# Patient Record
Sex: MALE | Race: WHITE | NOT HISPANIC OR LATINO | Employment: OTHER | URBAN - METROPOLITAN AREA
[De-identification: names, ages, dates, MRNs, and addresses within clinical notes are randomized per-mention and may not be internally consistent; named-entity substitution may affect disease eponyms.]

---

## 2017-01-04 ENCOUNTER — LAB REQUISITION (OUTPATIENT)
Dept: LAB | Facility: HOSPITAL | Age: 82
End: 2017-01-04

## 2017-01-04 DIAGNOSIS — C43.72 MALIGNANT MELANOMA OF LEFT LOWER EXTREMITY INCLUDING HIP (HCC): ICD-10-CM

## 2017-01-04 PROCEDURE — 88305 TISSUE EXAM BY PATHOLOGIST: CPT | Performed by: SURGERY

## 2017-01-04 PROCEDURE — 88341 IMHCHEM/IMCYTCHM EA ADD ANTB: CPT | Performed by: SURGERY

## 2017-01-10 LAB
CYTO UR: NORMAL
LAB AP CASE REPORT: NORMAL
LAB AP CLINICAL INFORMATION: NORMAL
Lab: NORMAL
PATH REPORT.FINAL DX SPEC: NORMAL
PATH REPORT.GROSS SPEC: NORMAL

## 2017-01-31 ENCOUNTER — HOSPITAL ENCOUNTER (OUTPATIENT)
Dept: GENERAL RADIOLOGY | Facility: HOSPITAL | Age: 82
Discharge: HOME | End: 2017-01-31
Admitting: ORTHOPAEDIC SURGERY

## 2017-01-31 ENCOUNTER — APPOINTMENT (OUTPATIENT)
Dept: PREADMISSION TESTING | Facility: HOSPITAL | Age: 82
End: 2017-01-31

## 2017-01-31 VITALS — WEIGHT: 152.56 LBS | BODY MASS INDEX: 24.52 KG/M2 | HEIGHT: 66 IN

## 2017-01-31 DIAGNOSIS — Z01.89 LABORATORY TEST: Primary | ICD-10-CM

## 2017-01-31 LAB
ABO GROUP BLD: NORMAL
ALBUMIN SERPL-MCNC: 4.1 G/DL (ref 3.2–4.8)
ALBUMIN/GLOB SERPL: 1.8 G/DL (ref 1.5–2.5)
ALP SERPL-CCNC: 80 U/L (ref 25–100)
ALT SERPL W P-5'-P-CCNC: 16 U/L (ref 7–40)
ANION GAP SERPL CALCULATED.3IONS-SCNC: 9 MMOL/L (ref 3–11)
AST SERPL-CCNC: 23 U/L (ref 0–33)
BILIRUB SERPL-MCNC: 0.6 MG/DL (ref 0.3–1.2)
BUN BLD-MCNC: 19 MG/DL (ref 9–23)
BUN/CREAT SERPL: 19 (ref 7–25)
CALCIUM SPEC-SCNC: 9.6 MG/DL (ref 8.7–10.4)
CHLORIDE SERPL-SCNC: 100 MMOL/L (ref 99–109)
CO2 SERPL-SCNC: 24 MMOL/L (ref 20–31)
CREAT BLD-MCNC: 1 MG/DL (ref 0.6–1.3)
DEPRECATED RDW RBC AUTO: 43.9 FL (ref 37–54)
ERYTHROCYTE [DISTWIDTH] IN BLOOD BY AUTOMATED COUNT: 12.9 % (ref 11.3–14.5)
GFR SERPL CREATININE-BSD FRML MDRD: 71 ML/MIN/1.73
GLOBULIN UR ELPH-MCNC: 2.3 GM/DL
GLUCOSE BLD-MCNC: 91 MG/DL (ref 70–100)
HBA1C MFR BLD: 6 % (ref 4.8–5.6)
HCT VFR BLD AUTO: 39.5 % (ref 38.9–50.9)
HGB BLD-MCNC: 13.4 G/DL (ref 13.1–17.5)
MCH RBC QN AUTO: 31.5 PG (ref 27–31)
MCHC RBC AUTO-ENTMCNC: 33.9 G/DL (ref 32–36)
MCV RBC AUTO: 92.9 FL (ref 80–99)
PLATELET # BLD AUTO: 276 10*3/MM3 (ref 150–450)
PMV BLD AUTO: 9.6 FL (ref 6–12)
POTASSIUM BLD-SCNC: 4.6 MMOL/L (ref 3.5–5.5)
PROT SERPL-MCNC: 6.4 G/DL (ref 5.7–8.2)
RBC # BLD AUTO: 4.25 10*6/MM3 (ref 4.2–5.76)
RH BLD: POSITIVE
SODIUM BLD-SCNC: 133 MMOL/L (ref 132–146)
WBC NRBC COR # BLD: 8.91 10*3/MM3 (ref 3.5–10.8)

## 2017-01-31 PROCEDURE — 85027 COMPLETE CBC AUTOMATED: CPT | Performed by: ORTHOPAEDIC SURGERY

## 2017-01-31 PROCEDURE — 86901 BLOOD TYPING SEROLOGIC RH(D): CPT

## 2017-01-31 PROCEDURE — 93010 ELECTROCARDIOGRAM REPORT: CPT | Performed by: INTERNAL MEDICINE

## 2017-01-31 PROCEDURE — 36415 COLL VENOUS BLD VENIPUNCTURE: CPT

## 2017-01-31 PROCEDURE — 93005 ELECTROCARDIOGRAM TRACING: CPT

## 2017-01-31 PROCEDURE — 71020 HC CHEST PA AND LATERAL: CPT

## 2017-01-31 PROCEDURE — 83036 HEMOGLOBIN GLYCOSYLATED A1C: CPT | Performed by: ORTHOPAEDIC SURGERY

## 2017-01-31 PROCEDURE — 80053 COMPREHEN METABOLIC PANEL: CPT | Performed by: ORTHOPAEDIC SURGERY

## 2017-01-31 PROCEDURE — 86900 BLOOD TYPING SEROLOGIC ABO: CPT

## 2017-02-06 ENCOUNTER — HOSPITAL ENCOUNTER (INPATIENT)
Facility: HOSPITAL | Age: 82
LOS: 2 days | Discharge: HOME HEALTH | End: 2017-02-08
Attending: ORTHOPAEDIC SURGERY | Admitting: ORTHOPAEDIC SURGERY

## 2017-02-06 ENCOUNTER — APPOINTMENT (OUTPATIENT)
Dept: GENERAL RADIOLOGY | Facility: HOSPITAL | Age: 82
End: 2017-02-06

## 2017-02-06 ENCOUNTER — ANESTHESIA EVENT (OUTPATIENT)
Dept: PERIOP | Facility: HOSPITAL | Age: 82
End: 2017-02-06

## 2017-02-06 ENCOUNTER — ANESTHESIA (OUTPATIENT)
Dept: PERIOP | Facility: HOSPITAL | Age: 82
End: 2017-02-06

## 2017-02-06 DIAGNOSIS — Z74.09 IMPAIRED MOBILITY AND ADLS: ICD-10-CM

## 2017-02-06 DIAGNOSIS — Z74.09 IMPAIRED FUNCTIONAL MOBILITY, BALANCE, GAIT, AND ENDURANCE: Primary | ICD-10-CM

## 2017-02-06 DIAGNOSIS — Z78.9 IMPAIRED MOBILITY AND ADLS: ICD-10-CM

## 2017-02-06 DIAGNOSIS — Z00.00 HEALTHCARE MAINTENANCE: ICD-10-CM

## 2017-02-06 PROBLEM — M81.0 OSTEOPOROSIS: Status: ACTIVE | Noted: 2017-02-06

## 2017-02-06 PROBLEM — Z96.652 S/P LEFT UNICOMPARTMENTAL KNEE REPLACEMENT: Status: ACTIVE | Noted: 2017-02-06

## 2017-02-06 PROBLEM — M17.12 ARTHRITIS OF KNEE, LEFT: Status: ACTIVE | Noted: 2017-02-06

## 2017-02-06 PROBLEM — I48.91 A-FIB (HCC): Status: ACTIVE | Noted: 2017-02-06

## 2017-02-06 PROBLEM — I10 HTN (HYPERTENSION): Status: ACTIVE | Noted: 2017-02-06

## 2017-02-06 LAB
ABO GROUP BLD: NORMAL
BLD GP AB SCN SERPL QL: NEGATIVE
POTASSIUM BLDA-SCNC: 4.02 MMOL/L (ref 3.5–5.3)
RH BLD: POSITIVE

## 2017-02-06 PROCEDURE — 25010000002 PROPOFOL 10 MG/ML EMULSION: Performed by: NURSE ANESTHETIST, CERTIFIED REGISTERED

## 2017-02-06 PROCEDURE — 0SRD0J9 REPLACEMENT OF LEFT KNEE JOINT WITH SYNTHETIC SUBSTITUTE, CEMENTED, OPEN APPROACH: ICD-10-PCS | Performed by: ORTHOPAEDIC SURGERY

## 2017-02-06 PROCEDURE — 86901 BLOOD TYPING SEROLOGIC RH(D): CPT

## 2017-02-06 PROCEDURE — 84132 ASSAY OF SERUM POTASSIUM: CPT | Performed by: ORTHOPAEDIC SURGERY

## 2017-02-06 PROCEDURE — C1776 JOINT DEVICE (IMPLANTABLE): HCPCS | Performed by: ORTHOPAEDIC SURGERY

## 2017-02-06 PROCEDURE — 25010000002 ROPIVACAINE PER 1 MG: Performed by: NURSE ANESTHETIST, CERTIFIED REGISTERED

## 2017-02-06 PROCEDURE — C1713 ANCHOR/SCREW BN/BN,TIS/BN: HCPCS | Performed by: ORTHOPAEDIC SURGERY

## 2017-02-06 PROCEDURE — 25010000003 CEFAZOLIN IN DEXTROSE 2-4 GM/100ML-% SOLUTION: Performed by: ORTHOPAEDIC SURGERY

## 2017-02-06 PROCEDURE — 86900 BLOOD TYPING SEROLOGIC ABO: CPT

## 2017-02-06 PROCEDURE — 94799 UNLISTED PULMONARY SVC/PX: CPT

## 2017-02-06 PROCEDURE — 86850 RBC ANTIBODY SCREEN: CPT

## 2017-02-06 PROCEDURE — 73560 X-RAY EXAM OF KNEE 1 OR 2: CPT

## 2017-02-06 DEVICE — PRT KN UNIPOL DEPUY 9527034: Type: IMPLANTABLE DEVICE | Site: KNEE | Status: FUNCTIONAL

## 2017-02-06 DEVICE — SIGMA HIGH PERFORMANCE PARTIAL KNEE TIBIAL INSERT UNICONDYLAR ALL POLYETHYLENE SIZE 3 8MM LM/RL XLK
Type: IMPLANTABLE DEVICE | Site: KNEE | Status: FUNCTIONAL
Brand: SIGMA

## 2017-02-06 DEVICE — SIGMA HIGH PERFORMANCE PARTIAL KNEE FEMORAL UNICONDYLAR CEMENTED SIZE 4 LM/RL
Type: IMPLANTABLE DEVICE | Site: KNEE | Status: FUNCTIONAL
Brand: SIGMA

## 2017-02-06 DEVICE — SMARTSET HIGH PERFORMANCE MV MEDIUM VISCOSITY BONE CEMENT 40G
Type: IMPLANTABLE DEVICE | Site: KNEE | Status: FUNCTIONAL
Brand: SMARTSET

## 2017-02-06 RX ORDER — PREGABALIN 75 MG/1
75 CAPSULE ORAL ONCE
Status: COMPLETED | OUTPATIENT
Start: 2017-02-06 | End: 2017-02-06

## 2017-02-06 RX ORDER — HYDRALAZINE HYDROCHLORIDE 20 MG/ML
10 INJECTION INTRAMUSCULAR; INTRAVENOUS EVERY 6 HOURS PRN
Status: DISCONTINUED | OUTPATIENT
Start: 2017-02-06 | End: 2017-02-08 | Stop reason: HOSPADM

## 2017-02-06 RX ORDER — SODIUM CHLORIDE, SODIUM LACTATE, POTASSIUM CHLORIDE, CALCIUM CHLORIDE 600; 310; 30; 20 MG/100ML; MG/100ML; MG/100ML; MG/100ML
9 INJECTION, SOLUTION INTRAVENOUS CONTINUOUS
Status: DISCONTINUED | OUTPATIENT
Start: 2017-02-06 | End: 2017-02-08 | Stop reason: HOSPADM

## 2017-02-06 RX ORDER — ACETAMINOPHEN 160 MG/5ML
650 SOLUTION ORAL EVERY 4 HOURS PRN
Status: DISCONTINUED | OUTPATIENT
Start: 2017-02-06 | End: 2017-02-08 | Stop reason: HOSPADM

## 2017-02-06 RX ORDER — PROMETHAZINE HYDROCHLORIDE 25 MG/ML
12.5 INJECTION, SOLUTION INTRAMUSCULAR; INTRAVENOUS EVERY 6 HOURS PRN
Status: DISCONTINUED | OUTPATIENT
Start: 2017-02-06 | End: 2017-02-08 | Stop reason: HOSPADM

## 2017-02-06 RX ORDER — CEFAZOLIN SODIUM 2 G/100ML
2 INJECTION, SOLUTION INTRAVENOUS EVERY 8 HOURS
Status: COMPLETED | OUTPATIENT
Start: 2017-02-07 | End: 2017-02-07

## 2017-02-06 RX ORDER — ACETAMINOPHEN 325 MG/1
650 TABLET ORAL EVERY 4 HOURS PRN
Status: DISCONTINUED | OUTPATIENT
Start: 2017-02-06 | End: 2017-02-08 | Stop reason: HOSPADM

## 2017-02-06 RX ORDER — ONDANSETRON 2 MG/ML
4 INJECTION INTRAMUSCULAR; INTRAVENOUS EVERY 6 HOURS PRN
Status: DISCONTINUED | OUTPATIENT
Start: 2017-02-06 | End: 2017-02-08 | Stop reason: HOSPADM

## 2017-02-06 RX ORDER — CEFAZOLIN SODIUM 2 G/100ML
2 INJECTION, SOLUTION INTRAVENOUS ONCE
Status: COMPLETED | OUTPATIENT
Start: 2017-02-07 | End: 2017-02-06

## 2017-02-06 RX ORDER — FAMOTIDINE 10 MG/ML
20 INJECTION, SOLUTION INTRAVENOUS ONCE
Status: DISCONTINUED | OUTPATIENT
Start: 2017-02-06 | End: 2017-02-06

## 2017-02-06 RX ORDER — ONDANSETRON 2 MG/ML
4 INJECTION INTRAMUSCULAR; INTRAVENOUS ONCE AS NEEDED
Status: DISCONTINUED | OUTPATIENT
Start: 2017-02-06 | End: 2017-02-06 | Stop reason: HOSPADM

## 2017-02-06 RX ORDER — SENNA AND DOCUSATE SODIUM 50; 8.6 MG/1; MG/1
2 TABLET, FILM COATED ORAL 2 TIMES DAILY
Status: DISCONTINUED | OUTPATIENT
Start: 2017-02-06 | End: 2017-02-08 | Stop reason: HOSPADM

## 2017-02-06 RX ORDER — HYDROCODONE BITARTRATE AND ACETAMINOPHEN 5; 325 MG/1; MG/1
1 TABLET ORAL EVERY 4 HOURS PRN
Status: DISCONTINUED | OUTPATIENT
Start: 2017-02-06 | End: 2017-02-08 | Stop reason: HOSPADM

## 2017-02-06 RX ORDER — SODIUM CHLORIDE 9 MG/ML
150 INJECTION, SOLUTION INTRAVENOUS CONTINUOUS
Status: DISCONTINUED | OUTPATIENT
Start: 2017-02-06 | End: 2017-02-08 | Stop reason: HOSPADM

## 2017-02-06 RX ORDER — FAMOTIDINE 20 MG/1
20 TABLET, FILM COATED ORAL ONCE
Status: COMPLETED | OUTPATIENT
Start: 2017-02-06 | End: 2017-02-06

## 2017-02-06 RX ORDER — SODIUM CHLORIDE 0.9 % (FLUSH) 0.9 %
1-10 SYRINGE (ML) INJECTION AS NEEDED
Status: DISCONTINUED | OUTPATIENT
Start: 2017-02-06 | End: 2017-02-08 | Stop reason: HOSPADM

## 2017-02-06 RX ORDER — SODIUM CHLORIDE 0.9 % (FLUSH) 0.9 %
1-10 SYRINGE (ML) INJECTION AS NEEDED
Status: DISCONTINUED | OUTPATIENT
Start: 2017-02-06 | End: 2017-02-06 | Stop reason: HOSPADM

## 2017-02-06 RX ORDER — AMLODIPINE BESYLATE 5 MG/1
5 TABLET ORAL NIGHTLY
Status: DISCONTINUED | OUTPATIENT
Start: 2017-02-06 | End: 2017-02-08 | Stop reason: HOSPADM

## 2017-02-06 RX ORDER — LIDOCAINE HYDROCHLORIDE 10 MG/ML
1 INJECTION, SOLUTION EPIDURAL; INFILTRATION; INTRACAUDAL; PERINEURAL ONCE
Status: COMPLETED | OUTPATIENT
Start: 2017-02-06 | End: 2017-02-06

## 2017-02-06 RX ORDER — PROPOFOL 10 MG/ML
VIAL (ML) INTRAVENOUS CONTINUOUS PRN
Status: DISCONTINUED | OUTPATIENT
Start: 2017-02-06 | End: 2017-02-06 | Stop reason: SURG

## 2017-02-06 RX ORDER — ROPIVACAINE HYDROCHLORIDE 2 MG/ML
12 INJECTION, SOLUTION EPIDURAL; INFILTRATION CONTINUOUS
Status: DISCONTINUED | OUTPATIENT
Start: 2017-02-06 | End: 2017-02-08

## 2017-02-06 RX ORDER — ASPIRIN 325 MG
325 TABLET, DELAYED RELEASE (ENTERIC COATED) ORAL DAILY
Status: DISCONTINUED | OUTPATIENT
Start: 2017-02-07 | End: 2017-02-08 | Stop reason: HOSPADM

## 2017-02-06 RX ORDER — BUPIVACAINE HYDROCHLORIDE 5 MG/ML
INJECTION, SOLUTION EPIDURAL; INTRACAUDAL AS NEEDED
Status: DISCONTINUED | OUTPATIENT
Start: 2017-02-06 | End: 2017-02-06 | Stop reason: SURG

## 2017-02-06 RX ORDER — TRAMADOL HYDROCHLORIDE 50 MG/1
50 TABLET ORAL EVERY 6 HOURS PRN
Status: DISCONTINUED | OUTPATIENT
Start: 2017-02-06 | End: 2017-02-08 | Stop reason: HOSPADM

## 2017-02-06 RX ORDER — FENTANYL CITRATE 50 UG/ML
50 INJECTION, SOLUTION INTRAMUSCULAR; INTRAVENOUS
Status: DISCONTINUED | OUTPATIENT
Start: 2017-02-06 | End: 2017-02-06 | Stop reason: HOSPADM

## 2017-02-06 RX ORDER — BISACODYL 10 MG
10 SUPPOSITORY, RECTAL RECTAL DAILY PRN
Status: DISCONTINUED | OUTPATIENT
Start: 2017-02-06 | End: 2017-02-08 | Stop reason: HOSPADM

## 2017-02-06 RX ORDER — ACETAMINOPHEN 650 MG
TABLET, EXTENDED RELEASE ORAL AS NEEDED
Status: DISCONTINUED | OUTPATIENT
Start: 2017-02-06 | End: 2017-02-06 | Stop reason: HOSPADM

## 2017-02-06 RX ORDER — CELECOXIB 200 MG/1
200 CAPSULE ORAL ONCE
Status: COMPLETED | OUTPATIENT
Start: 2017-02-07 | End: 2017-02-06

## 2017-02-06 RX ORDER — MAGNESIUM HYDROXIDE 1200 MG/15ML
LIQUID ORAL AS NEEDED
Status: DISCONTINUED | OUTPATIENT
Start: 2017-02-06 | End: 2017-02-06 | Stop reason: HOSPADM

## 2017-02-06 RX ORDER — ACETAMINOPHEN 325 MG/1
650 TABLET ORAL ONCE
Status: COMPLETED | OUTPATIENT
Start: 2017-02-07 | End: 2017-02-06

## 2017-02-06 RX ORDER — DOCUSATE SODIUM 100 MG/1
100 CAPSULE, LIQUID FILLED ORAL 2 TIMES DAILY PRN
Status: DISCONTINUED | OUTPATIENT
Start: 2017-02-06 | End: 2017-02-08 | Stop reason: HOSPADM

## 2017-02-06 RX ORDER — BENAZEPRIL HYDROCHLORIDE 5 MG/1
20 TABLET, FILM COATED ORAL
Status: DISCONTINUED | OUTPATIENT
Start: 2017-02-06 | End: 2017-02-08 | Stop reason: HOSPADM

## 2017-02-06 RX ADMIN — ROPIVACAINE HYDROCHLORIDE 12 ML/HR: 2 INJECTION, SOLUTION EPIDURAL; INFILTRATION at 18:15

## 2017-02-06 RX ADMIN — TRAMADOL HYDROCHLORIDE 50 MG: 50 TABLET, COATED ORAL at 23:26

## 2017-02-06 RX ADMIN — SODIUM CHLORIDE 150 ML/HR: 9 INJECTION, SOLUTION INTRAVENOUS at 20:08

## 2017-02-06 RX ADMIN — BUPIVACAINE HYDROCHLORIDE 10 MG: 5 INJECTION, SOLUTION EPIDURAL; INTRACAUDAL; PERINEURAL at 16:22

## 2017-02-06 RX ADMIN — PROPOFOL 44 MCG/KG/MIN: 10 INJECTION, EMULSION INTRAVENOUS at 16:20

## 2017-02-06 RX ADMIN — CEFAZOLIN SODIUM 2 G: 2 INJECTION, SOLUTION INTRAVENOUS at 23:23

## 2017-02-06 RX ADMIN — CELECOXIB 200 MG: 200 CAPSULE ORAL at 14:45

## 2017-02-06 RX ADMIN — TRANEXAMIC ACID 700 MG: 100 INJECTION, SOLUTION INTRAVENOUS at 17:25

## 2017-02-06 RX ADMIN — BUPIVACAINE HYDROCHLORIDE 50 MG: 5 INJECTION, SOLUTION EPIDURAL; INTRACAUDAL; PERINEURAL at 17:50

## 2017-02-06 RX ADMIN — TRANEXAMIC ACID 700 MG: 100 INJECTION, SOLUTION INTRAVENOUS at 16:30

## 2017-02-06 RX ADMIN — FAMOTIDINE 20 MG: 20 TABLET, FILM COATED ORAL at 14:24

## 2017-02-06 RX ADMIN — MUPIROCIN: 20 OINTMENT TOPICAL at 14:04

## 2017-02-06 RX ADMIN — AMLODIPINE BESYLATE 5 MG: 5 TABLET ORAL at 20:09

## 2017-02-06 RX ADMIN — CEFAZOLIN SODIUM 2 G: 2 INJECTION, SOLUTION INTRAVENOUS at 16:18

## 2017-02-06 RX ADMIN — SODIUM CHLORIDE, POTASSIUM CHLORIDE, SODIUM LACTATE AND CALCIUM CHLORIDE: 600; 310; 30; 20 INJECTION, SOLUTION INTRAVENOUS at 17:05

## 2017-02-06 RX ADMIN — ACETAMINOPHEN 650 MG: 325 TABLET, FILM COATED ORAL at 14:04

## 2017-02-06 RX ADMIN — SODIUM CHLORIDE, POTASSIUM CHLORIDE, SODIUM LACTATE AND CALCIUM CHLORIDE 9 ML/HR: 600; 310; 30; 20 INJECTION, SOLUTION INTRAVENOUS at 14:24

## 2017-02-06 RX ADMIN — LIDOCAINE HYDROCHLORIDE 1 ML: 10 INJECTION, SOLUTION EPIDURAL; INFILTRATION; INTRACAUDAL; PERINEURAL at 14:24

## 2017-02-06 RX ADMIN — PREGABALIN 75 MG: 75 CAPSULE ORAL at 14:03

## 2017-02-06 RX ADMIN — HYDROCODONE BITARTRATE AND ACETAMINOPHEN 1 TABLET: 5; 325 TABLET ORAL at 21:59

## 2017-02-06 RX ADMIN — SODIUM CHLORIDE, POTASSIUM CHLORIDE, SODIUM LACTATE AND CALCIUM CHLORIDE: 600; 310; 30; 20 INJECTION, SOLUTION INTRAVENOUS at 16:15

## 2017-02-06 NOTE — INTERVAL H&P NOTE
Pre-Op H&P (See Recent Office Note Attached)    Review of Systems:  General ROS:  no fever, chills, rashes, No change since last office visit  Cardiovascular ROS: no chest pain or dyspnea on exertion.  + cardiac clearance  Respiratory ROS: no cough, shortness of breath, or wheezing    Immunization History:   Influenza:  Yes 2016  Pneumococcal:  no  Tetanus:  no    Vital Signs:  T97.7 HR 73 O2 95% /75    Physical Exam:    CV:  S1S2 regular rate and rhythm, no murmur               Resp:  Clear to auscultation; respirations regular, even and unlabored    Results Review:    I reviewed the patient's new clinical results.  EKG SR with PAC's in the 60's    Ashanti Jackson, PITO  2/6/2017 2:15 PM

## 2017-02-06 NOTE — ANESTHESIA PREPROCEDURE EVALUATION
Anesthesia Evaluation     Patient summary reviewed and Nursing notes reviewed    Airway   Mallampati: I  TM distance: >3 FB  Neck ROM: full  no difficulty expected  Dental      Pulmonary - negative pulmonary ROS   Cardiovascular   (+) hypertension, dysrhythmias,     ECG reviewed    Neuro/Psych- negative ROS  GI/Hepatic/Renal/Endo - negative ROS     Musculoskeletal     Abdominal    Substance History - negative use     OB/GYN negative ob/gyn ROS         Other   (+) arthritis                          Anesthesia Plan    ASA 2     spinal   (ACB)  intravenous induction   Anesthetic plan and risks discussed with patient.    Plan discussed with CRNA.

## 2017-02-06 NOTE — BRIEF OP NOTE
KNEE ARTHROPLASTY UNICOMPARTMENTAL  Procedure Note    Sandip Feliz  2/6/2017    Pre-op Diagnosis:   Left knee osteoarthritis    Post-op Diagnosis:     Left knee osteoarthritis    Procedure/CPT® Codes:  47274    Procedure(s):  UNICOMPARTMENTAL ARTHROPLASTY LEFT knee    Surgeon(s):  Richard Bennett MD    Anesthesia: General    Staff:   Circulator: Todd Leo RN; Sherri L Haase, RN  Scrub Person: Martinez Valdes; Xu Todd  Vendor Representative: Esteban Turcios  Assistant: Samantha Arriaga PA-C    Estimated Blood Loss: * No values recorded between 2/6/2017 12:00 AM and 2/6/2017  5:38 PM *    Specimens:                * No specimens in log *      Drains:           Findings: isolated medial compartment osteoarthritis    Complications: none      Richard Bennett MD     Date: 2/6/2017  Time: 5:38 PM

## 2017-02-06 NOTE — ANESTHESIA PROCEDURE NOTES
Spinal Block    Patient location during procedure: OR  Start Time: 2/6/2017 4:18 PM  Stop Time: 2/6/2017 4:22 PM  Indication:at surgeon's request  Performed By  CRNA: LIBORIO THAPA  Preanesthetic Checklist  Completed: patient identified, site marked, surgical consent, pre-op evaluation, timeout performed, IV checked, risks and benefits discussed and monitors and equipment checked  Spinal Block Prep:  Patient Position:sitting  Sterile Tech:cap, gloves, sterile barriers and mask  Prep:Chloraprep  Patient Monitoring:blood pressure monitoring, continuous pulse oximetry and EKG  Spinal Block Procedure  Approach:midline  Guidance:landmark technique and palpation technique  Location:L4-L5  Needle Type:Maira (Introducer:   Yes /No)  Needle Gauge:25 G  Placement of Spinal needle event:cerebrospinal fluid  Fluid Appearance:clear  Post Assessment  Patient Tolerance:patient tolerated the procedure well with no apparent complications  Complications no  Additional Notes  Procedure:  Pt assisted to sitting position, with legs in position of comfort over side of bed.  Pt. instructed in optimal spine presentation, the spine was prepped/ Draped and the skin at insertion site was anesthetized with 1% Lidocaine 2 ml.  The spinal needle was then advanced until CSF flow was obtained and LA was injected:      Marcaine 0.5% PSF injected 10 Mg.

## 2017-02-06 NOTE — ANESTHESIA POSTPROCEDURE EVALUATION
Patient: Sandip Feliz    Procedure Summary     Date Anesthesia Start Anesthesia Stop Room / Location    02/06/17 1070 [id number] [facility] / [facility]       Procedure Diagnosis Surgeon Provider    UNICOMPARTMENTAL ARTHROPLASTY LEFT knee (Left Knee) No diagnosis on file. MD Huang Acosta MD          Anesthesia Type: spinal  Last vitals  /64 (02/06/17 1746)    Temp 98.2 °F (36.8 °C) (02/06/17 1746)    Pulse 66 (02/06/17 1746)   Resp 16 (02/06/17 1746)    SpO2 98 % (02/06/17 1746)      Post Anesthesia Care and Evaluation    Patient location during evaluation: PACU  Patient participation: complete - patient participated  Level of consciousness: awake and alert  Pain score: 0  Pain management: adequate  Airway patency: patent  Anesthetic complications: No anesthetic complications  PONV Status: none  Cardiovascular status: hemodynamically stable and acceptable  Respiratory status: nonlabored ventilation, acceptable and nasal cannula  Hydration status: acceptable

## 2017-02-06 NOTE — ANESTHESIA PROCEDURE NOTES
Peripheral Block    Patient location during procedure: post-op  Start time: 2/6/2017 5:48 PM  Stop time: 2/6/2017 5:55 PM  Reason for block: at surgeon's request and post-op pain management  Performed by  Anesthesiologist: STEFAN BARRAGAN  Preanesthetic Checklist  Completed: patient identified, site marked, surgical consent, pre-op evaluation, timeout performed, IV checked, risks and benefits discussed and monitors and equipment checked  Peripheral Block Prep:  Sterile barriers:cap, gloves, mask and sterile barriers  Prep: ChloraPrep  Patient monitoring: blood pressure monitoring, continuous pulse oximetry and EKG  Peripheral Procedure  Guidance:ultrasound guided  Images:still images obtained  Laterality:leftBlock Type:adductor canal block  Injection Technique:catheterNeedle Type:Tuohy  Needle Gauge:18 G  Catheter Size:20 G (20g)  Medications  Local Injected:bupivacaine 0.25% without epinephrine Local Amount Injected:20 (ml)mL  Post Assessment  Patient Tolerance:comfortable throughout block  Complications:no  Additional Notes  Procedure:           CATHETER at skin: 6                                 Analgesia was achieved with 1% Lidocaine 2 ml for infiltration of skin       The pt was placed in the Supine position.  The Insertion site was  prepped and Draped in sterile fashion.  A BBraun 4 inch 18g echogenic needle was then  inserted approximately midline, mid-thigh and advanced In-plane with Ultrasound guidance.  Normal Saline PSF was utilized for hydrodissection of tissue.  The Vastus medialis and Sartorius muscle where visualized and the needle tip was placed in the adductor canal,  lateral to the femoral artery.  LA injection spread was visualized, injection was incremental 1-5ml, injection pressure was normal or little, no intraneural injection, no vascular injection.  LA dose was injected thru the needle(see dose above).  A BBraun 20g wire stylet catheter was placed via the needle with ultrasound  visualization and confirmation with NS fluid bolus. The labeled Catheter was then secured to skin at insertion site with skin afix and steristrips to curled catheter and CHG transparent dressing.  Thank you.

## 2017-02-06 NOTE — H&P
Patient Name: Sandip Feliz  MRN: [id number]  : 1931  DOS: 2017    Attending: Satinder Avalos MD    Primary Care Provider: Kelly Robison MD      Chief complaint:  Left knee pain    Subjective   Patient is a 85 y.o. male presented for scheduled surgery with Dr. Bennett. He plans left knee surgery. His knee has been painful for over a year. He has tried injections with minimal pain relief.     When seen in pre-op he is doing well. He denies pain, nausea, shortness of breath or chest pain. No hx of DVT or PE.    He does have a history of afib and is followed biannually by Dr. Hutchins.    Seen in his room after surgery. Tolerated procedure well, no c/o pain , fever, chills, sob, nausea. His LEs are still with effects of spinal anesthesia. wy      Allergies:  No Known Allergies    Meds:  Prescriptions Prior to Admission   Medication Sig Dispense Refill Last Dose   • amLODIPine-benazepril (LOTREL 5-20) 5-20 MG per capsule Take 1 capsule by mouth Daily.   2017 at 0800   • triamterene-hydrochlorothiazide (MAXZIDE-25) 37.5-25 MG per tablet Take 1 tablet by mouth Daily.   2017 at 0800   • denosumab (PROLIA) 60 MG/ML solution syringe Inject 60 mg under the skin every 6 (six) months.   More than a month   • XARELTO 20 MG tablet Take 0.5 tablets by mouth daily with dinner. 30 tablet 3 2017       History:   Past Medical History   Diagnosis Date   • A-fib, Paroxysmal    • Arthritis    • Hearing impairment    • Hypertension    • Melanoma    • Wears dentures    • Wears glasses    • Wears hearing aid      Past Surgical History   Procedure Laterality Date   • Stomach surgery     • Appendectomy     • Cataract extraction     • Skin cancer excision       MELANOMA     History reviewed. No pertinent family history.  Social History   Substance Use Topics   • Smoking status: Former Smoker     Types: Pipe     Quit date:    • Smokeless tobacco: Never Used   • Alcohol use 1.2 - 2.4 oz/week     1 - 2 Glasses  of wine, 1 - 2 Cans of beer per week      Comment: NIGHTLY   , lives with wife.\    Review of Systems  Pertinent items are noted in HPI, all other systems reviewed and negative    Vital Signs  Visit Vitals   • /69 (Patient Position: Lying)   • Pulse (!) 48   • Temp 98 °F (36.7 °C) (Temporal Artery )   • Resp 16   • SpO2 98%       Physical Exam:    General Appearance:    Alert, cooperative, in no acute distress. [place]   Head:    Normocephalic, without obvious abnormality, atraumatic   Eyes:            Lids and lashes normal, conjunctivae and sclerae normal, no   icterus, no pallor, corneas clear    Ears:    Ears appear intact with no abnormalities noted   Throat:   No oral lesions, no thrush, oral mucosa moist   Neck:   No adenopathy, supple, trachea midline, no thyromegaly    Lungs:     Clear to auscultation,respirations regular, even and                   unlabored    Heart:    Regular rhythm and normal rate, normal S1 and S2   Abdomen:     Normal bowel sounds, no masses, no organomegaly, soft        non-tender, non-distended, no guarding, no rebound                 tenderness   Genitalia:    Deferred   Extremities:   Moves all extremities well, no edema, no cyanosis, no              Redness. Left knee with CDI ACE. Left leg with ACB cath.   Pulses:   Pulses palpable and equal bilaterally   Skin:   No bleeding, bruising or rash   Neurologic:   Cranial nerves 2 - 12 grossly intact, sensation intact      I reviewed the patient's new clinical results.         Results from last 7 days  Lab Units 01/31/17  1111   WBC 10*3/mm3 8.91   HEMOGLOBIN g/dL 13.4   HEMATOCRIT % 39.5   PLATELETS 10*3/mm3 276       Results from last 7 days  Lab Units 01/31/17  1111   SODIUM mmol/L 133   POTASSIUM mmol/L 4.6   CHLORIDE mmol/L 100   TOTAL CO2 mmol/L 24.0   BUN mg/dL 19   CREATININE mg/dL 1.00   CALCIUM mg/dL 9.6   BILIRUBIN mg/dL 0.6   ALK PHOS U/L 80   ALT (SGPT) U/L 16   AST (SGOT) U/L 23   GLUCOSE mg/dL 91     Lab  Results   Component Value Date    HGBA1C 6.00 (H) 01/31/2017       Assessment and Plan:   Principal Problem:    S/P left unicompartmental knee replacement  Active Problems:    A-fib, paroxysmal.    HTN (hypertension)    Osteoporosis    Arthritis of knee, left    Hearing deficit    Hgb A1C elevation, prediabetes range.    Plan  1. PT/OT- early ambulation post op  2. Pain control-prns   3. IS-encourage  4. DVT proph- Mechs/ASA . Restart xarelto when ok with Dr. Bennett  5. Bowel regimen  6. Resume home medications as appropriate  7. Monitor post-op labs. Blood glucose.   8. DC planning for home      Satinder Avalos MD  02/06/17  7:21 PM

## 2017-02-07 PROBLEM — I48.91 A-FIB (HCC): Status: RESOLVED | Noted: 2017-02-06 | Resolved: 2017-02-07

## 2017-02-07 PROBLEM — D62 ACUTE BLOOD LOSS ANEMIA: Status: ACTIVE | Noted: 2017-02-07

## 2017-02-07 PROBLEM — I48.0 PAROXYSMAL ATRIAL FIBRILLATION (HCC): Status: ACTIVE | Noted: 2017-02-07

## 2017-02-07 LAB
ANION GAP SERPL CALCULATED.3IONS-SCNC: 6 MMOL/L (ref 3–11)
BASOPHILS # BLD AUTO: 0.04 10*3/MM3 (ref 0–0.2)
BASOPHILS NFR BLD AUTO: 0.4 % (ref 0–1)
BUN BLD-MCNC: 15 MG/DL (ref 9–23)
BUN/CREAT SERPL: 18.8 (ref 7–25)
CALCIUM SPEC-SCNC: 9 MG/DL (ref 8.7–10.4)
CHLORIDE SERPL-SCNC: 105 MMOL/L (ref 99–109)
CO2 SERPL-SCNC: 24 MMOL/L (ref 20–31)
CREAT BLD-MCNC: 0.8 MG/DL (ref 0.6–1.3)
DEPRECATED RDW RBC AUTO: 43.5 FL (ref 37–54)
EOSINOPHIL # BLD AUTO: 0.31 10*3/MM3 (ref 0.1–0.3)
EOSINOPHIL NFR BLD AUTO: 3.1 % (ref 0–3)
ERYTHROCYTE [DISTWIDTH] IN BLOOD BY AUTOMATED COUNT: 12.6 % (ref 11.3–14.5)
GFR SERPL CREATININE-BSD FRML MDRD: 92 ML/MIN/1.73
GLUCOSE BLD-MCNC: 130 MG/DL (ref 70–100)
GLUCOSE BLDC GLUCOMTR-MCNC: 137 MG/DL (ref 70–130)
GLUCOSE BLDC GLUCOMTR-MCNC: 156 MG/DL (ref 70–130)
HCT VFR BLD AUTO: 34.1 % (ref 38.9–50.9)
HGB BLD-MCNC: 11.6 G/DL (ref 13.1–17.5)
IMM GRANULOCYTES # BLD: 0.02 10*3/MM3 (ref 0–0.03)
IMM GRANULOCYTES NFR BLD: 0.2 % (ref 0–0.6)
LYMPHOCYTES # BLD AUTO: 0.94 10*3/MM3 (ref 0.6–4.8)
LYMPHOCYTES NFR BLD AUTO: 9.4 % (ref 24–44)
MCH RBC QN AUTO: 31.8 PG (ref 27–31)
MCHC RBC AUTO-ENTMCNC: 34 G/DL (ref 32–36)
MCV RBC AUTO: 93.4 FL (ref 80–99)
MONOCYTES # BLD AUTO: 1.15 10*3/MM3 (ref 0–1)
MONOCYTES NFR BLD AUTO: 11.5 % (ref 0–12)
NEUTROPHILS # BLD AUTO: 7.51 10*3/MM3 (ref 1.5–8.3)
NEUTROPHILS NFR BLD AUTO: 75.4 % (ref 41–71)
PLATELET # BLD AUTO: 244 10*3/MM3 (ref 150–450)
PMV BLD AUTO: 9.7 FL (ref 6–12)
POTASSIUM BLD-SCNC: 4 MMOL/L (ref 3.5–5.5)
RBC # BLD AUTO: 3.65 10*6/MM3 (ref 4.2–5.76)
SODIUM BLD-SCNC: 135 MMOL/L (ref 132–146)
WBC NRBC COR # BLD: 9.97 10*3/MM3 (ref 3.5–10.8)

## 2017-02-07 PROCEDURE — 25010000003 CEFAZOLIN IN DEXTROSE 2-4 GM/100ML-% SOLUTION: Performed by: ORTHOPAEDIC SURGERY

## 2017-02-07 PROCEDURE — 85025 COMPLETE CBC W/AUTO DIFF WBC: CPT | Performed by: ORTHOPAEDIC SURGERY

## 2017-02-07 PROCEDURE — 97162 PT EVAL MOD COMPLEX 30 MIN: CPT

## 2017-02-07 PROCEDURE — 94799 UNLISTED PULMONARY SVC/PX: CPT

## 2017-02-07 PROCEDURE — 82962 GLUCOSE BLOOD TEST: CPT

## 2017-02-07 PROCEDURE — 97530 THERAPEUTIC ACTIVITIES: CPT

## 2017-02-07 PROCEDURE — 97166 OT EVAL MOD COMPLEX 45 MIN: CPT

## 2017-02-07 PROCEDURE — 97110 THERAPEUTIC EXERCISES: CPT

## 2017-02-07 PROCEDURE — 25010000002 ONDANSETRON PER 1 MG: Performed by: NURSE PRACTITIONER

## 2017-02-07 PROCEDURE — 63710000001 INSULIN LISPRO (HUMAN) PER 5 UNITS: Performed by: INTERNAL MEDICINE

## 2017-02-07 PROCEDURE — 25010000002 HYDROMORPHONE PER 4 MG: Performed by: ORTHOPAEDIC SURGERY

## 2017-02-07 PROCEDURE — 80048 BASIC METABOLIC PNL TOTAL CA: CPT | Performed by: NURSE PRACTITIONER

## 2017-02-07 PROCEDURE — 25010000002 ROPIVACAINE PER 1 MG: Performed by: NURSE ANESTHETIST, CERTIFIED REGISTERED

## 2017-02-07 PROCEDURE — 97116 GAIT TRAINING THERAPY: CPT

## 2017-02-07 RX ORDER — NICOTINE POLACRILEX 4 MG
15 LOZENGE BUCCAL
Status: DISCONTINUED | OUTPATIENT
Start: 2017-02-07 | End: 2017-02-08 | Stop reason: HOSPADM

## 2017-02-07 RX ORDER — DEXTROSE MONOHYDRATE 25 G/50ML
25 INJECTION, SOLUTION INTRAVENOUS
Status: DISCONTINUED | OUTPATIENT
Start: 2017-02-07 | End: 2017-02-08 | Stop reason: HOSPADM

## 2017-02-07 RX ADMIN — INSULIN LISPRO 2 UNITS: 100 INJECTION, SOLUTION INTRAVENOUS; SUBCUTANEOUS at 21:14

## 2017-02-07 RX ADMIN — ONDANSETRON 4 MG: 2 INJECTION INTRAMUSCULAR; INTRAVENOUS at 20:05

## 2017-02-07 RX ADMIN — HYDROMORPHONE HYDROCHLORIDE 1 MG: 1 INJECTION, SOLUTION INTRAMUSCULAR; INTRAVENOUS; SUBCUTANEOUS at 00:54

## 2017-02-07 RX ADMIN — SODIUM CHLORIDE 150 ML/HR: 9 INJECTION, SOLUTION INTRAVENOUS at 02:01

## 2017-02-07 RX ADMIN — BENAZEPRIL HYDROCHLORIDE 20 MG: 5 TABLET, COATED ORAL at 09:16

## 2017-02-07 RX ADMIN — SODIUM CHLORIDE 150 ML/HR: 9 INJECTION, SOLUTION INTRAVENOUS at 09:17

## 2017-02-07 RX ADMIN — HYDROCODONE BITARTRATE AND ACETAMINOPHEN 1 TABLET: 5; 325 TABLET ORAL at 20:13

## 2017-02-07 RX ADMIN — AMLODIPINE BESYLATE 5 MG: 5 TABLET ORAL at 20:13

## 2017-02-07 RX ADMIN — TRAMADOL HYDROCHLORIDE 50 MG: 50 TABLET, COATED ORAL at 09:17

## 2017-02-07 RX ADMIN — ROPIVACAINE HYDROCHLORIDE 12 ML/HR: 2 INJECTION, SOLUTION EPIDURAL; INFILTRATION at 20:05

## 2017-02-07 RX ADMIN — CEFAZOLIN SODIUM 2 G: 2 INJECTION, SOLUTION INTRAVENOUS at 09:15

## 2017-02-07 RX ADMIN — ASPIRIN 325 MG: 325 TABLET, DELAYED RELEASE ORAL at 09:16

## 2017-02-07 RX ADMIN — ROPIVACAINE HYDROCHLORIDE 12 ML/HR: 2 INJECTION, SOLUTION EPIDURAL; INFILTRATION at 02:00

## 2017-02-07 RX ADMIN — HYDROCODONE BITARTRATE AND ACETAMINOPHEN 1 TABLET: 5; 325 TABLET ORAL at 12:51

## 2017-02-07 RX ADMIN — DOCUSATE SODIUM AND SENNOSIDES 2 TABLET: 8.6; 5 TABLET, FILM COATED ORAL at 09:17

## 2017-02-07 RX ADMIN — HYDROCODONE BITARTRATE AND ACETAMINOPHEN 1 TABLET: 5; 325 TABLET ORAL at 06:06

## 2017-02-07 RX ADMIN — ONDANSETRON 4 MG: 2 INJECTION INTRAMUSCULAR; INTRAVENOUS at 10:59

## 2017-02-07 NOTE — PROGRESS NOTES
"Snadip Feliz  [id number]  8/24/1931    Visit Vitals   • /72   • Pulse 69   • Temp 98.9 °F (37.2 °C) (Tympanic)   • Resp 18   • Ht 66\" (167.6 cm)   • Wt 152 lb (68.9 kg)   • SpO2 95%   • BMI 24.53 kg/m2       Lab Results (last 24 hours)     Procedure Component Value Units Date/Time    OR Potassium [[id number]]  (Normal) Collected:  02/06/17 1357    Specimen:  Blood Updated:  02/06/17 1417     Potassium, OR 4.02 mmol/L     Basic Metabolic Panel [[id number]]  (Abnormal) Collected:  02/07/17 0538    Specimen:  Blood Updated:  02/07/17 0630     Glucose 130 (H) mg/dL      BUN 15 mg/dL      Creatinine 0.80 mg/dL      Sodium 135 mmol/L      Potassium 4.0 mmol/L      Chloride 105 mmol/L      CO2 24.0 mmol/L      Calcium 9.0 mg/dL      eGFR Non African Amer 92 mL/min/1.73      BUN/Creatinine Ratio 18.8      Anion Gap 6.0 mmol/L     Narrative:       National [employer] Guidelines    Stage                           Description                             GFR                      1                               Normal or High                          90+  2                               Mild decrease                            60-89  3                               Moderate decrease                   30-59  4                               Severe decrease                       15-29  5                               Kidney failure                             <15    CBC & Differential [[id number]] Collected:  02/07/17 0538    Specimen:  Blood Updated:  02/07/17 0646    Narrative:       The following orders were created for panel order CBC & Differential.  Procedure                               Abnormality         Status                     ---------                               -----------         ------                     CBC Auto Differential[[id number]]         Abnormal            Final result                 Please view results for these tests on the individual orders.    CBC Auto Differential [[id number]]  " (Abnormal) Collected:  02/07/17 0538    Specimen:  Blood Updated:  02/07/17 0646     WBC 9.97 10*3/mm3      RBC 3.65 (L) 10*6/mm3      Hemoglobin 11.6 (L) g/dL      Hematocrit 34.1 (L) %      MCV 93.4 fL      MCH 31.8 (H) pg      MCHC 34.0 g/dL      RDW 12.6 %      RDW-SD 43.5 fl      MPV 9.7 fL      Platelets 244 10*3/mm3      Neutrophil % 75.4 (H) %      Lymphocyte % 9.4 (L) %      Monocyte % 11.5 %      Eosinophil % 3.1 (H) %      Basophil % 0.4 %      Immature Grans % 0.2 %      Neutrophils, Absolute 7.51 10*3/mm3      Lymphocytes, Absolute 0.94 10*3/mm3      Monocytes, Absolute 1.15 (H) 10*3/mm3      Eosinophils, Absolute 0.31 (H) 10*3/mm3      Basophils, Absolute 0.04 10*3/mm3      Immature Grans, Absolute 0.02 10*3/mm3           Patient Care Team:  Kelly Robison MD as PCP - General (Family Medicine)    SUBJECTIVE  Pain well controlled.  Good start in physical therapy.    PHYSICAL EXAM  Incision clean and dry  Minimal thigh and leg swelling  Neurovascularly intact to knee and toes    Principal Problem:    S/P left unicompartmental knee replacement  Active Problems:    A-fib    HTN (hypertension)    Osteoporosis    Arthritis of knee, left      PLAN / DISPOSITION:  Hemoglobin stable no transfusion anticipated  Continue regional block  Discharge home today/tomorrow depending on physical therapy and pain control    Richard Bennett MD  02/07/17  8:17 AM

## 2017-02-07 NOTE — PLAN OF CARE
Problem: Patient Care Overview (Adult)  Goal: Plan of Care Review  Outcome: Ongoing (interventions implemented as appropriate)    02/07/17 1135   Coping/Psychosocial Response Interventions   Plan Of Care Reviewed With patient   Patient Care Overview   Progress progress toward functional goals as expected   Outcome Evaluation   Outcome Summary/Follow up Plan SWETHA anthony completed this date. Pt demo bed mobility, transfers, and mobility to bathroom with CGA. Issued AE for LB ADL and initiated education on use. Recommend home with assist and home health therapy upon D/C.          Problem: Knee Replacement, Total (Adult)  Goal: Signs and Symptoms of Listed Potential Problems Will be Absent or Manageable (Knee Replacement, Total)  Outcome: Ongoing (interventions implemented as appropriate)    02/07/17 1135   Knee Replacement, Total   Problems Assessed (Total Knee Replacement) functional decline/self care deficit   Problems Present (Total Knee Replacement) functional decline/self care deficit         Problem: Inpatient Occupational Therapy  Goal: Bed Mobility Goal LTG- OT  Outcome: Ongoing (interventions implemented as appropriate)    02/07/17 1135   Bed Mobility OT LTG   Bed Mobility OT LTG, Date Established 02/07/17   Bed Mobility OT LTG, Time to Achieve by discharge   Bed Mobility OT LTG, Activity Type supine to sit/sit to supine   Bed Mobility OT LTG, [place] Level supervision required;verbal cues required   Bed Mobility OT LTG, Assist Device leg        Goal: Transfer Training Goal 1 LTG- OT  Outcome: Ongoing (interventions implemented as appropriate)    02/07/17 1135   Transfer Training OT LTG   Transfer Training OT LTG, Date Established 02/07/17   Transfer Training OT LTG, Time to Achieve by discharge   Transfer Training OT LTG, Activity Type sit to stand/stand to sit;tub   Transfer Training OT LTG, [place] Level contact guard assist;verbal cues required   Transfer Training OT LTG, Assist Device walker,  rolling;shower chair   Transfer Training OT LTG, Additional Goal completed sit to stand with CGA this date   Transfer Training OT LTG, Outcome goal partially met       Goal: Patient Education Goal LTG- OT  Outcome: Ongoing (interventions implemented as appropriate)    02/07/17 1135   Patient Education OT LTG   Patient Education OT LTG, Date Established 02/07/17   Patient Education OT LTG, Time to Achieve by discharge   Patient Education OT LTG, Education Type precautions per surgeon;positioning;posture/body mechanics;1 hand/staci technique;home safety;adaptive equipment mgmt;energy conservation;work simplification   Patient Education OT LTG, Education Understanding demonstrates adequately;verbalizes understanding       Goal: LB Dressing Goal LTG- OT  Outcome: Ongoing (interventions implemented as appropriate)    02/07/17 1135   LB Dressing OT LTG   LB Dressing Goal OT LTG, Date Established 02/07/17   LB Dressing Goal OT LTG, Time to Achieve by discharge   LB Dressing Goal OT LTG, [place] Level supervision required;verbal cues required   LB Dressing Goal OT LTG, Adaptive Equipment reacher;shoe horn, long handled;sock-aid

## 2017-02-07 NOTE — PLAN OF CARE
Problem: Patient Care Overview (Adult)  Goal: Plan of Care Review  Outcome: Ongoing (interventions implemented as appropriate)    02/07/17 0924   Coping/Psychosocial Response Interventions   Plan Of Care Reviewed With patient   Outcome Evaluation   Outcome Summary/Follow up Plan PT eval complete; pt walks in [place] x65 ft and t/f with CGA, VC for sequencing of all mobility. Pt is [place] but participates in ther ex well. Pt to measure ROM in P.M (appears to be 90 degrees flexion in sitting).          Problem: Inpatient Physical Therapy  Goal: Bed Mobility Goal LTG- PT  Outcome: Ongoing (interventions implemented as appropriate)    02/07/17 0924   Bed Mobility PT LTG   Bed Mobility PT LTG, Date Established 02/07/17   Bed Mobility PT LTG, Time to Achieve 2 wks   Bed Mobility PT LTG, Activity Type supine to sit/sit to supine   Bed Mobility PT LTG, [place] Level independent       Goal: Transfer Training Goal 1 LTG- PT  Outcome: Ongoing (interventions implemented as appropriate)    02/07/17 0924   Transfer Training PT LTG   Transfer Training PT LTG, Date Established 02/07/17   Transfer Training PT LTG, Time to Achieve 2 wks   Transfer Training PT LTG, Activity Type sit to stand/stand to sit   Transfer Training PT LTG, [place] Level conditional independence   Transfer Training PT LTG, Assist Device walker, rolling       Goal: Gait Training Goal LTG- PT  Outcome: Ongoing (interventions implemented as appropriate)    02/07/17 0924   Gait Training PT LTG   Gait Training Goal PT LTG, Date Established 02/07/17   Gait Training Goal PT LTG, Time to Achieve 2 wks   Gait Training Goal PT LTG, [place] Level conditional independence   Gait Training Goal PT LTG, Assist Device walker, rolling   Gait Training Goal PT LTG, Distance to Achieve 350       Goal: Stair Training Goal LTG- PT  Outcome: Ongoing (interventions implemented as appropriate)    02/07/17 0924   Stair Training PT LTG   Stair Training Goal PT LTG, Date  Established 02/07/17   Stair Training Goal PT LTG, Time to Achieve 2 wks   Stair Training Goal PT LTG, Number of Steps 2   Stair Training Goal PT LTG, [place] Level contact guard assist   Stair Training Goal PT LTG, Assist Device walker, rolling       Goal: Range of Motion Goal LTG- PT  Outcome: Ongoing (interventions implemented as appropriate)    02/07/17 0924   Range of Motion PT LTG   Range of Motion Goal PT LTG, Date Established 02/07/17   Range of Motion Goal PT LTG, Time to Achieve 2 wks   Range fo Motion Goal PT LTG, Joint L knee   Range of Motion Goal PT LTG, AROM Measure 0-90

## 2017-02-07 NOTE — PROGRESS NOTES
"IM progress note      Sandip Feliz  [id number]  1931     LOS: 1 day     Attending: Satinder Avalos MD    Primary Care Provider: Kelly Robison MD      Chief Complaint/Reason for visit:  Left knee pain    Subjective   Doing well. Pain control is good. Has ambulated in halls with PT. Denies f/c/n/v/sob/cp.  Good progress. wy    Objective     Vital Signs  Blood pressure 143/80, pulse 92, temperature 96.2 °F (35.7 °C), temperature source Oral, resp. rate 18, height 66\" (167.6 cm), weight 152 lb (68.9 kg), SpO2 96 %.  Temp (24hrs), Av °F (36.7 °C), Min:96.2 °F (35.7 °C), Max:98.9 °F (37.2 °C)      Intake/Output:    Intake/Output Summary (Last 24 hours) at 17 1455  Last data filed at 17 1300   Gross per 24 hour   Intake   2340 ml   Output   1215 ml   Net   1125 ml       Nutrition: PO    Respiratory: RA    Physical Therapy: pt walks in [place]rell x65 ft and t/f with CGA, VC for sequencing of all mobility. Pt is [place] but participates in ther ex well. Pt to measure ROM in P.M (appears to be 90 degrees flexion in sitting).     Physical Exam:     General Appearance:    Alert, cooperative, in no acute distress. [place]   Head:    Normocephalic, without obvious abnormality, atraumatic    Lungs:     Normal effort, symmetric chest rise, no crepitus, clear to      auscultation bilaterally              Heart:    Regular rhythm and normal rate, normal S1 and S2   Abdomen:     Normal bowel sounds, no masses, no organomegaly, soft        non-tender, non-distended, no guarding, no rebound                tenderness   Extremities:   No clubbing, cyanosis or edema.  No deformities. Left knee ACE wrap CDI. Nerve block present   Pulses:   Pulses palpable and equal bilaterally   Skin:   No bleeding, bruising or rash   Neurologic:   Moves all extremities with no obvious focal motor deficit.  Cranial nerves 2 - 12 grossly intact. Flexion and dorsiflexion intact bilateral feet.       Results Review:     I reviewed the " patient's new clinical results.     Results from last 7 days  Lab Units 02/07/17  0538   WBC 10*3/mm3 9.97   HEMOGLOBIN g/dL 11.6*   HEMATOCRIT % 34.1*   PLATELETS 10*3/mm3 244       Results from last 7 days  Lab Units 02/07/17  0538   SODIUM mmol/L 135   POTASSIUM mmol/L 4.0   CHLORIDE mmol/L 105   TOTAL CO2 mmol/L 24.0   BUN mg/dL 15   CREATININE mg/dL 0.80   CALCIUM mg/dL 9.0   GLUCOSE mg/dL 130*     I reviewed the patient's new imaging including images and reports.    All medications reviewed.     amLODIPine 5 mg Oral Nightly   aspirin 325 mg Oral Daily   benazepril 20 mg Oral Q24H   insulin lispro 2-7 Units Subcutaneous 4x Daily AC & at Bedtime   sennosides-docusate sodium 2 tablet Oral BID       acetaminophen 650 mg Q4H PRN   acetaminophen 650 mg Q4H PRN   bisacodyl 10 mg Daily PRN   bisacodyl 10 mg Daily PRN   dextrose 25 g Q15 Min PRN   dextrose 15 g Q15 Min PRN   docusate sodium 100 mg BID PRN   glucagon (human recombinant) 1 mg Q15 Min PRN   hydrALAZINE 10 mg Q6H PRN   HYDROcodone-acetaminophen 1 tablet Q4H PRN   HYDROmorphone 1 mg Q2H PRN   magnesium hydroxide 10 mL Daily PRN   ondansetron 4 mg Q6H PRN   promethazine 12.5 mg Q6H PRN   Or     promethazine 12.5 mg Q6H PRN   sodium chloride 500 mL TID PRN   sodium chloride 1-10 mL PRN   traMADol 50 mg Q6H PRN       Assessment/Plan   Principal Problem:    S/P left unicompartmental knee replacement  Active Problems:    HTN (hypertension)    Osteoporosis    Arthritis of knee, left    Acute blood loss anemia, mild, asymptomatic    Paroxysmal atrial fibrillation      Plan  1. PT/OT- WBAT LLE  2. Pain control-prns   3. IS-encouraged  4. DVT proph- mechs/ASA   Restart xarelto when ok with Dr. Bennett  5. Bowel regimen  6. Monitor post-op labs  7. DC planning for home with HHPT      Satinder Avalos MD  02/07/17  2:55 PM

## 2017-02-07 NOTE — PROGRESS NOTES
Acute Care - Physical Therapy Initial Evaluation  [facility]     Patient Name: Sandip Feliz  : 1931  MRN: [id number]  Today's Date: 2017   Onset of Illness/Injury or Date of Surgery Date: 17  Date of Referral to PT: 17  Referring Physician: MD Donald      Admit Date: 2017     Visit Dx:    ICD-10-CM ICD-9-CM   1. Impaired functional mobility, balance, gait, and endurance Z74.09 V49.89     Patient Active Problem List   Diagnosis   • A-fib   • HTN (hypertension)   • Osteoporosis   • Arthritis of knee, left   • S/P left unicompartmental knee replacement     Past Medical History   Diagnosis Date   • A-fib    • Arthritis    • Hearing impairment    • Hypertension    • Melanoma    • Wears dentures    • Wears glasses    • Wears hearing aid      Past Surgical History   Procedure Laterality Date   • Stomach surgery     • Appendectomy     • Cataract extraction     • Skin cancer excision       MELANOMA          PT ASSESSMENT (last 72 hours)      PT Evaluation       17 2019    Rehab Evaluation    Document Type therapy note (daily note)  -     Subjective Information no complaints;agree to therapy  -     Symptoms Noted During/After Treatment fatigue;other (see comments)  -     Symptoms Noted Comment PT reported same level of pain throughout therapy  -     General Information    Patient Profile Review yes  -EH     Onset of Illness/Injury or Date of Surgery Date 17  -     Referring Physician MD Donald  -     General Observations Pt in fowlers, Adductor canal Nerve catheter intact, IV intact, bed alarm on  -     Pertinent History Of Current Problem PT with worsening L knee OA with pain isolated over pedial joint line. Pain did not respond to conservative treatment and was interrupting pt function and sleep. Pt s/p Unilateral knee replacement  -     Precautions/Limitations fall precautions  -     Prior Level of Function min assist:;all household mobility    unable to go out into community easily; difficulty LBD  -     Equipment Currently Used at Home walker, rolling;cane, straight;commode;shower chair  - walker, rolling  -    Plans/Goals Discussed With patient;spouse/S.O.;agreed upon  -     Risks Reviewed patient:;spouse/S.O.:;LOB;increased discomfort  -     Benefits Reviewed patient:;spouse/S.O.:;improve function;increase independence  -     Barriers to Rehab none identified  -     Living Environment    Lives With spouse  -EH spouse  -KB    Living Arrangements house  - house  -KB    Home Accessibility bed and bath on same level;tub/shower is not walk in;stairs to enter home  - bed and bath on same level  -    Number of Stairs to Enter Home 2   or 1 dependent upon entrance  -     Stair Railings at Home none  - none  -    Type of Financial/Environmental Concern  none  -    Transportation Available  car;family or friend will provide  -KB    Clinical Impression    Date of Referral to PT 02/06/17  -     PT Diagnosis Decreased functional mobility, strength, ROM after sx  -     Criteria for Skilled Therapeutic Interventions Met yes;treatment indicated  -     Rehab Potential good, to achieve stated therapy goals  -     Pain Assessment    Pain Assessment 0-10  -EH     Pain Score 4  -EH     Post Pain Score 4  -EH     Pain Type Acute pain;Surgical pain  -EH     Pain Location Knee  -     Pain Orientation Left  -EH     Pain Intervention(s) Ambulation/increased activity;Repositioned  -     Multiple Pain Sites Yes  -     Cognitive Assessment/Intervention    Current Cognitive/Communication Assessment functional  -     Orientation Status oriented x 4  -EH     Follows Commands/Answers Questions 100% of the time;able to follow single-step instructions;needs cueing   Hard of hearing  -     Personal Safety decreased awareness, need for assist;decreased awareness, need for safety;other (see comments)   [place]; attempts to anticipate cues  -      Personal Safety Interventions gait belt;fall prevention program maintained;supervised activity  -     ROM (Range of Motion)    General ROM Detail L Knee appears to reach 90 this date; ankles hips WFL esperanza.  -     MMT (Manual Muscle Testing)    General MMT Assessment Detail RLE 4/5 knee extension; LLE not formally tested due to pain; BLE ankle DF 5/5  -     Bed Mobility, Assessment/Treatment    Bed Mobility, Assistive Device bed rails;head of bed elevated  -     Bed Mob, Supine to Sit, [place] supervision required  -     Bed Mob, Sit to Supine, [place] not tested  -     Transfer Assessment/Treatment    Transfers, Sit-Stand [place] contact guard assist;verbal cues required  -     Transfers, Stand-Sit [place] contact guard assist;verbal cues required  -     Transfers, Sit-Stand-Sit, Assist Device rolling walker;elevated surface  -     Toilet Transfer, [place] contact guard assist;verbal cues required  -     Toilet Transfer, Assistive Device rolling walker;bedside commode without drop arms  -     Transfer, Comment cues for full turn to chair/BSC, backing up to chair, pulling walker close, changing hand placement to BSC, advancing LLE and lowering to BSC/chair.  -     Gait Assessment/Treatment    Gait, [place] Level contact guard assist;verbal cues required  -     Gait, Assistive Device rolling walker  -     Gait, Distance (Feet) 65  -     Gait, Gait Pattern Analysis swing-to gait  -     Gait, Gait Deviations left:;weight-shifting ability decreased;toe-to-floor clearance decreased;right:;step length decreased;bilateral:;decreased heel strike;forward flexed posture;double stance time increased  -     Gait, Safety Issues weight-shifting ability decreased;sequencing ability decreased;step length decreased;balance decreased during turns  -     Gait, Impairments pain;strength decreased;ROM decreased  -     Gait, Comment Pt demos step to gait with forward  flexed posture, downward gaze, decreased step length esperanza. Pt holds walker too far out. VC for holding walker close and posture with minimal improvement.  -     Therapy Exercises    Exercise Protocols total knee   unicompartmental  -     Total Knee Exercises left:;10 reps;ankle pumps/circles;quad set;glut set;SLR;hip abduction/adduction;LAQ;SAQ   sitting heel slides  -     Sensory Assessment/Intervention    Sensory Impairment --   light touch intact at abkles/feet esperanza  -     Positioning and Restraints    Pre-Treatment Position in bed  -     Post Treatment Position chair  -     In Chair notified nsg;reclined;call light within reach;encouraged to call for assist;with family/caregiver  -       User Key  (r) = Recorded By, (t) = Taken By, (c) = Cosigned By    Initials Name Provider Type     Samantha Bello, PT Physical Therapist    BEATRIZ Montero, RN Registered Nurse          Physical Therapy Education     Title: PT OT SLP Therapies (Active)     Topic: Physical Therapy (Active)     Point: Mobility training (Active)    Learning Progress Summary    Learner Readiness Method Response Comment Documented by Status   Patient Acceptance E [facility] 02/07/17 0923 Active   Significant Other Acceptance E [facility] 02/07/17 0923 Active               Point: Home exercise program (Active)    Learning Progress Summary    Learner Readiness Method Response Comment Documented by Status   Patient Acceptance E [facility] 02/07/17 0923 Active   Significant Other Acceptance E [facility] 02/07/17 0923 Active               Point: Body mechanics (Active)    Learning Progress Summary    Learner Readiness Method Response Comment Documented by Status   Patient Acceptance E [facility] 02/07/17 0923 Active   Significant Other Acceptance E [facility] 02/07/17 0923 Active               Point: Precautions (Active)    Learning Progress Summary    Learner Readiness Method Response Comment Documented by Status   Patient Acceptance E [facility] 02/07/17 0923  Active   Significant Other Acceptance E NR   02/07/17 0923 Active                      User Key     Initials Effective Dates Name Provider Type Discipline     06/19/15 -  Samantha Bello, PT Physical Therapist PT                PT Recommendation and Plan  Anticipated Equipment Needs At Discharge:  (TBA)  Anticipated Discharge Disposition: home with home health ([facility])  Planned Therapy Interventions: balance training, bed mobility training, gait training, home exercise program, stretching, strengthening, stair training, transfer training  PT Frequency: 2 times/day  Plan of Care Review  Plan Of Care Reviewed With: patient  Outcome Summary/Follow up Plan: PT eval complete; pt walks in [place]rell x65 ft and t/f with CGA, VC for sequencing of all mobility. Pt is [place] but participates in ther ex well. Pt to measure ROM in P.M (appears to be 90 degrees flexion in sitting).           IP PT Goals       02/07/17 0924          Bed Mobility PT LTG    Bed Mobility PT LTG, Date Established 02/07/17  -      Bed Mobility PT LTG, Time to Achieve 2 wks  -EH      Bed Mobility PT LTG, Activity Type supine to sit/sit to supine  -      Bed Mobility PT LTG, [place] Level independent  -EH      Transfer Training PT LTG    Transfer Training PT LTG, Date Established 02/07/17  -      Transfer Training PT LTG, Time to Achieve 2 wks  -EH      Transfer Training PT LTG, Activity Type sit to stand/stand to sit  -EH      Transfer Training PT LTG, [place] Level conditional independence  -EH      Transfer Training PT LTG, Assist Device walker, rolling  -EH      Gait Training PT LTG    Gait Training Goal PT LTG, Date Established 02/07/17  -      Gait Training Goal PT LTG, Time to Achieve 2 wks  -EH      Gait Training Goal PT LTG, [place] Level conditional independence  -EH      Gait Training Goal PT LTG, Assist Device walker, rolling  -EH      Gait Training Goal PT LTG, Distance to Achieve 350  -EH      Stair Training PT LTG     Stair Training Goal PT LTG, Date Established 02/07/17  -      Stair Training Goal PT LTG, Time to Achieve 2 wks  -      Stair Training Goal PT LTG, Number of Steps 2  -      Stair Training Goal PT LTG, [place] Level contact guard assist  -      Stair Training Goal PT LTG, Assist Device walker, rolling  -      Range of Motion PT LTG    Range of Motion Goal PT LTG, Date Established 02/07/17  -      Range of Motion Goal PT LTG, Time to Achieve 2 wks  -      Range fo Motion Goal PT LTG, Joint L knee  -      Range of Motion Goal PT LTG, AROM Measure 0-90   -        User Key  (r) = Recorded By, (t) = Taken By, (c) = Cosigned By    Initials Name Provider Type    ROYA Bello, PT Physical Therapist                Outcome Measures       02/07/17 0810          How much help from another person do you currently need...    Turning from your back to your side while in flat bed without using bedrails? 4  -EH      Moving from lying on back to sitting on the side of a flat bed without bedrails? 3  -EH      Moving to and from a bed to a chair (including a wheelchair)? 3  -EH      Standing up from a chair using your arms (e.g., wheelchair, bedside chair)? 3  -EH      Climbing 3-5 steps with a railing? 2  -EH      To walk in hospital room? 3  -EH      AM-PAC 6 Clicks Score 18  -      Functional Assessment    Outcome Measure Options AM-PAC 6 Clicks Basic Mobility (PT)  -        User Key  (r) = Recorded By, (t) = Taken By, (c) = Cosigned By    Initials Name Provider Type    ROYA Bello, FABIANA Physical Therapist           Time Calculation:         PT Charges       02/07/17 0929          Time Calculation    Start Time 0810  -      PT Received On 02/07/17  -      PT Goal Re-Cert Due Date 02/17/17  -      Time Calculation- PT    Total Timed Code Minutes- PT 25 minute(s)  -        User Key  (r) = Recorded By, (t) = Taken By, (c) = Cosigned By    Initials Name Provider Type    ROYA Singh  KENNEDY Bello, PT Physical Therapist          Therapy Charges for Today     Code Description Service Date Service Provider Modifiers Qty    12419374076 HC GAIT TRAINING EA 15 MIN 2/7/2017 Samantha Bello, PT GP 1    63021836644 HC PT THER PROC EA 15 MIN 2/7/2017 Samantha Bello, PT GP 1    95259098660 HC PT EVAL MOD COMPLEXITY 4 2/7/2017 Samantha Bello, PT GP 1          PT G-Codes  Outcome Measure Options: AM-PAC 6 Clicks Basic Mobility (PT)      Samantha Bello, PT  2/7/2017

## 2017-02-07 NOTE — PROGRESS NOTES
Acute Care - Occupational Therapy Initial Evaluation  [facility]     Patient Name: Sandip Feliz  : 1931  MRN: [id number]  Today's Date: 2017  Onset of Illness/Injury or Date of Surgery Date: 17  Date of Referral to OT: 17  Referring Physician: MD Donald    Admit Date: 2017       ICD-10-CM ICD-9-CM   1. Impaired functional mobility, balance, gait, and endurance Z74.09 V49.89   2. Impaired mobility and ADLs Z74.09 799.89     Patient Active Problem List   Diagnosis   • A-fib   • HTN (hypertension)   • Osteoporosis   • Arthritis of knee, left   • S/P left unicompartmental knee replacement   • Acute blood loss anemia, mild, asymptomatic     Past Medical History   Diagnosis Date   • A-fib    • Arthritis    • Hearing impairment    • Hypertension    • Melanoma    • Wears dentures    • Wears glasses    • Wears hearing aid      Past Surgical History   Procedure Laterality Date   • Stomach surgery     • Appendectomy     • Cataract extraction     • Skin cancer excision       MELANOMA   • Knee arthroplasty unicompartmental Left 2017     Procedure: UNICOMPARTMENTAL ARTHROPLASTY LEFT knee;  Surgeon: Richard Bennett MD;  Location: [facility];  Service:           OT ASSESSMENT FLOWSHEET (last 72 hours)      OT Evaluation       17 1026 17 0810 17 2019 17 1405       Rehab Evaluation    Document Type evaluation  -EL therapy note (daily note)  -EH       Subjective Information agree to therapy;complains of;pain;nausea/vomiting  -EL no complaints;agree to therapy  -EH       Patient Effort, Rehab Treatment good  -EL        Symptoms Noted During/After Treatment other (see comments)   nausea   -EL fatigue;other (see comments)  -EH       Symptoms Noted Comment RN present and aware; gave meds   -EL PT reported same level of pain throughout therapy  -EH       General Information    Patient Profile Review yes  -EL yes  -EH       Onset of Illness/Injury or Date of Surgery Date 17   -EL 02/06/17  -       Referring Physician MD Donald  - MD Donald  -       General Observations Pt sitting up in chair on arrival, R adductor nerve catheter, IV heplocked, RN present, preparing to use bathroom  -EL Pt in fowlers, Adductor canal Nerve catheter intact, IV intact, bed alarm on  -       Pertinent History Of Current Problem Pt admitted for surgical management of L knee pain and dysfunction that failed to improve with conservative measures. Pt is POD #1 L knee medial unicompartmental arthroplasty. Pt reports PTA he was unable to walk without use of RW fro past month due to severe knee pain and impaired standing balance.   -EL PT with worsening L knee OA with pain isolated over pedial joint line. Pain did not respond to conservative treatment and was interrupting pt function and sleep. Pt s/p Unilateral knee replacement  -       Precautions/Limitations fall precautions   L adductor nerve catheter   -EL fall precautions  -       Prior Level of Function min assist:;all household mobility;community mobility;dressing;bathing;max assist:;home management;cleaning;cooking;driving;shopping  -EL min assist:;all household mobility   unable to go out into community easily; difficulty LBD  -EH       Equipment Currently Used at Home cane, straight;walker, rolling;commode;shower chair   pt reports use of RW at home x1 month PTA   -EL walker, rolling;cane, straight;commode;shower chair  -EH walker, rolling  -      Plans/Goals Discussed With patient and family;agreed upon  -EL patient;spouse/S.O.;agreed upon  -       Risks Reviewed patient and family:;increased discomfort  -EL patient:;spouse/S.O.:;LOB;increased discomfort  -EH       Benefits Reviewed patient and family:;improve function;increase independence  -EL patient:;spouse/S.O.:;improve function;increase independence  -EH       Barriers to Rehab none identified  -EL none identified  -EH       Living Environment    Lives With spouse  -EL spouse  -EH  spouse  -KB      Living Arrangements house  -EL house  -EH house  -KB      Home Accessibility stairs to enter home;tub/shower is not walk in;bed and bath on same level  -EL bed and bath on same level;tub/shower is not walk in;stairs to enter home  - bed and bath on same level  -KB      Number of Stairs to Enter Home 2  -EL 2   or 1 dependent upon entrance  -       Stair Railings at Home  none  -EH none  -KB      Type of Financial/Environmental Concern   none  -KB      Transportation Available   car;family or friend will provide  -KB      Clinical Impression    Date of Referral to OT 02/06/17  -EL        OT Diagnosis decreased independence with ADLs  -EL        Impairments Found (describe specific impairments) gait, locomotion, and balance  -EL        Patient/Family Goals Statement walk without pain   -EL        Criteria for Skilled Therapeutic Interventions Met yes;treatment indicated  -EL        Rehab Potential good, to achieve stated therapy goals  -EL        Therapy Frequency daily   per priority policy   -EL        Anticipated Equipment Needs At Discharge bathing equipment;dressing equipment   issued  ADL kit  -EL        Anticipated Discharge Disposition home with assist;home with home health  -EL        Functional Level Prior    Ambulation   1-->assistive equipment  -KB 1-->assistive equipment  -MY     Transferring   0-->independent  -KB 0-->independent  -MY     Toileting   0-->independent  -KB 0-->independent  -MY     Bathing   0-->independent  -KB 0-->independent  -MY     Dressing   0-->independent  -KB 0-->independent  -MY     Eating   0-->independent  -KB 0-->independent  -MY     Communication   0-->understands/communicates without difficulty  -KB 0-->understands/communicates without difficulty  -MY     Swallowing   0-->swallows foods/liquids without difficulty  -KB 0-->swallows foods/liquids without difficulty  -MY     Pain Assessment    Pain Assessment 0-10  -EL 0-10  -       Pain Score 4  -EL 4   -       Post Pain Score 4  -EL 4  -EH       Pain Type Acute pain  -EL Acute pain;Surgical pain  -EH       Pain Location Knee  -EL Knee  -EH       Pain Orientation Left;Anterior  -EL Left  -EH       Pain Intervention(s) Ambulation/increased activity;Repositioned  -EL Ambulation/increased activity;Repositioned  -EH       Response to Interventions tolerated   -        Multiple Pain Sites  Yes  -       Vision Assessment/Intervention    Visual Impairment WFL with corrective lenses  -        Cognitive Assessment/Intervention    Current Cognitive/Communication Assessment functional  -EL functional  -       Orientation Status oriented x 4  -EL oriented x 4  -EH       Follows Commands/Answers Questions able to follow single-step instructions;100% of the time;needs cueing  - 100% of the time;able to follow single-step instructions;needs cueing   Hard of hearing  -       Personal Safety good awareness, safety precautions  - decreased awareness, need for assist;decreased awareness, need for safety;other (see comments)   [place]; attempts to anticipate cues  -       Personal Safety Interventions elopement precautions initiated;fall prevention program maintained;gait belt;nonskid shoes/slippers when out of bed  - gait belt;fall prevention program maintained;supervised activity  -       ROM (Range of Motion)    General ROM no range of motion deficits identified  -        General ROM Detail  L Knee appears to reach 90 this date; ankles hips WFL esperanza.  -       MMT (Manual Muscle Testing)    General MMT Assessment upper extremity strength deficits identified  -        General MMT Assessment Detail not formally tested; B UE appear grossly 4/5   - RLE 4/5 knee extension; LLE not formally tested due to pain; BLE ankle DF 5/5  -       Bed Mobility, Assessment/Treatment    Bed Mobility, Assistive Device bed rails;head of bed elevated;leg   - bed rails;head of bed elevated  -       Bed Mob, Supine to  Sit, Silva  supervision required  -       Bed Mob, Sit to Supine, Silva contact guard assist;verbal cues required  -EL not tested  -EH       Bed Mobility, Safety Issues decreased use of legs for bridging/pushing  -EL        Bed Mobility, Impairments ROM decreased;strength decreased;pain  -EL        Bed Mobility, Comment pt sitting up in chair on arrival, returned to bed at conclusion on treatment; pt used leg  for bed mobility with VC   -EL        Transfer Assessment/Treatment    Transfers, Sit-Stand Silva contact guard assist;verbal cues required  -EL contact guard assist;verbal cues required  -       Transfers, Stand-Sit Silva contact guard assist;verbal cues required  -EL contact guard assist;verbal cues required  -       Transfers, Sit-Stand-Sit, Assist Device rolling walker  -EL rolling walker;elevated surface  -       Toilet Transfer, Silva contact guard assist;verbal cues required  -EL contact guard assist;verbal cues required  -       Toilet Transfer, Assistive Device rolling walker   raised toilet seat   -EL rolling walker;bedside commode without drop arms  -       Transfer, Safety Issues step length decreased;weight-shifting ability decreased  -EL        Transfer, Impairments ROM decreased;strength decreased  -EL        Transfer, Comment VC for hand placement, advance L LE forward prior to transfers   -EL cues for full turn to chair/BSC, backing up to chair, pulling walker close, changing hand placement to BSC, advancing LLE and lowering to BSC/chair.  -       Functional Mobility    Functional Mobility- Ind. Level contact guard assist;verbal cues required  -        Functional Mobility- Device rolling walker  -EL        Functional Mobility-Distance (Feet) 20  -EL        Functional Mobility- Safety Issues step length decreased;weight-shifting ability decreased  -EL        Functional Mobility- Comment VC for sequencing/ body mechanics with RW   -EL         Lower Body Bathing Assessment/Training    LB Bathing Assess/Train, Comment issued AE for LB bathing and initiated education on use   -EL        Lower Body Dressing Assessment/Training    LB Dressing Assess/Train, Clothing Type doffing:;donning:;slipper socks  -EL        LB Dressing Assess/Train, Assist Device reacher;sock-aid  -EL        LB Dressing Assess/Train, Position sitting;edge of bed  -EL        LB Dressing Assess/Train, [place] minimum assist (75% patient effort);verbal cues required  -EL        LB Dressing Assess/Train, Impairments ROM decreased;strength decreased  -EL        LB Dressing Assess/Train, Comment issued AE for LB dressing and initiated education on use; pt required min assist for use of AE to doff/don socks. Pt's wife reports she is available to assist with ADL prn upon D/C home   -EL        Toileting Assessment/Training    Toileting Assess/Train, Assistive Device raised toilet seat  -EL        Toileting Assess/Train, Position sitting  -EL        Toileting Assess/Train, Indepen Level supervision required  -EL        Motor Skills/Interventions    Additional Documentation Balance Skills Training (Group)  -EL        Balance Skills Training    Sitting-Level of Assistance Close supervision  -EL        Sitting-Balance Support Feet supported  -        Sitting-Balance Activities Trunk control activities;Reaching for objects;Forward lean  -EL        Standing-Level of Assistance Contact guard  -EL        Static Standing Balance Support assistive device  -EL        Standing-Balance Activities Weight Shift A-P;Weight Shift R-L  -EL        Gait Balance-Level of Assistance Contact guard  -EL        Gait Balance Support assistive device  -EL        Gait Balance Activities scanning environment R/L;side-stepping  -EL        Therapy Exercises    Exercise Protocols  total knee   unicompartmental  -       Total Knee Exercises  left:;10 reps;ankle pumps/circles;quad set;glut set;SLR;hip  abduction/adduction;LAQ;SAQ   sitting heel slides  -EH       Sensory Assessment/Intervention    Sensory Impairment  --   light touch intact at abkles/feet esperanza  -EH       General Therapy Interventions    Planned Therapy Interventions adaptive equipment training;activity intolerance;ADL retraining;balance training;bed mobility training;transfer training  -EL        Adaptive Equipment Training issued LB ADL kit; initiated education   -EL        ADL Retraining educated on adaptive techniques for LB ADL  -EL        Bed Mobility Training issued leg  and educated on use   -EL        Transfer Training educated on proper body mechanics for transfers   -EL        Positioning and Restraints    Pre-Treatment Position sitting in chair/recliner  -EL in bed  -EH       Post Treatment Position bed  -EL chair  -EH       In Bed notified nsg;supine;call light within reach;encouraged to call for assist;exit alarm on;with family/caregiver  -EL        In Chair --  -EL notified nsg;reclined;call light within reach;encouraged to call for assist;with family/caregiver  -EH         User Key  (r) = Recorded By, (t) = Taken By, (c) = Cosigned By    Initials Name Effective Dates     Samantha Bello, PT 06/19/15 -     MY Tracy Crowley, RN 06/16/16 -     KB Daniella Montero, KATIE 06/16/16 -     EL Iliana Parr OT 06/08/16 -            Occupational Therapy Education     Title: PT OT SLP Therapies (Active)     Topic: Occupational Therapy (Done)     Point: ADL training (Done)    Description: Instruct learner(s) on proper safety adaptation and remediation techniques during self care or transfers.   Instruct in proper use of assistive devices.    Learning Progress Summary    Learner Readiness Method Response Comment Documented by Status   Patient Acceptance E VU Educated on role of OT, sx precautions, safety for transfers, home safety upon D/C, and AE for LB ADLs. EL 02/07/17 1134 Done   Family Acceptance E VU Educated on role of  OT, sx precautions, safety for transfers, home safety upon D/C, and AE for LB ADLs.  02/07/17 1134 Done               Point: Precautions (Done)    Description: Instruct learner(s) on prescribed precautions during self-care and functional transfers.    Learning Progress Summary    Learner Readiness Method Response Comment Documented by Status   Patient Acceptance E VU Educated on role of OT, sx precautions, safety for transfers, home safety upon D/C, and AE for LB ADLs. EL 02/07/17 1134 Done   Family Acceptance E VU Educated on role of OT, sx precautions, safety for transfers, home safety upon D/C, and AE for LB ADLs. EL 02/07/17 1134 Done               Point: Body mechanics (Done)    Description: Instruct learner(s) on proper positioning and spine alignment during self-care, functional mobility activities and/or exercises.    Learning Progress Summary    Learner Readiness Method Response Comment Documented by Status   Patient Acceptance E VU Educated on role of OT, sx precautions, safety for transfers, home safety upon D/C, and AE for LB ADLs. EL 02/07/17 1134 Done   Family Acceptance E VU Educated on role of OT, sx precautions, safety for transfers, home safety upon D/C, and AE for LB ADLs.  02/07/17 1134 Done                      User Key     Initials Effective Dates Name Provider Type Discipline    EL 06/08/16 -  Iliana Parr OT Occupational Therapist OT                  OT Recommendation and Plan  Anticipated Equipment Needs At Discharge: bathing equipment, dressing equipment (issued LB ADL kit)  Anticipated Discharge Disposition: home with assist, home with home health  Planned Therapy Interventions: adaptive equipment training, activity intolerance, ADL retraining, balance training, bed mobility training, transfer training  Therapy Frequency: daily (per priority policy )  Plan of Care Review  Plan Of Care Reviewed With: patient  Progress: progress toward functional goals as expected  Outcome  Summary/Follow up Plan: OT anthony completed this date. Pt demo bed mobility, transfers, and mobility to bathroom with CGA. Issued AE for LB ADL and initiated education on use. Recommend home with assist and home health therapy upon D/C.           OT Goals       02/07/17 1135          Bed Mobility OT LTG    Bed Mobility OT LTG, Date Established 02/07/17  -EL      Bed Mobility OT LTG, Time to Achieve by discharge  -EL      Bed Mobility OT LTG, Activity Type supine to sit/sit to supine  -EL      Bed Mobility OT LTG, [place] Level supervision required;verbal cues required  -EL      Bed Mobility OT LTG, Assist Device leg   -EL      Transfer Training OT LTG    Transfer Training OT LTG, Date Established 02/07/17  -EL      Transfer Training OT LTG, Time to Achieve by discharge  -EL      Transfer Training OT LTG, Activity Type sit to stand/stand to sit;tub  -EL      Transfer Training OT LTG, [place] Level contact guard assist;verbal cues required  -EL      Transfer Training OT LTG, Assist Device walker, rolling;shower chair  -EL      Transfer Training OT LTG, Additional Goal completed sit to stand with CGA this date  -EL      Transfer Training OT LTG, Outcome goal partially met  -EL      Patient Education OT LTG    Patient Education OT LTG, Date Established 02/07/17  -EL      Patient Education OT LTG, Time to Achieve by discharge  -EL      Patient Education OT LTG, Education Type precautions per surgeon;positioning;posture/body mechanics;1 hand/staci technique;home safety;adaptive equipment mgmt;energy conservation;work simplification  -EL      Patient Education OT LTG, Education Understanding demonstrates adequately;verbalizes understanding  -EL      LB Dressing OT LTG    LB Dressing Goal OT LTG, Date Established 02/07/17  -EL      LB Dressing Goal OT LTG, Time to Achieve by discharge  -EL      LB Dressing Goal OT LTG, [place] Level supervision required;verbal cues required  -EL      LB Dressing Goal OT  LTG, Adaptive Equipment reacher;shoe horn, long handled;sock-aid  -EL        User Key  (r) = Recorded By, (t) = Taken By, (c) = Cosigned By    Initials Name Provider Type    EL Iliana Parr OT Occupational Therapist                Outcome Measures       02/07/17 1026 02/07/17 0810       How much help from another person do you currently need...    Turning from your back to your side while in flat bed without using bedrails?  4  -EH     Moving from lying on back to sitting on the side of a flat bed without bedrails?  3  -EH     Moving to and from a bed to a chair (including a wheelchair)?  3  -EH     Standing up from a chair using your arms (e.g., wheelchair, bedside chair)?  3  -EH     Climbing 3-5 steps with a railing?  2  -EH     To walk in hospital room?  3  -EH     AM-PAC 6 Clicks Score  18  -EH     How much help from another is currently needed...    Putting on and taking off regular lower body clothing? 3  -EL      Bathing (including washing, rinsing, and drying) 3  -EL      Toileting (which includes using toilet bed pan or urinal) 3  -EL      Putting on and taking off regular upper body clothing 3  -EL      Taking care of personal grooming (such as brushing teeth) 4  -EL      Eating meals 4  -EL      Score 20  -EL      Functional Assessment    Outcome Measure Options AM-PAC 6 Clicks Daily Activity (OT)  -EL AM-PAC 6 Clicks Basic Mobility (PT)  -EH       User Key  (r) = Recorded By, (t) = Taken By, (c) = Cosigned By    Initials Name Provider Type     Samantha Bello, PT Physical Therapist    REGLA Parr OT Occupational Therapist          Time Calculation:   OT Start Time: 1026    Therapy Charges for Today     Code Description Service Date Service Provider Modifiers Qty    43153970176  OT THERAPEUTIC ACT EA 15 MIN 2/7/2017 Iliana Parr OT GO 2    [id number]  OT EVAL MOD COMPLEXITY 3 2/7/2017 Iliana Parr OT GO 1               Iliana Parr OT  2/7/2017

## 2017-02-07 NOTE — PLAN OF CARE
Problem: Patient Care Overview (Adult)  Goal: Plan of Care Review  Outcome: Ongoing (interventions implemented as appropriate)    02/07/17 0425   Coping/Psychosocial Response Interventions   Plan Of Care Reviewed With patient   Patient Care Overview   Progress no change   Outcome Evaluation   Outcome Summary/Follow up Plan Patient complaining of moderate anterior knee pain. Ropivacaine increased last PM to 16ml/hr for 2 hours. Still complaining of anterior knee pain. Suplementing with PRN PO and IV meds. Voiding well. VSS.          Problem: Knee Replacement, Total (Adult)  Goal: Signs and Symptoms of Listed Potential Problems Will be Absent or Manageable (Knee Replacement, Total)  Outcome: Ongoing (interventions implemented as appropriate)    Problem: Fall Risk (Adult)  Goal: Identify Related Risk Factors and Signs and Symptoms  Outcome: Ongoing (interventions implemented as appropriate)  Goal: Absence of Falls  Outcome: Ongoing (interventions implemented as appropriate)    Problem: Perioperative Period (Adult)  Goal: Signs and Symptoms of Listed Potential Problems Will be Absent or Manageable (Perioperative Period)  Outcome: Ongoing (interventions implemented as appropriate)

## 2017-02-07 NOTE — PROGRESS NOTES
Discharge Planning Assessment  [facility]     Patient Name: Sandip Feliz  MRN: [id number]  Today's Date: 2/7/2017    Admit Date: 2/6/2017          Discharge Needs Assessment       02/07/17 1821    Living Environment    Lives With spouse    Living Arrangements house    Provides Primary Care For no one    Primary Care Provided By spouse/significant other    Quality Of Family Relationships unable to assess    Able to Return to Prior Living Arrangements yes    Living Arrangement Comments one story home with 2 steps to enter and none on inside, per pt.    Wife is available to assist.        Discharge Needs Assessment    Concerns To Be Addressed denies needs/concerns at this time    Equipment Currently Used at Home cane, straight;walker, rolling    Transportation Available car;family or friend will provide    Discharge Disposition still a patient    Discharge Planning Comments Pt states, home with his wife with [facility].     Wife available to transport.              Discharge Plan       02/07/17 1824    Case Management/Social Work Plan    Plan Home with [facility] for P.T.    Patient/Family In Agreement With Plan yes    Additional Comments Met with pt. at bedside.   Pt states, resides in [place] with his wife in a one story home.   2 steps to enter and none on inside.   Home Health:  never used.   DME:  has a cane and rolling walker.    Pt states insurance is current and he uses [facility] for pharmacy and [facility] for long term medications.       Transportation:   pt states he can drive but hasn't for a month or so, his wife drives mostly.    d/c plan:   Home with his wife and [facility] for P.T.,  referral given to rep. Trivedi.      Wife available to transport at discharge.     case mgt following.        Discharge Placement     No information found                Demographic Summary       02/07/17 1577    Referral Information    Referral Source admission list;physician     Contact Information    Permission Granted to Share Information With             Functional Status       02/07/17 1810    Functional Status Prior    Ambulation 1-->assistive equipment    Transferring 0-->independent    Toileting 0-->independent    Bathing 0-->independent    Dressing 0-->independent    Eating 0-->independent    Swallowing 0-->swallows foods/liquids without difficulty    IADL    Medications independent    Meal Preparation independent    Housekeeping independent    Laundry independent    Shopping independent    Oral Care independent    Employment/Financial    Employment/Finance Comments current with his listed insurances.    Pharm:  Uses [facility] on [address] and [facility] for long term scripts               Psychosocial     None            Abuse/Neglect     None            Legal     None            Substance Abuse     None            Patient Forms     None          Deborah Harkins RN

## 2017-02-07 NOTE — PROGRESS NOTES
Acute Care - Physical Therapy Treatment Note  [facility]     Patient Name: Sandip Feliz  : 1931  MRN: [id number]  Today's Date: 2017  Onset of Illness/Injury or Date of Surgery Date: 17  Date of Referral to PT: 17  Referring Physician: MD Donald    Admit Date: 2017    Visit Dx:    ICD-10-CM ICD-9-CM   1. Impaired functional mobility, balance, gait, and endurance Z74.09 V49.89   2. Impaired mobility and ADLs Z74.09 799.89     Patient Active Problem List   Diagnosis   • HTN (hypertension)   • Osteoporosis   • Arthritis of knee, left   • S/P left unicompartmental knee replacement   • Acute blood loss anemia, mild, asymptomatic   • Paroxysmal atrial fibrillation               Adult Rehabilitation Note       17 1530          Rehab Assessment/Intervention    Discipline physical therapist  -      Document Type therapy note (daily note)  -      Subjective Information agree to therapy;no complaints  -      Patient Effort, Rehab Treatment excellent  -      Symptoms Noted During/After Treatment none  -      Precautions/Limitations fall precautions   adductor nerve catheter  -      Recorded by [] Samantha Bello, PT      Pain Assessment    Pain Assessment 0-10  -      Pain Score 4  -      Post Pain Score 4  -      Pain Type Acute pain  -      Pain Location Knee  -      Pain Orientation Right;Left  -      Pain Intervention(s) Ambulation/increased activity;Medication (See MAR);Repositioned  -      Response to Interventions tolerated  -EH      Recorded by [EH] Samantha Bello, PT      Cognitive Assessment/Intervention    Current Cognitive/Communication Assessment functional  -      Orientation Status oriented x 4  -      Follows Commands/Answers Questions able to follow multi-step instructions  -      Personal Safety good awareness, safety precautions  -      Personal Safety Interventions fall prevention program maintained;gait belt;muscle  strengthening facilitated  -      Recorded by [] Samantha Bello, PT      ROM (Range of Motion)    General ROM Detail lacking 5 degrees from 0 to 90 degrees of flexion on L  -      Recorded by [] Samantha Bello PT      Bed Mobility, Assessment/Treatment    Bed Mobility, Assistive Device bed rails;head of bed elevated  -      Bed Mob, Sit to Supine, [place] supervision required  -      Bed Mobility, Safety Issues decreased use of legs for bridging/pushing  -      Bed Mobility, Impairments ROM decreased;strength decreased;pain  -      Recorded by [] Samantha Bello PT      Transfer Assessment/Treatment    Transfers, Sit-Stand [place] contact guard assist;verbal cues required  -      Transfers, Stand-Sit [place] verbal cues required;contact guard assist  -      Transfers, Sit-Stand-Sit, Assist Device rolling walker  -      Transfer, Comment VC for hand placement, advancement of surgical LE.   -      Recorded by [] Samantha Bello, PT      Gait Assessment/Treatment    Gait, [place] Level contact guard assist  -      Gait, Assistive Device rolling walker  -      Gait, Distance (Feet) 300  -      Gait, Gait Pattern Analysis swing-through gait  -      Gait, Gait Deviations left:;antalgic;weight-shifting ability decreased;right:;toe-to-floor clearance decreased;decreased heel strike;step length decreased;bilateral:;forward flexed posture  -      Gait, Safety Issues weight-shifting ability decreased  -      Gait, Impairments pain;ROM decreased;strength decreased  -      Gait, Comment PT progresses to step through gait pattern. VC for improvement of posture and forward gaze.  -      Recorded by [] Samantha Bello, PT      Therapy Exercises    Exercise Protocols total knee  -      Total Knee Exercises left:;10 reps;completed protocol;ankle pumps/circles;quad set;glut set;SLR;SAQ;LAQ;heel slide stretch   heel slide stretch in sitting  -       Recorded by [] Samantha Bello, PT      Positioning and Restraints    Pre-Treatment Position in bed  -EH      Post Treatment Position chair  -EH      In Chair notified nsg;reclined;call light within reach;encouraged to call for assist;with family/caregiver  -      Recorded by [] Samantha Bello, PT        User Key  (r) = Recorded By, (t) = Taken By, (c) = Cosigned By    Initials Name Effective Dates     Samantha Bello, PT 06/19/15 -                 IP PT Goals       02/07/17 1637 02/07/17 0924       Bed Mobility PT LTG    Bed Mobility PT LTG, Date Established  02/07/17  -     Bed Mobility PT LTG, Time to Achieve  2 wks  -EH     Bed Mobility PT LTG, Activity Type  supine to sit/sit to supine  -     Bed Mobility PT LTG, [place] Level  independent  -EH     Bed Mobility PT LTG, Outcome goal ongoing  -      Transfer Training PT LTG    Transfer Training PT LTG, Date Established  02/07/17  -     Transfer Training PT LTG, Time to Achieve  2 wks  -EH     Transfer Training PT LTG, Activity Type  sit to stand/stand to sit  -     Transfer Training PT LTG, [place] Level  conditional independence  -EH     Transfer Training PT LTG, Assist Device  walker, rolling  -     Transfer Training PT LTG, Outcome goal ongoing  -      Gait Training PT LTG    Gait Training Goal PT LTG, Date Established  02/07/17  -     Gait Training Goal PT LTG, Time to Achieve  2 wks  -EH     Gait Training Goal PT LTG, [place] Level  conditional independence  -EH     Gait Training Goal PT LTG, Assist Device  walker, rolling  -     Gait Training Goal PT LTG, Distance to Achieve  350  -EH     Gait Training Goal PT LTG, Outcome goal ongoing  -      Stair Training PT LTG    Stair Training Goal PT LTG, Date Established  02/07/17  -     Stair Training Goal PT LTG, Time to Achieve  2 wks  -EH     Stair Training Goal PT LTG, Number of Steps  2  -     Stair Training Goal PT LTG, [place] Level  contact guard  assist  -EH     Stair Training Goal PT LTG, Assist Device  walker, rolling  -EH     Stair Training Goal PT LTG, Outcome goal ongoing  -EH      Range of Motion PT LTG    Range of Motion Goal PT LTG, Date Established  02/07/17  -EH     Range of Motion Goal PT LTG, Time to Achieve  2 wks  -EH     Range fo Motion Goal PT LTG, Joint  L knee  -EH     Range of Motion Goal PT LTG, AROM Measure  0-90   -EH     Range of Motion Goal PT LTG, Outcome goal partially met  -EH        User Key  (r) = Recorded By, (t) = Taken By, (c) = Cosigned By    Initials Name Provider Type     Samantha Bello, PT Physical Therapist          Physical Therapy Education     Title: PT OT SLP Therapies (Done)     Topic: Physical Therapy (Done)     Point: Mobility training (Done)    Learning Progress Summary    Learner Readiness Method Response Comment Documented by Status   Patient Acceptance E VU,NR   02/07/17 1637 Done    Acceptance E [facility] 02/07/17 0923 Active   Significant Other Acceptance E VU,[facility] 02/07/17 1637 Done    Acceptance E [facility] 02/07/17 0923 Active               Point: Home exercise program (Done)    Learning Progress Summary    Learner Readiness Method Response Comment Documented by Status   Patient Acceptance E VU,NR   02/07/17 1637 Done    Acceptance E [facility] 02/07/17 0923 Active   Significant Other Acceptance E VU,[facility] 02/07/17 1637 Done    Acceptance E [facility] 02/07/17 0923 Active               Point: Body mechanics (Done)    Learning Progress Summary    Learner Readiness Method Response Comment Documented by Status   Patient Acceptance E VU,NR   02/07/17 1637 Done    Acceptance E [facility] 02/07/17 0923 Active   Significant Other Acceptance E VU,[facility] 02/07/17 1637 Done    Acceptance E [facility] 02/07/17 0923 Active               Point: Precautions (Done)    Learning Progress Summary    Learner Readiness Method Response Comment Documented by Status   Patient Acceptance E VU,NR   02/07/17 1637 Done    Acceptance E NR    02/07/17 0923 Active   Significant Other Acceptance E VU,NR   02/07/17 1637 Done    Acceptance E NR   02/07/17 0923 Active                      User Key     Initials Effective Dates Name Provider Type Discipline     06/19/15 -  Samantha Bello, PT Physical Therapist PT                    PT Recommendation and Plan  Anticipated Equipment Needs At Discharge:  (TBA)  Anticipated Discharge Disposition: home with home health ([facility])  Planned Therapy Interventions: balance training, bed mobility training, gait training, home exercise program, stretching, strengthening, stair training, transfer training  PT Frequency: 2 times/day  Plan of Care Review  Plan Of Care Reviewed With: patient  Progress: improving  Outcome Summary/Follow up Plan: Pt ROM  in L knee lacking 5 degrees from zero with overpressure to 90 degrees of flexion. PT walked in cantrell 300ft with CGA. To attempt stairs tomorrow          Outcome Measures       02/07/17 1530 02/07/17 1026 02/07/17 0810    How much help from another person do you currently need...    Turning from your back to your side while in flat bed without using bedrails? 4  -EH  4  -EH    Moving from lying on back to sitting on the side of a flat bed without bedrails? 3  -EH  3  -EH    Moving to and from a bed to a chair (including a wheelchair)? 3  -EH  3  -EH    Standing up from a chair using your arms (e.g., wheelchair, bedside chair)? 3  -EH  3  -EH    Climbing 3-5 steps with a railing? 3  -EH  2  -EH    To walk in hospital room? 3  -EH  3  -EH    AM-PAC 6 Clicks Score 19  -EH  18  -EH    How much help from another is currently needed...    Putting on and taking off regular lower body clothing?  3  -EL     Bathing (including washing, rinsing, and drying)  3  -EL     Toileting (which includes using toilet bed pan or urinal)  3  -EL     Putting on and taking off regular upper body clothing  3  -EL     Taking care of personal grooming (such as brushing teeth)  4  -EL      Eating meals  4  -EL     Score  20  -EL     Functional Assessment    Outcome Measure Options AM-PAC 6 Clicks Basic Mobility (PT)  -EH AM-PAC 6 Clicks Daily Activity (OT)  -EL AM-PAC 6 Clicks Basic Mobility (PT)  -      User Key  (r) = Recorded By, (t) = Taken By, (c) = Cosigned By    Initials Name Provider Type     Samantha Bello, PT Physical Therapist    EL Iliana Parr, OT Occupational Therapist           Time Calculation:         PT Charges       02/07/17 1640 02/07/17 0929       Time Calculation    Start Time 1530  - 0810  -     PT Received On 02/07/17  -EH 02/07/17  -     PT Goal Re-Cert Due Date 02/17/17  - 02/17/17  -     Time Calculation- PT    Total Timed Code Minutes- PT 37 minute(s)  -EH 25 minute(s)  -       User Key  (r) = Recorded By, (t) = Taken By, (c) = Cosigned By    Initials Name Provider Type     Samantha Bello, PT Physical Therapist          Therapy Charges for Today     Code Description Service Date Service Provider Modifiers Qty    26026359963 HC GAIT TRAINING EA 15 MIN 2/7/2017 Samantha Bello, PT GP 1    47235444149 HC PT THER PROC EA 15 MIN 2/7/2017 Samantha Bello, PT GP 1    19040160080 HC PT EVAL MOD COMPLEXITY 4 2/7/2017 Samantha Bello, PT GP 1    86726435612 HC PT EVAL MOD COMPLEXITY 4 2/7/2017 Samantha Bello, PT GP 1    86833979736 HC GAIT TRAINING EA 15 MIN 2/7/2017 Samantha Bello, PT GP 1          PT G-Codes  Outcome Measure Options: AM-PAC 6 Clicks Basic Mobility (PT)    Samantha Bello, PT  2/7/2017

## 2017-02-07 NOTE — PROGRESS NOTES
[facility]    Acute pain service Inpatient Progress Note    Patient Name: Sandip Feliz  :  1931  MRN:  [id number]        Acute Pain  Service Inpatient Progress Note:    Analgesia:Good  Pain Score:4/10  LOC: alert and awake  Resp Status: room air  Cardiac: VS stable  Side Effects:None  Catheter Site:clean, dry and dressing intact  Cath type: peripheral nerve cath(MOOG pump)  Infusion rate: 12ml/hr  Catheter Plan:Catheter to remain Insitu and Continue catheter infusion rate unchanged

## 2017-02-07 NOTE — PLAN OF CARE
Problem: Patient Care Overview (Adult)  Goal: Plan of Care Review  Outcome: Ongoing (interventions implemented as appropriate)    02/07/17 1637   Coping/Psychosocial Response Interventions   Plan Of Care Reviewed With patient   Patient Care Overview   Progress improving   Outcome Evaluation   Outcome Summary/Follow up Plan Pt ROM in L knee lacking 5 degrees from zero with overpressure to 90 degrees of flexion. PT walked in cantrell 300ft with CGA. To attempt stairs tomorrow         Problem: Inpatient Physical Therapy  Goal: Bed Mobility Goal LTG- PT  Outcome: Ongoing (interventions implemented as appropriate)    02/07/17 0924 02/07/17 1637   Bed Mobility PT LTG   Bed Mobility PT LTG, Date Established 02/07/17 --    Bed Mobility PT LTG, Time to Achieve 2 wks --    Bed Mobility PT LTG, Activity Type supine to sit/sit to supine --    Bed Mobility PT LTG, [place] Level independent --    Bed Mobility PT LTG, Outcome --  goal ongoing       Goal: Transfer Training Goal 1 LTG- PT  Outcome: Ongoing (interventions implemented as appropriate)    02/07/17 0924 02/07/17 1637   Transfer Training PT LTG   Transfer Training PT LTG, Date Established 02/07/17 --    Transfer Training PT LTG, Time to Achieve 2 wks --    Transfer Training PT LTG, Activity Type sit to stand/stand to sit --    Transfer Training PT LTG, [place] Level conditional independence --    Transfer Training PT LTG, Assist Device walker, rolling --    Transfer Training PT LTG, Outcome --  goal ongoing       Goal: Gait Training Goal LTG- PT  Outcome: Ongoing (interventions implemented as appropriate)    02/07/17 0924 02/07/17 1637   Gait Training PT LTG   Gait Training Goal PT LTG, Date Established 02/07/17 --    Gait Training Goal PT LTG, Time to Achieve 2 wks --    Gait Training Goal PT LTG, [place] Level conditional independence --    Gait Training Goal PT LTG, Assist Device walker, rolling --    Gait Training Goal PT LTG, Distance to Achieve 350 --     Gait Training Goal PT LTG, Outcome --  goal ongoing       Goal: Stair Training Goal LTG- PT  Outcome: Ongoing (interventions implemented as appropriate)    02/07/17 0924 02/07/17 1637   Stair Training PT LTG   Stair Training Goal PT LTG, Date Established 02/07/17 --    Stair Training Goal PT LTG, Time to Achieve 2 wks --    Stair Training Goal PT LTG, Number of Steps 2 --    Stair Training Goal PT LTG, [place] Level contact guard assist --    Stair Training Goal PT LTG, Assist Device walker, rolling --    Stair Training Goal PT LTG, Outcome --  goal ongoing       Goal: Range of Motion Goal LTG- PT  Outcome: Ongoing (interventions implemented as appropriate)    02/07/17 0924 02/07/17 1637   Range of Motion PT LTG   Range of Motion Goal PT LTG, Date Established 02/07/17 --    Range of Motion Goal PT LTG, Time to Achieve 2 wks --    Range fo Motion Goal PT LTG, Joint L knee --    Range of Motion Goal PT LTG, AROM Measure 0-90  --    Range of Motion Goal PT LTG, Outcome --  goal partially met

## 2017-02-07 NOTE — OP NOTE
DATE OF PROCEDURE:  02/06/2017    PREOPERATIVE DIAGNOSIS: Left knee osteoarthritis.     POSTOPERATIVE DIAGNOSIS: Left knee osteoarthritis.     PROCEDURE PERFORMED: Left knee medial unicompartmental arthroplasty using DePuy Sigma HP cemented femur size #4; cemented tibia all polyethylene size #3, 8 mm thickness.     SURGEON: Richard Bennett MD    ASSISTANT: Samantha Arriaga PA-C     ANESTHESIA: Spinal.    INDICATIONS: An 85-year-old with endstage left knee pain refractory to aggressive nonoperative management. X-rays show complete loss of the medial joint space compared to the right knee which shows age-appropriate mild medial compartment narrowing. He has pain isolated over the medial joint line. Risks, benefits, indications, and rationale for left knee arthroplasty, partial versus total discussed at length with the patient. He signs his own consent after all questions were answered. He acknowledges the final decision for surgery will be made intraoperatively.     DESCRIPTION OF PROCEDURE: While in the OR, anesthesia was started with a satisfactory level. He was turned to the supine position, prepped and draped in a standard fashion from midthigh to the ankle using sliding leg interiano. The tourniquet was inflated to 300 mmHg. Total tourniquet time was 44 minutes. A standard anterior incision was made with medial parapatellar arthrotomy. A limited medial release was performed for exposure. Anterior bony resection was required for instrumentation. A tibia cut was made perpendicular to the mechanical axis or if in slight varus and also parallel to the medial wall of the femoral notch. The tibia was removed and sized between a size 3 and 4, and the size 3 had much better fit in the tibia with easy range of motion and no liftoff. Femur prepared in a standard fashion with distal femoral cut and then sized to a #4 before drill holes. Posterior cut and chamfers were made. Trials at this point showed easy full range of motion  with minimal anterior push in deep flexion. A posterior slope was thought to match the native tibia. The tibia was prepared in a standard fashion. Trials were removed and bone surfaces thoroughly irrigated. Standard cement technique was used for tibia and femur with excess cement removed during the curing process. Final components were assembled and reduced, and showed easy range of motion with minimal liftoff in deep flexion and corresponding compression in deep extension. Cement mantle had been included on the lateral wall of the prosthesis or medial wall of the eminence. Final components were accepted as satisfactory given the overall improvement in status. The wound was then thoroughly irrigated and closed in layers without a drain. A standard compressive dressing was applied after routine [place] dressing at the skin. The patient was stable to recovery having tolerated the procedure well throughout with all counts correct.      MD KIRSTIN Manuel/william  DD: 02/06/2017 17:55:02  DT: 02/06/2017 18:58:05  Voice Rec. ID #[id number]  Voice Original ID #[id number]  Doc ID #[id number]  Rev. #0  cc:

## 2017-02-08 VITALS
RESPIRATION RATE: 16 BRPM | HEART RATE: 81 BPM | TEMPERATURE: 98.1 F | WEIGHT: 152 LBS | DIASTOLIC BLOOD PRESSURE: 62 MMHG | BODY MASS INDEX: 24.43 KG/M2 | HEIGHT: 66 IN | OXYGEN SATURATION: 96 % | SYSTOLIC BLOOD PRESSURE: 136 MMHG

## 2017-02-08 LAB
GLUCOSE BLDC GLUCOMTR-MCNC: 111 MG/DL (ref 70–130)
GLUCOSE BLDC GLUCOMTR-MCNC: 127 MG/DL (ref 70–130)

## 2017-02-08 PROCEDURE — 90732 PPSV23 VACC 2 YRS+ SUBQ/IM: CPT | Performed by: INTERNAL MEDICINE

## 2017-02-08 PROCEDURE — 97116 GAIT TRAINING THERAPY: CPT

## 2017-02-08 PROCEDURE — 82962 GLUCOSE BLOOD TEST: CPT

## 2017-02-08 PROCEDURE — 25010000002 ROPIVACAINE PER 1 MG: Performed by: NURSE ANESTHETIST, CERTIFIED REGISTERED

## 2017-02-08 PROCEDURE — G0009 ADMIN PNEUMOCOCCAL VACCINE: HCPCS | Performed by: INTERNAL MEDICINE

## 2017-02-08 PROCEDURE — 25010000004 PNEUMOCOCCAL VAC POLYVALENT PER 0.5 ML: Performed by: INTERNAL MEDICINE

## 2017-02-08 PROCEDURE — 97110 THERAPEUTIC EXERCISES: CPT

## 2017-02-08 PROCEDURE — 97530 THERAPEUTIC ACTIVITIES: CPT

## 2017-02-08 RX ORDER — PSEUDOEPHEDRINE HCL 30 MG
100 TABLET ORAL 2 TIMES DAILY PRN
Qty: 60 EACH | Refills: 0 | Status: SHIPPED | OUTPATIENT
Start: 2017-02-08 | End: 2017-03-10

## 2017-02-08 RX ORDER — HYDROCODONE BITARTRATE AND ACETAMINOPHEN 5; 325 MG/1; MG/1
1 TABLET ORAL EVERY 4 HOURS PRN
Qty: 40 TABLET | Refills: 0 | Status: SHIPPED | OUTPATIENT
Start: 2017-02-08 | End: 2017-02-16

## 2017-02-08 RX ORDER — RIVAROXABAN 20 MG/1
10 TABLET, FILM COATED ORAL
Qty: 30 TABLET | Refills: 3
Start: 2017-02-08 | End: 2017-03-10

## 2017-02-08 RX ORDER — ROPIVACAINE HYDROCHLORIDE 2 MG/ML
12 INJECTION, SOLUTION EPIDURAL; INFILTRATION CONTINUOUS
Status: DISCONTINUED | OUTPATIENT
Start: 2017-02-08 | End: 2017-02-08 | Stop reason: HOSPADM

## 2017-02-08 RX ORDER — ROPIVACAINE HYDROCHLORIDE 2 MG/ML
12 INJECTION, SOLUTION EPIDURAL; INFILTRATION CONTINUOUS
Start: 2017-02-08 | End: 2017-03-10

## 2017-02-08 RX ADMIN — PNEUMOCOCCAL VACCINE POLYVALENT 0.5 ML
25; 25; 25; 25; 25; 25; 25; 25; 25; 25; 25; 25; 25; 25; 25; 25; 25; 25; 25; 25; 25; 25; 25 INJECTION, SOLUTION INTRAMUSCULAR; SUBCUTANEOUS at 14:55

## 2017-02-08 RX ADMIN — ASPIRIN 325 MG: 325 TABLET, DELAYED RELEASE ORAL at 09:17

## 2017-02-08 RX ADMIN — Medication 12 ML/HR: at 14:11

## 2017-02-08 RX ADMIN — HYDROCODONE BITARTRATE AND ACETAMINOPHEN 1 TABLET: 5; 325 TABLET ORAL at 02:46

## 2017-02-08 RX ADMIN — DOCUSATE SODIUM AND SENNOSIDES 2 TABLET: 8.6; 5 TABLET, FILM COATED ORAL at 09:17

## 2017-02-08 RX ADMIN — ROPIVACAINE HYDROCHLORIDE 12 ML/HR: 2 INJECTION, SOLUTION EPIDURAL; INFILTRATION at 11:41

## 2017-02-08 RX ADMIN — HYDROCODONE BITARTRATE AND ACETAMINOPHEN 1 TABLET: 5; 325 TABLET ORAL at 09:17

## 2017-02-08 RX ADMIN — BENAZEPRIL HYDROCHLORIDE 20 MG: 5 TABLET, COATED ORAL at 09:17

## 2017-02-08 RX ADMIN — HYDROCODONE BITARTRATE AND ACETAMINOPHEN 1 TABLET: 5; 325 TABLET ORAL at 14:56

## 2017-02-08 NOTE — PROGRESS NOTES
"Sandip Feliz  [id number]  8/24/1931    Visit Vitals   • /74 (BP Location: Right arm, Patient Position: Lying)   • Pulse 79   • Temp 98.3 °F (36.8 °C) (Tympanic)   • Resp 18   • Ht 66\" (167.6 cm)   • Wt 152 lb (68.9 kg)   • SpO2 95%   • BMI 24.53 kg/m2       Lab Results (last 24 hours)     Procedure Component Value Units Date/Time    POC Glucose Fingerstick [[id number]]  (Abnormal) Collected:  02/07/17 1707    Specimen:  Blood Updated:  02/07/17 1709     Glucose 137 (H) mg/dL     Narrative:       Verify with Lab Meter: [id number] : [id number] Arthur Otero    POC Glucose Fingerstick [[id number]]  (Abnormal) Collected:  02/07/17 2029    Specimen:  Blood Updated:  02/07/17 2031     Glucose 156 (H) mg/dL     Narrative:       Meter: [id number] : [id number] Viraj Clark    POC Glucose Fingerstick [[id number]]  (Normal) Collected:  02/08/17 0744    Specimen:  Blood Updated:  02/08/17 0753     Glucose 127 mg/dL     Narrative:       Meter: [id number] : [id number] Roel Germain          Patient Care Team:  Kelly Robison MD as PCP - General (Family Medicine)    SUBJECTIVE  Pain well controlled.  Good progress in physical therapy.    PHYSICAL EXAM  Incision benign  Minimal thigh swelling  Neurovascularly intact to knees and toes     Principal Problem:    S/P left unicompartmental knee replacement  Active Problems:    HTN (hypertension)    Osteoporosis    Arthritis of knee, left    Acute blood loss anemia, mild, asymptomatic    Paroxysmal atrial fibrillation      PLAN / DISPOSITION:  Discharge home today.  Discussed benefits of continuing AC/FNBC at home    Richard Bennett MD  02/08/17  8:22 AM  "

## 2017-02-08 NOTE — PLAN OF CARE
Problem: Patient Care Overview (Adult)  Goal: Plan of Care Review  Outcome: Ongoing (interventions implemented as appropriate)    02/08/17 1150   Coping/Psychosocial Response Interventions   Plan Of Care Reviewed With patient   Patient Care Overview   Progress progress toward functional goals as expected   Outcome Evaluation   Outcome Summary/Follow up Plan Going home with home healty. Has all equiptment needed         Problem: Inpatient Physical Therapy  Goal: Bed Mobility Goal LTG- PT  Outcome: Outcome(s) achieved Date Met:  02/08/17 02/07/17 0924 02/08/17 1150   Bed Mobility PT LTG   Bed Mobility PT LTG, Date Established 02/07/17 --    Bed Mobility PT LTG, Time to Achieve 2 wks --    Bed Mobility PT LTG, Activity Type supine to sit/sit to supine --    Bed Mobility PT LTG, [place] Level independent --    Bed Mobility PT LTG, Outcome --  goal met       Goal: Transfer Training Goal 1 LTG- PT  Outcome: Outcome(s) achieved Date Met:  02/08/17 02/07/17 0924 02/08/17 1150   Transfer Training PT LTG   Transfer Training PT LTG, Date Established 02/07/17 --    Transfer Training PT LTG, Time to Achieve 2 wks --    Transfer Training PT LTG, Activity Type sit to stand/stand to sit --    Transfer Training PT LTG, [place] Level conditional independence --    Transfer Training PT LTG, Assist Device walker, rolling --    Transfer Training PT LTG, Outcome --  goal met         02/07/17 0924 02/08/17 1150   Transfer Training PT LTG   Transfer Training PT LTG, Date Established 02/07/17 --    Transfer Training PT LTG, Time to Achieve 2 wks --    Transfer Training PT LTG, Activity Type sit to stand/stand to sit --    Transfer Training PT LTG, [place] Level conditional independence --    Transfer Training PT LTG, Assist Device walker, rolling --    Transfer Training PT LTG, Outcome --  goal partially met       Goal: Gait Training Goal LTG- PT  Outcome: Outcome(s) achieved Date Met:  02/08/17 02/07/17 0924  02/08/17 1150   Gait Training PT LTG   Gait Training Goal PT LTG, Date Established 02/07/17 --    Gait Training Goal PT LTG, Time to Achieve 2 wks --    Gait Training Goal PT LTG, [place] Level conditional independence --    Gait Training Goal PT LTG, Assist Device walker, rolling --    Gait Training Goal PT LTG, Distance to Achieve 350 --    Gait Training Goal PT LTG, Outcome --  goal met       Goal: Stair Training Goal LTG- PT  Outcome: Outcome(s) achieved Date Met:  02/08/17 02/07/17 0924 02/08/17 1150   Stair Training PT LTG   Stair Training Goal PT LTG, Date Established 02/07/17 --    Stair Training Goal PT LTG, Time to Achieve 2 wks --    Stair Training Goal PT LTG, Number of Steps 2 --    Stair Training Goal PT LTG, [place] Level contact guard assist --    Stair Training Goal PT LTG, Assist Device walker, rolling --    Stair Training Goal PT LTG, Outcome --  goal met       Goal: Range of Motion Goal LTG- PT  Outcome: Outcome(s) achieved Date Met:  02/08/17 02/07/17 0924 02/08/17 1150   Range of Motion PT LTG   Range of Motion Goal PT LTG, Date Established 02/07/17 --    Range of Motion Goal PT LTG, Time to Achieve 2 wks --    Range fo Motion Goal PT LTG, Joint L knee --    Range of Motion Goal PT LTG, AROM Measure 0-90  --    Range of Motion Goal PT LTG, Outcome --  goal met

## 2017-02-08 NOTE — NURSING NOTE
Acute Pain Service:  On-Q teaching completed with patient and spouse Angélica.  Video demonstration, handout and bracelet provided with [facility] on call central phone number.  Instructed to call with any questions or concerns.  Patient verbalized understanding.  Service will continue to follow until catheter DC'd.  Please contact patient at [phone] if needed.

## 2017-02-08 NOTE — THERAPY DISCHARGE NOTE
Acute Care - Physical Therapy Treatment Note/Discharge  [facility]     Patient Name: Sandip Feliz  : 1931  MRN: [id number]  Today's Date: 2017  Onset of Illness/Injury or Date of Surgery Date: 17  Date of Referral to PT: 17  Referring Physician: MD Donald    Admit Date: 2017    Visit Dx:    ICD-10-CM ICD-9-CM   1. Impaired functional mobility, balance, gait, and endurance Z74.09 V49.89   2. Impaired mobility and ADLs Z74.09 799.89   3. Healthcare maintenance Z00.00 V70.0     Patient Active Problem List   Diagnosis   • HTN (hypertension)   • Osteoporosis   • Arthritis of knee, left   • S/P left unicompartmental knee replacement   • Acute blood loss anemia, mild, asymptomatic   • Paroxysmal atrial fibrillation       Physical Therapy Education     Title: PT OT SLP Therapies (Done)     Topic: Physical Therapy (Done)     Point: Mobility training (Done)    Learning Progress Summary    Learner Readiness Method Response Comment Documented by Status   Patient Eager ANISHA FELIX,CHASTITY RICK educated in going up steps, hep, safety with mobility SC 17 1150 Done    Acceptance E VU,NR   17 1637 Done    Acceptance E NR   17 0923 Active   Family Eager ANISHA FELIX,CHASTITY RICK educated in going up steps, hep, safety with mobility SC 17 1150 Done   Significant Other Acceptance E VU,NR   17 1637 Done    Acceptance E NR   17 0923 Active               Point: Home exercise program (Done)    Learning Progress Summary    Learner Readiness Method Response Comment Documented by Status   Patient Eager ANISHA FELIX,CHASTITY RICK educated in going up steps, hep, safety with mobility SC 17 1150 Done    Acceptance E VU,NR   17 1637 Done    Acceptance E NR   17 0923 Active   Family Eager ANISHA FELIX,CHASTITY RICK educated in going up steps, hep, safety with mobility SC 17 1150 Done   Significant Other Acceptance E VU,NR   17 1637 Done    Acceptance E NR   17 0923 Active                Point: Body mechanics (Done)    Learning Progress Summary    Learner Readiness Method Response Comment Documented by Status   Patient Eager E,D,H VU,DU educated in going up steps, hep, safety with mobility SC 02/08/17 1150 Done    Acceptance E VU,NR   02/07/17 1637 Done    Acceptance E NR   02/07/17 0923 Active   Family Eager E,D,H VU,DU educated in going up steps, hep, safety with mobility SC 02/08/17 1150 Done   Significant Other Acceptance E VU,NR   02/07/17 1637 Done    Acceptance E NR   02/07/17 0923 Active               Point: Precautions (Done)    Learning Progress Summary    Learner Readiness Method Response Comment Documented by Status   Patient Eager E,D,H VU,DU educated in going up steps, hep, safety with mobility SC 02/08/17 1150 Done    Acceptance E VU,NR   02/07/17 1637 Done    Acceptance E NR   02/07/17 0923 Active   Family Eager E,D,H VU,DU educated in going up steps, hep, safety with mobility SC 02/08/17 1150 Done   Significant Other Acceptance E VU,NR   02/07/17 1637 Done    Acceptance E NR   02/07/17 0923 Active                      User Key     Initials Effective Dates Name Provider Type Discipline    SC 06/19/15 -  Danny Clark, PT Physical Therapist PT     06/19/15 -  Samantha Bello, PT Physical Therapist PT                    IP PT Goals       02/08/17 1150 02/07/17 1637 02/07/17 0924    Bed Mobility PT LTG    Bed Mobility PT LTG, Date Established   02/07/17  -    Bed Mobility PT LTG, Time to Achieve   2 wks  -    Bed Mobility PT LTG, Activity Type   supine to sit/sit to supine  -    Bed Mobility PT LTG, [place] Level   independent  -    Bed Mobility PT LTG, Outcome goal met  -SC goal ongoing  -     Transfer Training PT LTG    Transfer Training PT LTG, Date Established   02/07/17  -    Transfer Training PT LTG, Time to Achieve   2 wks  -EH    Transfer Training PT LTG, Activity Type   sit to stand/stand to sit  -    Transfer Training PT LTG,  [place] Level   conditional independence  -EH    Transfer Training PT LTG, Assist Device   walker, rolling  -EH    Transfer Training PT LTG, Outcome goal partially met  -SC goal ongoing  -     Gait Training PT LTG    Gait Training Goal PT LTG, Date Established   02/07/17  -    Gait Training Goal PT LTG, Time to Achieve   2 wks  -EH    Gait Training Goal PT LTG, [place] Level   conditional independence  -EH    Gait Training Goal PT LTG, Assist Device   walker, rolling  -EH    Gait Training Goal PT LTG, Distance to Achieve   350  -EH    Gait Training Goal PT LTG, Outcome goal met  -SC goal ongoing  -     Stair Training PT LTG    Stair Training Goal PT LTG, Date Established   02/07/17  -    Stair Training Goal PT LTG, Time to Achieve   2 wks  -EH    Stair Training Goal PT LTG, Number of Steps   2  -EH    Stair Training Goal PT LTG, [place] Level   contact guard assist  -EH    Stair Training Goal PT LTG, Assist Device   walker, rolling  -EH    Stair Training Goal PT LTG, Outcome goal met  -SC goal ongoing  -     Range of Motion PT LTG    Range of Motion Goal PT LTG, Date Established   02/07/17  -    Range of Motion Goal PT LTG, Time to Achieve   2 wks  -EH    Range fo Motion Goal PT LTG, Joint   L knee  -EH    Range of Motion Goal PT LTG, AROM Measure   0-90   -EH    Range of Motion Goal PT LTG, Outcome goal met  -SC goal partially met  -       User Key  (r) = Recorded By, (t) = Taken By, (c) = Cosigned By    Initials Name Provider Type    SC Danny Clark, PT Physical Therapist     Samantha Bello, PT Physical Therapist              Adult Rehabilitation Note       02/08/17 0945 02/07/17 1530       Rehab Assessment/Intervention    Discipline physical therapist  -SC physical therapist  -     Document Type therapy note (daily note)  -SC therapy note (daily note)  -     Subjective Information agree to therapy  -SC agree to therapy;no complaints  -     Patient Effort, Rehab Treatment  excellent  -SC excellent  -     Symptoms Noted During/After Treatment none  -SC none  -     Precautions/Limitations  fall precautions   adductor nerve catheter  -     Recorded by [SC] Danny Clark, PT [] Samantha Bello, PT     Pain Assessment    Pain Assessment Velazquez-Bae FACES  -SC 0-10  -EH     Velazquez-Bae FACES Pain Rating 2   2  -SC      Pain Score  4  -     Post Pain Score  4  -     Pain Type Acute pain  -SC Acute pain  -     Pain Location Knee  -SC Knee  -EH     Pain Orientation Right  -SC Right;Left  -     Pain Intervention(s) Repositioned  -SC Ambulation/increased activity;Medication (See MAR);Repositioned  -     Response to Interventions  tolerated  -EH     Recorded by [SC] Danny Clark, PT [] Samantha Bello, PT     Cognitive Assessment/Intervention    Current Cognitive/Communication Assessment functional  -SC functional  -     Orientation Status oriented x 4  -SC oriented x 4  -     Follows Commands/Answers Questions 100% of the time  -SC able to follow multi-step instructions  -     Personal Safety WNL/WFL  -SC good awareness, safety precautions  -     Personal Safety Interventions gait belt  -SC fall prevention program maintained;gait belt;muscle strengthening facilitated  -     Recorded by [SC] Danny Clark, PT [EH] Samantha Bello, PT     ROM (Range of Motion)    General ROM Detail l knee-  -SC lacking 5 degrees from 0 to 90 degrees of flexion on L  -EH     Recorded by [SC] Danny Clark, PT [] Samantha Bello, PT     Bed Mobility, Assessment/Treatment    Bed Mobility, Assistive Device  bed rails;head of bed elevated  -     Bed Mob, Sit to Supine, [place]  supervision required  -     Bed Mobility, Safety Issues  decreased use of legs for bridging/pushing  -     Bed Mobility, Impairments  ROM decreased;strength decreased;pain  -     Bed Mobility, Comment uic  -SC      Recorded by [SC] Danny Clark, PT [] Samantha Bello, PT      Transfer Assessment/Treatment    Transfers, Sit-Stand [place] supervision required;verbal cues required  -SC contact guard assist;verbal cues required  -     Transfers, Stand-Sit [place] supervision required;verbal cues required  -SC verbal cues required;contact guard assist  -     Transfers, Sit-Stand-Sit, Assist Device rolling walker  -SC rolling walker  -     Transfer, Comment cues for safety given--patient hard of hearing  -SC VC for hand placement, advancement of surgical LE.   -EH     Recorded by [SC] Danny Clark, PT [] Samantha Bello, PT     Gait Assessment/Treatment    Gait, [place] Level supervision required  -SC contact guard assist  -     Gait, Assistive Device rolling walker  -SC rolling walker  -     Gait, Distance (Feet) 400  -  -     Gait, Gait Pattern Analysis swing-through gait  -SC swing-through gait  -     Gait, Gait Deviations left:;antalgic  -SC left:;antalgic;weight-shifting ability decreased;right:;toe-to-floor clearance decreased;decreased heel strike;step length decreased;bilateral:;forward flexed posture  -     Gait, Safety Issues  weight-shifting ability decreased  -     Gait, Impairments impaired balance;strength decreased  -SC pain;ROM decreased;strength decreased  -     Gait, Comment cues for increasing wt shifting  -SC PT progresses to step through gait pattern. VC for improvement of posture and forward gaze.  -     Recorded by [SC] Danny Clark, PT [] Samantha Bello, PT     Stairs Assessment/Treatment    Number of Stairs 1  -SC      Stairs, Handrail Location none  -SC      Stairs, [place] Level verbal cues required;contact guard assist  -SC      Stairs, Assistive Device walker  -SC      Stairs, Comment cues for up one step backwards, wife present and assisted  -SC      Recorded by [SC] Danny Clark, PT      Balance Skills Training    Gait Balance-Level of Assistance Close supervision  -SC      Gait Balance Support  assistive device  -SC      Recorded by [SC] Danny Clark PT      Therapy Exercises    Exercise Protocols total knee  -SC total knee  -     Total Knee Exercises left:;10 reps;completed protocol  -SC left:;10 reps;completed protocol;ankle pumps/circles;quad set;glut set;SLR;SAQ;LAQ;heel slide stretch   heel slide stretch in sitting  -     Recorded by [SC] Danny Clark, PT [EH] Samantha Bello PT     Positioning and Restraints    Pre-Treatment Position sitting in chair/recliner  -SC in bed  -     Post Treatment Position chair  -SC chair  -     In Bed sitting;notified nsg;call light within reach;with family/caregiver  -SC      In Chair  notified nsg;reclined;call light within reach;encouraged to call for assist;with family/caregiver  -     Recorded by [SC] Danny Clark, PT [EH] Samantha Bello PT       User Key  (r) = Recorded By, (t) = Taken By, (c) = Cosigned By    Initials Name Effective Dates    SC Danny Clark, PT 06/19/15 -      Samantha Bello, PT 06/19/15 -           PT Recommendation and Plan  Anticipated Equipment Needs At Discharge:  (TBA)  Anticipated Discharge Disposition: home with home health  Planned Therapy Interventions: balance training, bed mobility training, gait training, home exercise program, stretching, strengthening, stair training, transfer training  PT Frequency: 2 times/day  Plan of Care Review  Plan Of Care Reviewed With: patient  Progress: progress toward functional goals as expected  Outcome Summary/Follow up Plan: Going home with home healty. Has all equiptment needed          Outcome Measures       02/08/17 0945 02/07/17 1530 02/07/17 1026    How much help from another person do you currently need...    Turning from your back to your side while in flat bed without using bedrails? 4  -SC 4  -EH     Moving from lying on back to sitting on the side of a flat bed without bedrails? 3  -SC 3  -EH     Moving to and from a bed to a chair (including a wheelchair)?  3  -SC 3  -EH     Standing up from a chair using your arms (e.g., wheelchair, bedside chair)? 3  -SC 3  -EH     Climbing 3-5 steps with a railing? 3  -SC 3  -EH     To walk in hospital room? 3  -SC 3  -EH     AM-PAC 6 Clicks Score 19  -SC 19  -EH     How much help from another is currently needed...    Putting on and taking off regular lower body clothing?   3  -EL    Bathing (including washing, rinsing, and drying)   3  -EL    Toileting (which includes using toilet bed pan or urinal)   3  -EL    Putting on and taking off regular upper body clothing   3  -EL    Taking care of personal grooming (such as brushing teeth)   4  -EL    Eating meals   4  -EL    Score   20  -EL    Functional Assessment    Outcome Measure Options AM-PAC 6 Clicks Basic Mobility (PT)  -SC AM-PAC 6 Clicks Basic Mobility (PT)  - AM-[facility] 6 Clicks Daily Activity (OT)  -EL      02/07/17 0810          How much help from another person do you currently need...    Turning from your back to your side while in flat bed without using bedrails? 4  -EH      Moving from lying on back to sitting on the side of a flat bed without bedrails? 3  -EH      Moving to and from a bed to a chair (including a wheelchair)? 3  -EH      Standing up from a chair using your arms (e.g., wheelchair, bedside chair)? 3  -EH      Climbing 3-5 steps with a railing? 2  -EH      To walk in hospital room? 3  -EH      AM-PAC 6 Clicks Score 18  -EH      Functional Assessment    Outcome Measure Options AM-[facility] 6 Clicks Basic Mobility (PT)  -        User Key  (r) = Recorded By, (t) = Taken By, (c) = Cosigned By    Initials Name Provider Type    ANAI Clark, PT Physical Therapist    EH Samantha Bello, PT Physical Therapist    EL Iliana Parr, OT Occupational Therapist           Time Calculation:         PT Charges       02/08/17 1152          Time Calculation    Start Time 0945  -SC      PT Received On 02/08/17  -SC      Time Calculation- PT    Total Timed Code Minutes- PT  39 minute(s)  -SC        User Key  (r) = Recorded By, (t) = Taken By, (c) = Cosigned By    Initials Name Provider Type    SC Danny Clark, PT Physical Therapist          Therapy Charges for Today     Code Description Service Date Service Provider Modifiers Qty    20907168979 HC GAIT TRAINING EA 15 MIN 2/8/2017 Danny Clark, PT GP 1    25704284006 HC PT THER PROC EA 15 MIN 2/8/2017 Danny Clark, PT GP 2          PT G-Codes  Outcome Measure Options: AM-PAC 6 Clicks Basic Mobility (PT)    PT Discharge Summary  Anticipated Discharge Disposition: home with home health  Reason for Discharge: Discharge from facility  Outcomes Achieved: Patient able to partially acheive established goals  Discharge Destination: Home with home health    Danny Clark, PT  2/8/2017

## 2017-02-08 NOTE — PROGRESS NOTES
Acute Care - Occupational Therapy Treatment Note  [facility]     Patient Name: Sandip Feliz  : 1931  MRN: [id number]  Today's Date: 2017  Onset of Illness/Injury or Date of Surgery Date: 17  Date of Referral to OT: 17  Referring Physician: MD Donald      Admit Date: 2017    Visit Dx:     ICD-10-CM ICD-9-CM   1. Impaired functional mobility, balance, gait, and endurance Z74.09 V49.89   2. Impaired mobility and ADLs Z74.09 799.89   3. Healthcare maintenance Z00.00 V70.0     Patient Active Problem List   Diagnosis   • HTN (hypertension)   • Osteoporosis   • Arthritis of knee, left   • S/P left unicompartmental knee replacement   • Acute blood loss anemia, mild, asymptomatic   • Paroxysmal atrial fibrillation             Adult Rehabilitation Note       17 1422 17 0945 17 1530    Rehab Assessment/Intervention    Discipline occupational therapist  -EL physical therapist  -SC physical therapist  -    Document Type therapy note (daily note)  -EL therapy note (daily note)  -SC therapy note (daily note)  -    Subjective Information no complaints;agree to therapy  -EL agree to therapy  -SC agree to therapy;no complaints  -EH    Patient Effort, Rehab Treatment excellent  -EL excellent  -SC excellent  -    Symptoms Noted During/After Treatment none  -EL none  -SC none  -    Precautions/Limitations fall precautions   L adductor nerve catheter; On-Q ball   -EL  fall precautions   adductor nerve catheter  -EH    Recorded by [EL] Iliana Parr, OT [SC] Danny Clark, PT [] Samantha Bello, PT    Pain Assessment    Pain Assessment 0-10  -EL Velazquez-Bae FACES  -SC 0-10  -EH    Velazquez-Bae FACES Pain Rating 0  -EL 2   2  -SC     Pain Score 0  -EL  4  -EH    Post Pain Score 0  -EL  4  -EH    Pain Type  Acute pain  -SC Acute pain  -EH    Pain Location  Knee  -SC Knee  -EH    Pain Orientation  Right  -SC Right;Left  -EH    Pain Intervention(s)  Repositioned  -SC  Ambulation/increased activity;Medication (See MAR);Repositioned  -EH    Response to Interventions   tolerated  -EH    Recorded by [EL] Iliana Parr, OT [SC] Danny Clark, PT [] Samantha Bello, PT    Cognitive Assessment/Intervention    Current Cognitive/Communication Assessment functional  -EL functional  -SC functional  -EH    Orientation Status oriented x 4  -EL oriented x 4  -SC oriented x 4  -EH    Follows Commands/Answers Questions able to follow single-step instructions;100% of the time  -% of the time  -SC able to follow multi-step instructions  -    Personal Safety WNL/WFL  -EL WNL/WFL  -SC good awareness, safety precautions  -EH    Personal Safety Interventions fall prevention program maintained;gait belt;nonskid shoes/slippers when out of bed  -EL gait belt  -SC fall prevention program maintained;gait belt;muscle strengthening facilitated  -EH    Recorded by [EL] Iliana Parr, OT [SC] Danny Clark, PT [EH] Samantha Bello, PT    ROM (Range of Motion)    General ROM Detail  l knee-  -SC lacking 5 degrees from 0 to 90 degrees of flexion on L  -EH    Recorded by  [SC] Danny Clark, PT [EH] Samantha Bello, PT    Bed Mobility, Assessment/Treatment    Bed Mobility, Assistive Device bed rails  -EL  bed rails;head of bed elevated  -    Bed Mob, Supine to Sit, [place] conditional independence  -EL      Bed Mob, Sit to Supine, [place] conditional independence  -EL  supervision required  -    Bed Mobility, Safety Issues decreased use of legs for bridging/pushing  -EL  decreased use of legs for bridging/pushing  -    Bed Mobility, Impairments ROM decreased;strength decreased  -EL  ROM decreased;strength decreased;pain  -    Bed Mobility, Comment good safety awareness, no cues required for bed mobility   -EL uic  -SC     Recorded by [EL] Iliana Parr, OT [SC] Danny Clark, PT [] Samantha Bello, PT    Transfer Assessment/Treatment    Transfers, Sit-Stand  [place] supervision required;verbal cues required  -EL supervision required;verbal cues required  -SC contact guard assist;verbal cues required  -    Transfers, Stand-Sit [place] supervision required;verbal cues required  -EL supervision required;verbal cues required  -SC verbal cues required;contact guard assist  -EH    Transfers, Sit-Stand-Sit, Assist Device rolling walker  -EL rolling walker  -SC rolling walker  -EH    Transfer, Impairments strength decreased  -EL      Transfer, Comment VC for hand placement. Pt advanced L LE forward prior to transfer without cues. Educated on proper body mechanics, sequencing car transfer upon D/C; pt and wife verbalized understanding    - cues for safety given--patient hard of hearing  -SC VC for hand placement, advancement of surgical LE.   -EH    Recorded by [EL] Iliana Parr, OT [SC] Danny Clark, PT [] Samantha Bello, PT    Gait Assessment/Treatment    Gait, [place] Level  supervision required  -SC contact guard assist  -    Gait, Assistive Device  rolling walker  -SC rolling walker  -EH    Gait, Distance (Feet)  400  -  -    Gait, Gait Pattern Analysis  swing-through gait  -SC swing-through gait  -    Gait, Gait Deviations  left:;antalgic  -SC left:;antalgic;weight-shifting ability decreased;right:;toe-to-floor clearance decreased;decreased heel strike;step length decreased;bilateral:;forward flexed posture  -    Gait, Safety Issues   weight-shifting ability decreased  -    Gait, Impairments  impaired balance;strength decreased  -SC pain;ROM decreased;strength decreased  -    Gait, Comment  cues for increasing wt shifting  -SC PT progresses to step through gait pattern. VC for improvement of posture and forward gaze.  -EH    Recorded by  [SC] Danny Clark, PT [EH] Samantha Bello, PT    Stairs Assessment/Treatment    Number of Stairs  1  -SC     Stairs, Handrail Location  none  -SC     Stairs, [place] Level  verbal  cues required;contact guard assist  -SC     Stairs, Assistive Device  walker  -SC     Stairs, Comment  cues for up one step backwards, wife present and assisted  -SC     Recorded by  [SC] Danny Clark, PT     Lower Body Bathing Assessment/Training    LB Bathing Assess/Train, Comment pt and wife verbalized understanding of AE for LB bathing   -      Recorded by [EL] Iliana Parr, OT      Lower Body Dressing Assessment/Training    LB Dressing Assess/Train, Clothing Type doffing:;donning:;shoes  -EL      LB Dressing Assess/Train, Assist Device long-handled shoe horn  -EL      LB Dressing Assess/Train, Position sitting;edge of bed  -EL      LB Dressing Assess/Train, [place] conditional independence  -EL      LB Dressing Assess/Train, Impairments ROM decreased;strength decreased  -EL      LB Dressing Assess/Train, Comment pt donned/ doffed shoes with shoe horn, did not require cues   -EL      Recorded by [EL] Iliana Parr OT      Balance Skills Training    Sitting-Level of Assistance Independent  -      Sitting-Balance Support Feet supported  -      Sitting-Balance Activities Trunk control activities  -EL      Standing-Level of Assistance Close supervision  -      Static Standing Balance Support assistive device  -      Standing-Balance Activities Weight Shift A-P;Weight Shift R-L  -EL      Gait Balance-Level of Assistance  Close supervision  -SC     Gait Balance Support  assistive device  -SC     Recorded by [EL] Iliana Parr, OT [SC] Danny Clark, PT     Therapy Exercises    Exercise Protocols  total knee  -SC total knee  -    Total Knee Exercises  left:;10 reps;completed protocol  -SC left:;10 reps;completed protocol;ankle pumps/circles;quad set;glut set;SLR;SAQ;LAQ;heel slide stretch   heel slide stretch in sitting  -    Recorded by  [SC] Danny Clark, PT [EH] Samantha Bello, PT    Positioning and Restraints    Pre-Treatment Position in bed  -EL sitting in chair/recliner  -SC in  bed  -EH    Post Treatment Position bed  -EL chair  -SC chair  -EH    In Bed notified nsg;call light within reach;sitting EOB;encouraged to call for assist;with family/caregiver  -EL sitting;notified nsg;call light within reach;with family/caregiver  -SC     In Chair   notified nsg;reclined;call light within reach;encouraged to call for assist;with family/caregiver  -EH    Recorded by [EL] Iliana Parr, OT [SC] Danny Clark, PT [EH] Samantha Bello, PT      User Key  (r) = Recorded By, (t) = Taken By, (c) = Cosigned By    Initials Name Effective Dates    SC Danny Clark, PT 06/19/15 -     EH Samantha Bello, PT 06/19/15 -     EL Iliana Parr, OT 06/08/16 -                 OT Goals       02/08/17 1440 02/07/17 1135       Bed Mobility OT LTG    Bed Mobility OT LTG, Date Established  02/07/17  -EL     Bed Mobility OT LTG, Time to Achieve  by discharge  -EL     Bed Mobility OT LTG, Activity Type  supine to sit/sit to supine  -EL     Bed Mobility OT LTG, [place] Level  supervision required;verbal cues required  -EL     Bed Mobility OT LTG, Assist Device  leg   -EL     Bed Mobility OT LTG, Outcome goal met  -EL      Transfer Training OT LTG    Transfer Training OT LTG, Date Established  02/07/17  -EL     Transfer Training OT LTG, Time to Achieve  by discharge  -EL     Transfer Training OT LTG, Activity Type  sit to stand/stand to sit;tub  -EL     Transfer Training OT LTG, [place] Level  contact guard assist;verbal cues required  -EL     Transfer Training OT LTG, Assist Device  walker, rolling;shower chair  -EL     Transfer Training OT LTG, Additional Goal  completed sit to stand with CGA this date  -EL     Transfer Training OT LTG, Outcome goal partially met  -EL goal partially met  -EL     Patient Education OT LTG    Patient Education OT LTG, Date Established  02/07/17  -EL     Patient Education OT LTG, Time to Achieve  by discharge  -EL     Patient Education OT LTG, Education Type   precautions per surgeon;positioning;posture/body mechanics;1 hand/staci technique;home safety;adaptive equipment mgmt;energy conservation;work simplification  -     Patient Education OT LTG, Education Understanding  demonstrates adequately;verbalizes understanding  -     Patient Education OT LTG Outcome goal met  -EL      LB Dressing OT LTG    LB Dressing Goal OT LTG, Date Established  02/07/17  -EL     LB Dressing Goal OT LTG, Time to Achieve  by discharge  -EL     LB Dressing Goal OT LTG, [place] Level  supervision required;verbal cues required  -EL     LB Dressing Goal OT LTG, Adaptive Equipment  reacher;shoe horn, long handled;sock-aid  -EL     LB Dressing Goal OT LTG, Additional Goal demo use of shoe horn without cues 2/8/17  -EL      LB Dressing Goal OT LTG, Outcome goal partially met  -        User Key  (r) = Recorded By, (t) = Taken By, (c) = Cosigned By    Initials Name Provider Type    REGLA Parr, OT Occupational Therapist          Occupational Therapy Education     Title: PT OT SLP Therapies (Done)     Topic: Occupational Therapy (Done)     Point: ADL training (Done)    Description: Instruct learner(s) on proper safety adaptation and remediation techniques during self care or transfers.   Instruct in proper use of assistive devices.    Learning Progress Summary    Learner Readiness Method Response Comment Documented by Status   Patient Acceptance E,D,TB VU,DU Pt demo good understanding of safety precautions, safe body mechanics for transfers, and AE for LB ADL.  02/08/17 1439 Done    Acceptance E VU Educated on role of OT, sx precautions, safety for transfers, home safety upon D/C, and AE for LB ADLs.  02/07/17 1134 Done   Family Acceptance E VU Educated on role of OT, sx precautions, safety for transfers, home safety upon D/C, and AE for LB ADLs.  02/07/17 1134 Done   Significant Other Acceptance E,D,TB VU,DU Pt demo good understanding of safety precautions, safe body mechanics for  transfers, and AE for LB ADL.  02/08/17 1439 Done               Point: Precautions (Done)    Description: Instruct learner(s) on prescribed precautions during self-care and functional transfers.    Learning Progress Summary    Learner Readiness Method Response Comment Documented by Status   Patient Acceptance E,D,TB VU,DU Pt demo good understanding of safety precautions, safe body mechanics for transfers, and AE for LB ADL. EL 02/08/17 1439 Done    Acceptance E VU Educated on role of OT, sx precautions, safety for transfers, home safety upon D/C, and AE for LB ADLs. EL 02/07/17 1134 Done   Family Acceptance E VU Educated on role of OT, sx precautions, safety for transfers, home safety upon D/C, and AE for LB ADLs. EL 02/07/17 1134 Done   Significant Other Acceptance E,D,TB VU,DU Pt demo good understanding of safety precautions, safe body mechanics for transfers, and AE for LB ADL. EL 02/08/17 1439 Done               Point: Body mechanics (Done)    Description: Instruct learner(s) on proper positioning and spine alignment during self-care, functional mobility activities and/or exercises.    Learning Progress Summary    Learner Readiness Method Response Comment Documented by Status   Patient Acceptance E,D,TB VU,DU Pt demo good understanding of safety precautions, safe body mechanics for transfers, and AE for LB ADL.  02/08/17 1439 Done    Acceptance E VU Educated on role of OT, sx precautions, safety for transfers, home safety upon D/C, and AE for LB ADLs. EL 02/07/17 1134 Done   Family Acceptance E VU Educated on role of OT, sx precautions, safety for transfers, home safety upon D/C, and AE for LB ADLs. EL 02/07/17 1134 Done   Significant Other Acceptance E,D,TB VU,DU Pt demo good understanding of safety precautions, safe body mechanics for transfers, and AE for LB ADL. EL 02/08/17 1439 Done                      User Key     Initials Effective Dates Name Provider Type Discipline    EL 06/08/16 -  Iliana Parr,  OT Occupational Therapist OT                  OT Recommendation and Plan  Anticipated Equipment Needs At Discharge: bathing equipment, dressing equipment (issued LB ADL kit)  Anticipated Discharge Disposition: home with assist, home with home health  Planned Therapy Interventions: adaptive equipment training, activity intolerance, ADL retraining, balance training, bed mobility training, transfer training  Therapy Frequency: daily (per priority policy )  Plan of Care Review  Plan Of Care Reviewed With: patient  Progress: progress toward functional goals as expected  Outcome Summary/Follow up Plan: Pt performed bed mobility and transfer to standing with supervision this date. Good safety awareness, pt and wife verbalized understanding of sx precautions and home safety upon D/C.         Outcome Measures       02/08/17 1422 02/08/17 0945 02/07/17 1530    How much help from another person do you currently need...    Turning from your back to your side while in flat bed without using bedrails?  4  -SC 4  -EH    Moving from lying on back to sitting on the side of a flat bed without bedrails?  3  -SC 3  -EH    Moving to and from a bed to a chair (including a wheelchair)?  3  -SC 3  -EH    Standing up from a chair using your arms (e.g., wheelchair, bedside chair)?  3  -SC 3  -EH    Climbing 3-5 steps with a railing?  3  -SC 3  -EH    To walk in hospital room?  3  -SC 3  -EH    AM-PAC 6 Clicks Score  19  -SC 19  -EH    How much help from another is currently needed...    Putting on and taking off regular lower body clothing? 3  -EL      Bathing (including washing, rinsing, and drying) 3  -EL      Toileting (which includes using toilet bed pan or urinal) 3  -EL      Putting on and taking off regular upper body clothing 4  -EL      Taking care of personal grooming (such as brushing teeth) 4  -EL      Eating meals 4  -EL      Score 21  -EL      Functional Assessment    Outcome Measure Options AM-PAC 6 Clicks Daily Activity (OT)   -EL AM-[facility] 6 Clicks Basic Mobility (PT)  -SC AM-[facility] 6 Clicks Basic Mobility (PT)  -      02/07/17 1026 02/07/17 0810       How much help from another person do you currently need...    Turning from your back to your side while in flat bed without using bedrails?  4  -EH     Moving from lying on back to sitting on the side of a flat bed without bedrails?  3  -EH     Moving to and from a bed to a chair (including a wheelchair)?  3  -EH     Standing up from a chair using your arms (e.g., wheelchair, bedside chair)?  3  -EH     Climbing 3-5 steps with a railing?  2  -EH     To walk in hospital room?  3  -EH     AM-PAC 6 Clicks Score  18  -EH     How much help from another is currently needed...    Putting on and taking off regular lower body clothing? 3  -EL      Bathing (including washing, rinsing, and drying) 3  -EL      Toileting (which includes using toilet bed pan or urinal) 3  -EL      Putting on and taking off regular upper body clothing 3  -EL      Taking care of personal grooming (such as brushing teeth) 4  -EL      Eating meals 4  -EL      Score 20  -EL      Functional Assessment    Outcome Measure Options AM-PAC 6 Clicks Daily Activity (OT)  -EL AM-[facility] 6 Clicks Basic Mobility (PT)  -       User Key  (r) = Recorded By, (t) = Taken By, (c) = Cosigned By    Initials Name Provider Type    SC Danny Clark, PT Physical Therapist     Samantha Bello, PT Physical Therapist    EL Iliana Parr, OT Occupational Therapist           Time Calculation:         Time Calculation- OT       02/08/17 1444          Time Calculation- OT    OT Start Time 1422  -EL      Total Timed Code Minutes- OT 11 minute(s)  -EL      OT Received On 02/08/17  -EL      OT Goal Re-Cert Due Date 02/17/17  -EL        User Key  (r) = Recorded By, (t) = Taken By, (c) = Cosigned By    Initials Name Provider Type    REGLA Parr, OT Occupational Therapist           Therapy Charges for Today     Code Description Service Date Service  Provider Modifiers Qty    85677402783 HC OT THERAPEUTIC ACT EA 15 MIN 2/7/2017 Iliana Parr, OT GO 2    55560380570 HC OT EVAL MOD COMPLEXITY 3 2/7/2017 Iliana Parr OT GO 1    53089748424 HC OT THERAPEUTIC ACT EA 15 MIN 2/8/2017 Iliana Parr OT GO 1               Iliana Parr OT  2/8/2017

## 2017-02-08 NOTE — PROGRESS NOTES
[facility]    Acute pain service Inpatient Progress Note    Patient Name: Sandip Feliz  :  1931  MRN:  [id number]        Acute Pain  Service Inpatient Progress Note:    Analgesia:Excellent  Pain Score:1/10  LOC: alert and awake  Resp Status: room air  Cardiac: VS stable  Side Effects:None  Catheter Site:clean  Cath type: peripheral nerve cath(MOOG pump)  Infusion rate: 12ml/hr  Catheter Plan:Catheter to remain Insitu, Continue catheter infusion rate unchanged and Patient to be discharged home  Comments: Patient doing very well, plans to send home today with perpherial nerve catheter and on-q ball.

## 2017-02-08 NOTE — DISCHARGE INSTR - ACTIVITY
Continue activity as instructed by physical therapy.    Continue physical therapy with [facility].

## 2017-02-08 NOTE — PLAN OF CARE
Problem: Patient Care Overview (Adult)  Goal: Plan of Care Review  Outcome: Ongoing (interventions implemented as appropriate)    02/08/17 1440   Coping/Psychosocial Response Interventions   Plan Of Care Reviewed With patient   Patient Care Overview   Progress progress toward functional goals as expected   Outcome Evaluation   Outcome Summary/Follow up Plan Pt performed bed mobility and transfer to standing with supervision this date. Good safety awareness, pt and wife verbalized understanding of sx precautions and home safety upon D/C.          Problem: Knee Replacement, Total (Adult)  Goal: Signs and Symptoms of Listed Potential Problems Will be Absent or Manageable (Knee Replacement, Total)  Outcome: Ongoing (interventions implemented as appropriate)    02/08/17 1440   Knee Replacement, Total   Problems Assessed (Total Knee Replacement) functional decline/self care deficit   Problems Present (Total Knee Replacement) functional decline/self care deficit         Problem: Inpatient Occupational Therapy  Goal: Bed Mobility Goal LTG- OT  Outcome: Outcome(s) achieved Date Met:  02/08/17 02/07/17 1135 02/08/17 1440   Bed Mobility OT LTG   Bed Mobility OT LTG, Date Established 02/07/17 --    Bed Mobility OT LTG, Time to Achieve by discharge --    Bed Mobility OT LTG, Activity Type supine to sit/sit to supine --    Bed Mobility OT LTG, [place] Level supervision required;verbal cues required --    Bed Mobility OT LTG, Assist Device leg  --    Bed Mobility OT LTG, Outcome --  goal met       Goal: Transfer Training Goal 1 LTG- OT  Outcome: Ongoing (interventions implemented as appropriate)    02/07/17 1135 02/08/17 1440   Transfer Training OT LTG   Transfer Training OT LTG, Date Established 02/07/17 --    Transfer Training OT LTG, Time to Achieve by discharge --    Transfer Training OT LTG, Activity Type sit to stand/stand to sit;tub --    Transfer Training OT LTG, [place] Level contact guard assist;verbal  cues required --    Transfer Training OT LTG, Assist Device walker, rolling;shower chair --    Transfer Training OT LTG, Outcome --  goal partially met       Goal: Patient Education Goal LTG- OT  Outcome: Outcome(s) achieved Date Met:  02/08/17 02/07/17 1135 02/08/17 1440   Patient Education OT LTG   Patient Education OT LTG, Date Established 02/07/17 --    Patient Education OT LTG, Time to Achieve by discharge --    Patient Education OT LTG, Education Type precautions per surgeon;positioning;posture/body mechanics;1 hand/staci technique;home safety;adaptive equipment mgmt;energy conservation;work simplification --    Patient Education OT LTG, Education Understanding demonstrates adequately;verbalizes understanding --    Patient Education OT LTG Outcome --  goal met       Goal: LB Dressing Goal LTG- OT  Outcome: Ongoing (interventions implemented as appropriate)    02/07/17 1135 02/08/17 1440   LB Dressing OT LTG   LB Dressing Goal OT LTG, Date Established 02/07/17 --    LB Dressing Goal OT LTG, Time to Achieve by discharge --    LB Dressing Goal OT LTG, [place] Level supervision required;verbal cues required --    LB Dressing Goal OT LTG, Adaptive Equipment reacher;shoe horn, long handled;sock-aid --    LB Dressing Goal OT LTG, Additional Goal --  demo use of shoe horn without cues 2/8/17   LB Dressing Goal OT LTG, Outcome --  goal partially met

## 2017-02-08 NOTE — PLAN OF CARE
Problem: Patient Care Overview (Adult)  Goal: Plan of Care Review  Outcome: Ongoing (interventions implemented as appropriate)    02/08/17 0419   Coping/Psychosocial Response Interventions   Plan Of Care Reviewed With patient   Patient Care Overview   Progress no change   Outcome Evaluation   Outcome Summary/Follow up Plan Patient ambulating well with PT. VSS. Still complains of anterior knee pain, ropivacaine increased. Possibly DC home today.          Problem: Knee Replacement, Total (Adult)  Goal: Signs and Symptoms of Listed Potential Problems Will be Absent or Manageable (Knee Replacement, Total)  Outcome: Ongoing (interventions implemented as appropriate)    Problem: Fall Risk (Adult)  Goal: Identify Related Risk Factors and Signs and Symptoms  Outcome: Ongoing (interventions implemented as appropriate)  Goal: Absence of Falls  Outcome: Ongoing (interventions implemented as appropriate)    Problem: Perioperative Period (Adult)  Goal: Signs and Symptoms of Listed Potential Problems Will be Absent or Manageable (Perioperative Period)  Outcome: Ongoing (interventions implemented as appropriate)

## 2017-02-08 NOTE — DISCHARGE SUMMARY
Patient Name: Sandip Feliz  MRN: [id number]  : 1931  DOS: 2017    Attending: Satinder Avalos MD    Primary Care Provider: Kelly Robison MD    Date of Admission:.2017  1:24 PM    Date of Discharge:  2017    Discharge Diagnosis:  Principal Problem:    S/P left unicompartmental knee replacement  Active Problems:    Arthritis of knee, left    HTN (hypertension)    Osteoporosis    Acute blood loss anemia, mild, asymptomatic    Paroxysmal atrial fibrillation      Hospital Course    Patient is a 85 y.o. male presented for left unicompartmental knee replacement by Dr. Bennett. His knee has been painful for over a year. He has tried injections with minimal pain relief. He does have a history of afib and is followed biannually by Dr. Felipe.    Patient was provided pain medications as needed for pain control, along with adductor canal nerve block infusion of Ropivacaine.    He was seen by PT and OT and has progressed well over his stay.  He used an IS for atelectasis prophylaxis and aspirin along with mechanicals for DVT prophylaxis. He will discontinue ASA and resume Xarelto on Friday this week.  Home medications were resumed as appropriate, and labs were monitored and remained fairly stable.   With the progress he has made, he is ready for DC home today.    A prineo dressing is in place and is to remain for 2 weeks.  He will have an On Q pump ( instructed on it during this admit)  Discussed with patient regarding plan and he shows understanding and agreement.    Patient will have HHPT following discharge.      Procedures Performed  DATE OF PROCEDURE:  2017     PREOPERATIVE DIAGNOSIS: Left knee osteoarthritis.      POSTOPERATIVE DIAGNOSIS: Left knee osteoarthritis.      PROCEDURE PERFORMED: Left knee medial unicompartmental arthroplasty using DePuy Sigma HP cemented femur size #4; cemented tibia all polyethylene size #3, 8 mm thickness.      SURGEON: Richard Bennett MD       Pertinent Test  "Results:    I reviewed the patient's new clinical results.     Results from last 7 days  Lab Units 17  0538   WBC 10*3/mm3 9.97   HEMOGLOBIN g/dL 11.6*   HEMATOCRIT % 34.1*   PLATELETS 10*3/mm3 244       Results from last 7 days  Lab Units 17  0538   SODIUM mmol/L 135   POTASSIUM mmol/L 4.0   CHLORIDE mmol/L 105   TOTAL CO2 mmol/L 24.0   BUN mg/dL 15   CREATININE mg/dL 0.80   CALCIUM mg/dL 9.0   GLUCOSE mg/dL 130*     I reviewed the patient's new imaging including images and reports.      Physical therapy: Pt ROM in L knee lacking 5 degrees from zero with overpressure to 90 degrees of flexion. PT walked in cantrell 300ft with CGA. To attempt stairs tomorrow    Discharge Assessment:    Vital Signs  Visit Vitals   • /59   • Pulse 74   • Temp 98.5 °F (36.9 °C) (Oral)   • Resp 16   • Ht 66\" (167.6 cm)   • Wt 152 lb (68.9 kg)   • SpO2 94%   • BMI 24.53 kg/m2     Temp (24hrs), Av.1 °F (36.7 °C), Min:97.5 °F (36.4 °C), Max:98.5 °F (36.9 °C)      General Appearance:    Alert, cooperative, in no acute distress   Lungs:     Clear to auscultation,respirations regular, even and                   unlabored    Heart:    Regular rhythm and normal rate, normal S1 and S2, no            murmur, no gallop, no rub, no click   Abdomen:     Normal bowel sounds, no masses, no organomegaly, soft        non-tender, non-distended, no guarding, no rebound                 tenderness   Extremities:   Moves all extremities well, no edema, no cyanosis, no              Redness. Left knee Prineo CDI. Nerve block present.   Pulses:   Pulses palpable and equal bilaterally   Skin:   No bleeding, bruising or rash   Neurologic:   Cranial nerves 2 - 12 grossly intact, sensation intact. Flexion and dorsiflexion intact bilateral feet.       Discharge Disposition: Home with HHPT    Discharge Medications   Sandip Feliz   Home Medication Instructions SUSIE:[id number]    Printed on:17 1122   Medication Information                  "     amLODIPine-benazepril (LOTREL 5-20) 5-20 MG per capsule  Take 1 capsule by mouth Daily.             aspirin  MG EC tablet  Take 1 tablet by mouth Daily. Last dose Friday             denosumab (PROLIA) 60 MG/ML solution syringe  Inject 60 mg under the skin every 6 (six) months.             Docusate Sodium (DSS) 100 MG capsule  Take 100 mg by mouth 2 (Two) Times a Day As Needed (constipation).             HYDROcodone-acetaminophen (NORCO) 5-325 MG per tablet  Take 1 tablet by mouth Every 4 (Four) Hours As Needed for moderate pain (4-6) for up to 8 days.             triamterene-hydrochlorothiazide (MAXZIDE-25) 37.5-25 MG per tablet  Take 1 tablet by mouth Daily.             XARELTO 20 MG tablet  Take 0.5 tablets by mouth Daily With Dinner. Resume Friday 2/10/17                 Discharge Diet: Regular diet    [facility] Discharge[facility]    Follow-up Appointments  Dr. Bennett per his orders       PITO Pollard  02/08/17  11:22 AM  Seen and examined by me. Agree with above. Discussed with patient and wife, answered their  questions.   Discharge took over 30 min

## 2017-02-09 NOTE — PROGRESS NOTES
Be    Nerve Cath Post Op Call    Patient Name: Sandip Feliz  :  1931  MRN:  [id number]  Date of Discharge: 2017    Nerve Cath Post Op Call:    Objective wound description: leaking   Catheter Plan:Patient/Family member report nerve catheter previously discontinued, tip intact and Patient called/No answer/Message left to call [facility] pain service for any questions or complaints

## 2017-02-17 ENCOUNTER — DOCUMENTATION (OUTPATIENT)
Dept: ONCOLOGY | Facility: HOSPITAL | Age: 82
End: 2017-02-17

## 2017-02-17 NOTE — PROGRESS NOTES
Survivorship Care Plan    Spoke with pt's wife. Pt is very hard of hearing and is unable to talk on the phone. Plan discussed and all questions answered. SC

## 2017-03-10 ENCOUNTER — OFFICE VISIT (OUTPATIENT)
Dept: CARDIOLOGY | Facility: CLINIC | Age: 82
End: 2017-03-10

## 2017-03-10 VITALS
BODY MASS INDEX: 24.08 KG/M2 | HEIGHT: 66 IN | SYSTOLIC BLOOD PRESSURE: 106 MMHG | WEIGHT: 149.8 LBS | DIASTOLIC BLOOD PRESSURE: 62 MMHG | HEART RATE: 67 BPM

## 2017-03-10 DIAGNOSIS — I48.0 PAROXYSMAL ATRIAL FIBRILLATION (HCC): Primary | ICD-10-CM

## 2017-03-10 DIAGNOSIS — E78.2 MIXED HYPERLIPIDEMIA: ICD-10-CM

## 2017-03-10 DIAGNOSIS — I10 ESSENTIAL HYPERTENSION: ICD-10-CM

## 2017-03-10 PROCEDURE — 99213 OFFICE O/P EST LOW 20 MIN: CPT | Performed by: INTERNAL MEDICINE

## 2017-03-10 RX ORDER — RIVAROXABAN 10 MG/1
TABLET, FILM COATED ORAL DAILY
COMMUNITY
Start: 2017-03-02 | End: 2018-09-11 | Stop reason: HOSPADM

## 2017-03-10 RX ORDER — ACETAMINOPHEN AND CODEINE PHOSPHATE 300; 30 MG/1; MG/1
TABLET ORAL AS NEEDED
COMMUNITY
Start: 2016-12-08 | End: 2017-09-22

## 2017-03-10 RX ORDER — HYDROCODONE BITARTRATE AND ACETAMINOPHEN 5; 325 MG/1; MG/1
TABLET ORAL DAILY
COMMUNITY
Start: 2017-02-22 | End: 2017-09-22

## 2017-03-10 NOTE — PROGRESS NOTES
"Sandip Feliz  8/24/1931  [phone]          PCP: Kelly Robison MD  [address] [address]    IDENTIFICATION: A 85 y.o. male retired from [employer].         Chief Complaint   Patient presents with   • Follow-up            PROBLEM LIST:    1. Atrial fibrillation:    a) CHADS-VASc = 3.  b) Currently rate controlled.    2. Hypertension.    3. Hyponatremia:  a) Present on admission with no previous history.    4. GERD.    5. Osteoporosis.        Chief Complaint   Patient presents with   • Atrial Fibrillation   • Hypertension           Allergies  No Known Allergies    Current Medications    Current Outpatient Prescriptions:   •  acetaminophen-codeine (TYLENOL #3) 300-30 MG per tablet, As Needed., Disp: , Rfl:   •  amLODIPine-benazepril (LOTREL 5-20) 5-20 MG per capsule, Take 1 capsule by mouth Daily., Disp: , Rfl:   •  denosumab (PROLIA) 60 MG/ML solution syringe, Inject 60 mg under the skin every 6 (six) months., Disp: , Rfl:   •  HYDROcodone-acetaminophen (NORCO) 5-325 MG per tablet, Daily., Disp: , Rfl:   •  triamterene-hydrochlorothiazide (MAXZIDE-25) 37.5-25 MG per tablet, Take 1 tablet by mouth Daily., Disp: , Rfl:   •  XARELTO 10 MG tablet, Daily., Disp: , Rfl:     History of Present Illness     Pt denies any chest pain, dyspnea, dyspnea on exertion, orthopnea, PND, palpitations, lower extremity edema.  Some short term nose bleeds without injury.  No recent serologies.  Had knee surgery last month and is \"on the mend\".    ROS:  All systems have been reviewed and are negative with the exception of those mentioned in the HPI.    OBJECTIVE:  Vitals:    03/10/17 1032   BP: 106/62   BP Location: Left arm   Patient Position: Sitting   Pulse: 67   Weight: 149 lb 12.8 oz (67.9 kg)   Height: 66\" (167.6 cm)     Physical Exam   Constitutional: He appears well-developed and well-nourished.   Neck: Normal range of motion. Neck supple. No hepatojugular reflux and no JVD present. Carotid " bruit is not present. No tracheal deviation present. No thyromegaly present.   Cardiovascular: Normal rate, regular rhythm, S1 normal, S2 normal, intact distal pulses and normal pulses.  PMI is not displaced.  Exam reveals no gallop, no distant heart sounds, no friction rub, no midsystolic click and no opening snap.    No murmur heard.  Pulses:       Radial pulses are 2+ on the right side, and 2+ on the left side.        Dorsalis pedis pulses are 2+ on the right side, and 2+ on the left side.        Posterior tibial pulses are 2+ on the right side, and 2+ on the left side.   Pulmonary/Chest: Effort normal and breath sounds normal. He has no wheezes. He has no rales.   Abdominal: Soft. Bowel sounds are normal. He exhibits no mass. There is no tenderness. There is no guarding.       Diagnostic Data:  Procedures      ASSESSMENT:   Diagnosis Plan   1. Paroxysmal atrial fibrillation     2. Essential hypertension     3. Mixed hyperlipidemia         PLAN:  Paf on Xarelto will lower dose to 15.  Maintaining sr .    htn controlled.    Kelly Robison MD, thank you for referring Mr. Feliz for evaluation.  I have forwarded my electronically generated recommendations to you for review.  Please do not hesitate to call with any questions.      Nino Felipe MD, FACC

## 2017-09-22 ENCOUNTER — OFFICE VISIT (OUTPATIENT)
Dept: CARDIOLOGY | Facility: CLINIC | Age: 82
End: 2017-09-22

## 2017-09-22 VITALS
OXYGEN SATURATION: 97 % | SYSTOLIC BLOOD PRESSURE: 118 MMHG | BODY MASS INDEX: 26.43 KG/M2 | DIASTOLIC BLOOD PRESSURE: 64 MMHG | WEIGHT: 154.8 LBS | HEART RATE: 86 BPM | HEIGHT: 64 IN

## 2017-09-22 DIAGNOSIS — I48.0 PAROXYSMAL ATRIAL FIBRILLATION (HCC): Primary | ICD-10-CM

## 2017-09-22 DIAGNOSIS — I10 ESSENTIAL HYPERTENSION: ICD-10-CM

## 2017-09-22 PROCEDURE — 99213 OFFICE O/P EST LOW 20 MIN: CPT | Performed by: INTERNAL MEDICINE

## 2017-09-22 NOTE — PROGRESS NOTES
"Sandip Feliz  8/24/1931  [phone]          PCP: Kelly Robison MD  [address] [address]    IDENTIFICATION: A 86 y.o. male retired from [employer].         Chief Complaint   Patient presents with   • Follow-up            PROBLEM LIST:    1. Atrial fibrillation:    a) CHADS-VASc = 3.  b) Currently rate controlled.    2. Hypertension.    3. Hyponatremia:  a) Present on admission with no previous history.    4. GERD.    5. Osteoporosis.        Chief Complaint   Patient presents with   • Atrial Fibrillation           Allergies  No Known Allergies    Current Medications    Current Outpatient Prescriptions:   •  amLODIPine-benazepril (LOTREL 5-20) 5-20 MG per capsule, Take 1 capsule by mouth Daily., Disp: , Rfl:   •  denosumab (PROLIA) 60 MG/ML solution syringe, Inject 60 mg under the skin every 6 (six) months., Disp: , Rfl:   •  triamterene-hydrochlorothiazide (MAXZIDE-25) 37.5-25 MG per tablet, Take 1 tablet by mouth Daily., Disp: , Rfl:   •  XARELTO 10 MG tablet, Daily., Disp: , Rfl:     History of Present Illness     Pt denies any chest pain, dyspnea, dyspnea on exertion, orthopnea, PND, palpitations, lower extremity edema.  Patient's wife states that he's been feeling well as recommended from his knee surgery and had no further nosebleeds  ROS:  All systems have been reviewed and are negative with the exception of those mentioned in the HPI.    OBJECTIVE:  Vitals:    09/22/17 0907   BP: 118/64   BP Location: Right arm   Patient Position: Sitting   Pulse: 86   SpO2: 97%   Weight: 154 lb 12.8 oz (70.2 kg)   Height: 64\" (162.6 cm)     Physical Exam   Constitutional: He appears well-developed and well-nourished.   Neck: Normal range of motion. Neck supple. No hepatojugular reflux and no JVD present. Carotid bruit is not present. No tracheal deviation present. No thyromegaly present.   Cardiovascular: Normal rate, regular rhythm, S1 normal, S2 normal, intact distal pulses and normal " pulses.  PMI is not displaced.  Exam reveals no gallop, no distant heart sounds, no friction rub, no midsystolic click and no opening snap.    No murmur heard.  Pulses:       Radial pulses are 2+ on the right side, and 2+ on the left side.        Dorsalis pedis pulses are 2+ on the right side, and 2+ on the left side.        Posterior tibial pulses are 2+ on the right side, and 2+ on the left side.   Pulmonary/Chest: Effort normal and breath sounds normal. He has no wheezes. He has no rales.   Abdominal: Soft. Bowel sounds are normal. He exhibits no mass. There is no tenderness. There is no guarding.       Diagnostic Data:  Procedures      ASSESSMENT:   Diagnosis Plan   1. Paroxysmal atrial fibrillation     2. Essential hypertension         PLAN:  Paf on Xarelto will lower dose to 15.  Maintaining sr .    htn controlled.    Kelly Robison MD, thank you for referring Mr. Feliz for evaluation.  I have forwarded my electronically generated recommendations to you for review.  Please do not hesitate to call with any questions.      Nino Felipe MD, FACC

## 2018-03-27 ENCOUNTER — TRANSCRIBE ORDERS (OUTPATIENT)
Dept: ADMINISTRATIVE | Facility: HOSPITAL | Age: 83
End: 2018-03-27

## 2018-03-27 DIAGNOSIS — M81.8 OTHER OSTEOPOROSIS WITHOUT CURRENT PATHOLOGICAL FRACTURE: Primary | ICD-10-CM

## 2018-04-02 ENCOUNTER — INFUSION (OUTPATIENT)
Dept: ONCOLOGY | Facility: HOSPITAL | Age: 83
End: 2018-04-02

## 2018-04-02 VITALS
SYSTOLIC BLOOD PRESSURE: 125 MMHG | HEART RATE: 84 BPM | DIASTOLIC BLOOD PRESSURE: 74 MMHG | RESPIRATION RATE: 18 BRPM | TEMPERATURE: 97.4 F | HEIGHT: 64 IN | WEIGHT: 153 LBS | BODY MASS INDEX: 26.12 KG/M2

## 2018-04-02 DIAGNOSIS — M81.0 OSTEOPOROSIS, UNSPECIFIED OSTEOPOROSIS TYPE, UNSPECIFIED PATHOLOGICAL FRACTURE PRESENCE: Primary | ICD-10-CM

## 2018-04-02 PROCEDURE — 25010000002 DENOSUMAB 60 MG/ML SOLUTION: Performed by: FAMILY MEDICINE

## 2018-04-02 PROCEDURE — 96372 THER/PROPH/DIAG INJ SC/IM: CPT

## 2018-04-02 PROCEDURE — 96401 CHEMO ANTI-NEOPL SQ/IM: CPT

## 2018-04-02 RX ADMIN — DENOSUMAB 60 MG: 60 INJECTION SUBCUTANEOUS at 12:30

## 2018-05-03 ENCOUNTER — HOSPITAL ENCOUNTER (OUTPATIENT)
Dept: BONE DENSITY | Facility: HOSPITAL | Age: 83
Discharge: HOME | End: 2018-05-03
Attending: FAMILY MEDICINE | Admitting: FAMILY MEDICINE

## 2018-05-03 DIAGNOSIS — M81.8 OTHER OSTEOPOROSIS WITHOUT CURRENT PATHOLOGICAL FRACTURE: ICD-10-CM

## 2018-05-03 PROCEDURE — 77080 DXA BONE DENSITY AXIAL: CPT

## 2018-09-06 ENCOUNTER — HOSPITAL ENCOUNTER (INPATIENT)
Facility: HOSPITAL | Age: 83
LOS: 5 days | Discharge: HOME | End: 2018-09-11
Attending: EMERGENCY MEDICINE | Admitting: INTERNAL MEDICINE

## 2018-09-06 DIAGNOSIS — R10.2 SUPRAPUBIC ABDOMINAL PAIN: ICD-10-CM

## 2018-09-06 DIAGNOSIS — R33.8 ACUTE URINARY RETENTION: Primary | ICD-10-CM

## 2018-09-06 DIAGNOSIS — R31.0 GROSS HEMATURIA: ICD-10-CM

## 2018-09-06 PROBLEM — R31.9 HEMATURIA: Status: ACTIVE | Noted: 2018-09-06

## 2018-09-06 PROBLEM — I48.20 CHRONIC ATRIAL FIBRILLATION (HCC): Status: ACTIVE | Noted: 2018-09-06

## 2018-09-06 LAB
ALBUMIN SERPL-MCNC: 4 G/DL (ref 3.2–4.8)
ALBUMIN/GLOB SERPL: 1.6 G/DL (ref 1.5–2.5)
ALP SERPL-CCNC: 74 U/L (ref 25–100)
ALT SERPL W P-5'-P-CCNC: 14 U/L (ref 7–40)
ANION GAP SERPL CALCULATED.3IONS-SCNC: 8 MMOL/L (ref 3–11)
AST SERPL-CCNC: 24 U/L (ref 0–33)
BACTERIA UR QL AUTO: ABNORMAL /HPF
BASOPHILS # BLD AUTO: 0.04 10*3/MM3 (ref 0–0.2)
BASOPHILS NFR BLD AUTO: 0.6 % (ref 0–1)
BILIRUB SERPL-MCNC: 0.3 MG/DL (ref 0.3–1.2)
BILIRUB UR QL STRIP: NEGATIVE
BUN BLD-MCNC: 24 MG/DL (ref 9–23)
BUN/CREAT SERPL: 19.8 (ref 7–25)
CALCIUM SPEC-SCNC: 9.6 MG/DL (ref 8.7–10.4)
CHLORIDE SERPL-SCNC: 106 MMOL/L (ref 99–109)
CLARITY UR: CLEAR
CO2 SERPL-SCNC: 24 MMOL/L (ref 20–31)
COLOR UR: YELLOW
CREAT BLD-MCNC: 1.21 MG/DL (ref 0.6–1.3)
DEPRECATED RDW RBC AUTO: 50.3 FL (ref 37–54)
EOSINOPHIL # BLD AUTO: 0.36 10*3/MM3 (ref 0–0.3)
EOSINOPHIL NFR BLD AUTO: 5 % (ref 0–3)
ERYTHROCYTE [DISTWIDTH] IN BLOOD BY AUTOMATED COUNT: 15.8 % (ref 11.3–14.5)
GFR SERPL CREATININE-BSD FRML MDRD: 57 ML/MIN/1.73
GLOBULIN UR ELPH-MCNC: 2.5 GM/DL
GLUCOSE BLD-MCNC: 95 MG/DL (ref 70–100)
GLUCOSE UR STRIP-MCNC: NEGATIVE MG/DL
HCT VFR BLD AUTO: 34.7 % (ref 38.9–50.9)
HGB BLD-MCNC: 10.8 G/DL (ref 13.1–17.5)
HGB UR QL STRIP.AUTO: ABNORMAL
HYALINE CASTS UR QL AUTO: ABNORMAL /LPF
IMM GRANULOCYTES # BLD: 0 10*3/MM3 (ref 0–0.03)
IMM GRANULOCYTES NFR BLD: 0 % (ref 0–0.6)
KETONES UR QL STRIP: ABNORMAL
LEUKOCYTE ESTERASE UR QL STRIP.AUTO: ABNORMAL
LYMPHOCYTES # BLD AUTO: 1.04 10*3/MM3 (ref 0.6–4.8)
LYMPHOCYTES NFR BLD AUTO: 14.4 % (ref 24–44)
MCH RBC QN AUTO: 26.9 PG (ref 27–31)
MCHC RBC AUTO-ENTMCNC: 31.1 G/DL (ref 32–36)
MCV RBC AUTO: 86.5 FL (ref 80–99)
MONOCYTES # BLD AUTO: 0.68 10*3/MM3 (ref 0–1)
MONOCYTES NFR BLD AUTO: 9.4 % (ref 0–12)
NEUTROPHILS # BLD AUTO: 5.11 10*3/MM3 (ref 1.5–8.3)
NEUTROPHILS NFR BLD AUTO: 70.6 % (ref 41–71)
NITRITE UR QL STRIP: NEGATIVE
PH UR STRIP.AUTO: 6.5 [PH] (ref 5–8)
PLATELET # BLD AUTO: 335 10*3/MM3 (ref 150–450)
PMV BLD AUTO: 9.5 FL (ref 6–12)
POTASSIUM BLD-SCNC: 4.4 MMOL/L (ref 3.5–5.5)
PROT SERPL-MCNC: 6.5 G/DL (ref 5.7–8.2)
PROT UR QL STRIP: ABNORMAL
RBC # BLD AUTO: 4.01 10*6/MM3 (ref 4.2–5.76)
RBC # UR: ABNORMAL /HPF
REF LAB TEST METHOD: ABNORMAL
SODIUM BLD-SCNC: 138 MMOL/L (ref 132–146)
SP GR UR STRIP: 1.02 (ref 1–1.03)
SQUAMOUS #/AREA URNS HPF: ABNORMAL /HPF
UROBILINOGEN UR QL STRIP: ABNORMAL
WBC NRBC COR # BLD: 7.23 10*3/MM3 (ref 3.5–10.8)
WBC UR QL AUTO: ABNORMAL /HPF
YEAST URNS QL MICRO: ABNORMAL /HPF

## 2018-09-06 PROCEDURE — 25010000002 CEFTRIAXONE PER 250 MG: Performed by: EMERGENCY MEDICINE

## 2018-09-06 PROCEDURE — 87086 URINE CULTURE/COLONY COUNT: CPT | Performed by: EMERGENCY MEDICINE

## 2018-09-06 PROCEDURE — 0T9B70Z DRAINAGE OF BLADDER WITH DRAINAGE DEVICE, VIA NATURAL OR ARTIFICIAL OPENING: ICD-10-PCS | Performed by: UROLOGY

## 2018-09-06 PROCEDURE — 25010000002 HYDROMORPHONE PER 4 MG: Performed by: EMERGENCY MEDICINE

## 2018-09-06 PROCEDURE — 99223 1ST HOSP IP/OBS HIGH 75: CPT | Performed by: INTERNAL MEDICINE

## 2018-09-06 PROCEDURE — 25010000002 ONDANSETRON PER 1 MG: Performed by: EMERGENCY MEDICINE

## 2018-09-06 PROCEDURE — 85025 COMPLETE CBC W/AUTO DIFF WBC: CPT | Performed by: EMERGENCY MEDICINE

## 2018-09-06 PROCEDURE — 99285 EMERGENCY DEPT VISIT HI MDM: CPT

## 2018-09-06 PROCEDURE — 84154 ASSAY OF PSA FREE: CPT | Performed by: EMERGENCY MEDICINE

## 2018-09-06 PROCEDURE — 3E1K78Z IRRIGATION OF GENITOURINARY TRACT USING IRRIGATING SUBSTANCE, VIA NATURAL OR ARTIFICIAL OPENING: ICD-10-PCS | Performed by: UROLOGY

## 2018-09-06 PROCEDURE — 80053 COMPREHEN METABOLIC PANEL: CPT | Performed by: EMERGENCY MEDICINE

## 2018-09-06 PROCEDURE — 84153 ASSAY OF PSA TOTAL: CPT | Performed by: EMERGENCY MEDICINE

## 2018-09-06 PROCEDURE — 93010 ELECTROCARDIOGRAM REPORT: CPT | Performed by: INTERNAL MEDICINE

## 2018-09-06 PROCEDURE — 93005 ELECTROCARDIOGRAM TRACING: CPT | Performed by: NURSE PRACTITIONER

## 2018-09-06 PROCEDURE — 81001 URINALYSIS AUTO W/SCOPE: CPT | Performed by: EMERGENCY MEDICINE

## 2018-09-06 RX ORDER — ACETAMINOPHEN 325 MG/1
650 TABLET ORAL ONCE
Status: COMPLETED | OUTPATIENT
Start: 2018-09-06 | End: 2018-09-06

## 2018-09-06 RX ORDER — FINASTERIDE 5 MG/1
5 TABLET, FILM COATED ORAL DAILY
Status: DISCONTINUED | OUTPATIENT
Start: 2018-09-07 | End: 2018-09-11 | Stop reason: HOSPADM

## 2018-09-06 RX ORDER — NITROFURANTOIN 25; 75 MG/1; MG/1
100 CAPSULE ORAL 2 TIMES DAILY
Qty: 14 CAPSULE | Refills: 0 | Status: SHIPPED | OUTPATIENT
Start: 2018-09-06 | End: 2018-09-11 | Stop reason: HOSPADM

## 2018-09-06 RX ORDER — SODIUM CHLORIDE 0.9 % (FLUSH) 0.9 %
10 SYRINGE (ML) INJECTION AS NEEDED
Status: DISCONTINUED | OUTPATIENT
Start: 2018-09-06 | End: 2018-09-11 | Stop reason: HOSPADM

## 2018-09-06 RX ORDER — HYDROMORPHONE HYDROCHLORIDE 1 MG/ML
0.25 INJECTION, SOLUTION INTRAMUSCULAR; INTRAVENOUS; SUBCUTANEOUS ONCE
Status: COMPLETED | OUTPATIENT
Start: 2018-09-06 | End: 2018-09-06

## 2018-09-06 RX ORDER — TAMSULOSIN HYDROCHLORIDE 0.4 MG/1
0.4 CAPSULE ORAL DAILY
Status: DISCONTINUED | OUTPATIENT
Start: 2018-09-07 | End: 2018-09-11 | Stop reason: HOSPADM

## 2018-09-06 RX ORDER — ONDANSETRON 2 MG/ML
4 INJECTION INTRAMUSCULAR; INTRAVENOUS ONCE
Status: COMPLETED | OUTPATIENT
Start: 2018-09-06 | End: 2018-09-06

## 2018-09-06 RX ORDER — CEFTRIAXONE SODIUM 1 G/50ML
1 INJECTION, SOLUTION INTRAVENOUS EVERY 24 HOURS
Status: DISCONTINUED | OUTPATIENT
Start: 2018-09-06 | End: 2018-09-06 | Stop reason: SDUPTHER

## 2018-09-06 RX ORDER — TRIAMTERENE AND HYDROCHLOROTHIAZIDE 37.5; 25 MG/1; MG/1
1 TABLET ORAL DAILY
Status: DISCONTINUED | OUTPATIENT
Start: 2018-09-07 | End: 2018-09-11 | Stop reason: HOSPADM

## 2018-09-06 RX ORDER — SODIUM CHLORIDE 9 MG/ML
100 INJECTION, SOLUTION INTRAVENOUS CONTINUOUS
Status: DISCONTINUED | OUTPATIENT
Start: 2018-09-06 | End: 2018-09-11 | Stop reason: HOSPADM

## 2018-09-06 RX ORDER — CEFTRIAXONE SODIUM 1 G/50ML
1 INJECTION, SOLUTION INTRAVENOUS ONCE
Status: COMPLETED | OUTPATIENT
Start: 2018-09-06 | End: 2018-09-06

## 2018-09-06 RX ORDER — CEFTRIAXONE SODIUM 1 G/50ML
1 INJECTION, SOLUTION INTRAVENOUS EVERY 24 HOURS
Status: DISCONTINUED | OUTPATIENT
Start: 2018-09-07 | End: 2018-09-11 | Stop reason: HOSPADM

## 2018-09-06 RX ORDER — ACETAMINOPHEN 325 MG/1
650 TABLET ORAL EVERY 4 HOURS PRN
Status: DISCONTINUED | OUTPATIENT
Start: 2018-09-06 | End: 2018-09-11 | Stop reason: HOSPADM

## 2018-09-06 RX ADMIN — ONDANSETRON 4 MG: 2 INJECTION INTRAMUSCULAR; INTRAVENOUS at 20:52

## 2018-09-06 RX ADMIN — ACETAMINOPHEN 650 MG: 325 TABLET ORAL at 15:35

## 2018-09-06 RX ADMIN — HYDROMORPHONE HYDROCHLORIDE 0.25 MG: 1 INJECTION, SOLUTION INTRAMUSCULAR; INTRAVENOUS; SUBCUTANEOUS at 20:53

## 2018-09-06 RX ADMIN — LIDOCAINE HYDROCHLORIDE 5 ML: 20 JELLY TOPICAL at 13:32

## 2018-09-06 RX ADMIN — LIDOCAINE HYDROCHLORIDE: 20 JELLY TOPICAL at 21:55

## 2018-09-06 RX ADMIN — SODIUM CHLORIDE 100 ML/HR: 9 INJECTION, SOLUTION INTRAVENOUS at 23:22

## 2018-09-06 RX ADMIN — LIDOCAINE HYDROCHLORIDE: 20 JELLY TOPICAL at 19:00

## 2018-09-06 RX ADMIN — SODIUM CHLORIDE 1000 ML: 9 INJECTION, SOLUTION INTRAVENOUS at 15:35

## 2018-09-06 RX ADMIN — CEFTRIAXONE SODIUM 1 G: 1 INJECTION, SOLUTION INTRAVENOUS at 18:06

## 2018-09-06 NOTE — ED PROVIDER NOTES
Subjective   Sandip Feliz is a 87 y.o.male who presents to the ED with c/o urinary retention and back pain with onset one month ago. The wife at bedside reports that the patient has had kidney problems over the past 3-4 weeks. She reports he has back pain and a feeling of urinary frequency, but he has had reduced urination each time. The wife reports he recently had an MRI of his back, which revealed an enlarged kidney. The patient saw his PCP, Dr. Robison, earlier today due to his presenting symptoms. The wife reports Dr. Robison did blood work, found his prostate was slightly swollen, and referred him to the ED to have a catheter placed. He currently complains of abdominal distension in the suprapubic region and BLE swelling, with onset a few weeks ago. However, he denies any constipation, fever, chills, nausea, vomiting, or any other acute complaints at this time. The wife reports he has an appointment with Dr. Stevens, Urologist, tomorrow.        History provided by:  Spouse and patient  Difficulty Urinating   Relieved by:  None tried  Worsened by:  Nothing  Ineffective treatments:  None tried  Associated symptoms: urinary frequency and urinary retention    Associated symptoms: no fever, no nausea and no vomiting        Review of Systems   Constitutional: Negative for chills and fever.   Cardiovascular: Positive for leg swelling (bilateral).   Gastrointestinal: Positive for abdominal distention (suprapubic region). Negative for constipation, nausea and vomiting.   Genitourinary: Positive for decreased urine volume, difficulty urinating and frequency.   Musculoskeletal: Positive for back pain.   All other systems reviewed and are negative.      Past Medical History:   Diagnosis Date   • A-fib (CMS/[facility])    • Arthritis    • Hearing impairment    • Hypertension    • Melanoma (CMS/[facility])    • Wears dentures    • Wears glasses    • Wears hearing aid        No Known Allergies    Past Surgical History:   Procedure Laterality  Date   • APPENDECTOMY     • CATARACT EXTRACTION     • KNEE ARTHROPLASTY UNICOMPARTMENTAL Left 2/6/2017    Procedure: UNICOMPARTMENTAL ARTHROPLASTY LEFT knee;  Surgeon: Richard Bennett MD;  Location: [facility];  Service:    • SKIN CANCER EXCISION      MELANOMA   • STOMACH SURGERY         History reviewed. No pertinent family history.    Social History     Social History   • Marital status:      Social History Main Topics   • Smoking status: Former Smoker     Types: Pipe     Quit date: 1970   • Smokeless tobacco: Never Used   • Alcohol use 1.2 - 2.4 oz/week     1 - 2 Glasses of wine, 1 - 2 Cans of beer per week      Comment: NIGHTLY   • Drug use: No   • Sexual activity: Defer     Other Topics Concern   • Not on file         Objective   Physical Exam   Constitutional: He is oriented to person, place, and time. He appears well-developed and well-nourished. No distress.   Appear in no acute distress   HENT:   Head: Normocephalic and atraumatic.   Nose: Nose normal.   Eyes: Conjunctivae are normal. No scleral icterus.   Neck: Normal range of motion. Neck supple.   Cardiovascular: Normal rate, regular rhythm and normal heart sounds.    No murmur heard.  Pulmonary/Chest: Effort normal and breath sounds normal. No respiratory distress.   Abdominal: Soft. Bowel sounds are normal. There is tenderness.   Mild suprapubic tenderness with some suprapubic distension.    Genitourinary:   Genitourinary Comments: Shaffer catheter has just been placed. Urine is draining and appear mildly bloody   Musculoskeletal: Normal range of motion. He exhibits no tenderness.   Neurological: He is alert and oriented to person, place, and time.   Skin: Skin is warm and dry.   Psychiatric: He has a normal mood and affect. His behavior is normal.   Nursing note and vitals reviewed.      Procedures         ED Course  ED Course as of Sep 06 2208   Thu Sep 06, 2018   1548 Dr. Hancock is reevaluating the patient at bedside. There is bright red blood  in his catheter at this time. Dr. Hancock is discussing findings and plans to administer more fluids prior to discharge with the patient and his wife. All are agreeable  [CT]   1636 Dr. Hancock paged Dr. Stevens  [CT]   1640 Dr. Hancock spoke with Dr. Stevens, who recommends placing a 3-way christianson catheter, bladder irrigation, and admitting the patient for further evaluation  [CT]   1641 Dr. Hancock is reevaluating the patient at bedside. He is discussing new plans for christianson catheter placement and possible admission. All are agreeable  [CT]   1942 Nurses removed the coude catheter, but were unable to replace it with a 3-way irrigating christianson. Patient has had increased pain and bleeding. Dr. Hancock paged Dr. Stevens for further treatment  [CT]   2019 Dr. Hancock spoke with Dr. Stevens. While the patient has an appointment with him, Dr. Stevens has not yet seen patient. He is not able to assist in care of this patient tonight as he is not on service call.  Dr. Hancock has now paged on call urology, Dr. Rausch  [MS]   2104 I spoke with Dr. Rausch who will come to the emergency department to assist in care of this patient.  [MS]   2104 I have paged Dr. Murray, hospitalist, for admission of this patient.  [MS]   2155 Dr. Rausch has placed 3 way Christianson and begun bladder irrigation.  Admission to Dr. Chan, hospitalist.  [MS]      ED Course User Index  [CT] Juan Antonio Cr  [MS] Edson Hancock MD      Recent Results (from the past 24 hour(s))   Comprehensive Metabolic Panel    Collection Time: 09/06/18  2:09 PM   Result Value Ref Range    Glucose 95 70 - 100 mg/dL    BUN 24 (H) 9 - 23 mg/dL    Creatinine 1.21 0.60 - 1.30 mg/dL    Sodium 138 132 - 146 mmol/L    Potassium 4.4 3.5 - 5.5 mmol/L    Chloride 106 99 - 109 mmol/L    CO2 24.0 20.0 - 31.0 mmol/L    Calcium 9.6 8.7 - 10.4 mg/dL    Total Protein 6.5 5.7 - 8.2 g/dL    Albumin 4.00 3.20 - 4.80 g/dL    ALT (SGPT) 14 7 - 40 U/L    AST (SGOT) 24 0 - 33 U/L    Alkaline  Phosphatase 74 25 - 100 U/L    Total Bilirubin 0.3 0.3 - 1.2 mg/dL    eGFR Non African Amer 57 (L) >60 mL/min/1.73    Globulin 2.5 gm/dL    A/G Ratio 1.6 1.5 - 2.5 g/dL    BUN/Creatinine Ratio 19.8 7.0 - 25.0    Anion Gap 8.0 3.0 - 11.0 mmol/L   CBC Auto Differential    Collection Time: 09/06/18  2:09 PM   Result Value Ref Range    WBC 7.23 3.50 - 10.80 10*3/mm3    RBC 4.01 (L) 4.20 - 5.76 10*6/mm3    Hemoglobin 10.8 (L) 13.1 - 17.5 g/dL    Hematocrit 34.7 (L) 38.9 - 50.9 %    MCV 86.5 80.0 - 99.0 fL    MCH 26.9 (L) 27.0 - 31.0 pg    MCHC 31.1 (L) 32.0 - 36.0 g/dL    RDW 15.8 (H) 11.3 - 14.5 %    RDW-SD 50.3 37.0 - 54.0 fl    MPV 9.5 6.0 - 12.0 fL    Platelets 335 150 - 450 10*3/mm3    Neutrophil % 70.6 41.0 - 71.0 %    Lymphocyte % 14.4 (L) 24.0 - 44.0 %    Monocyte % 9.4 0.0 - 12.0 %    Eosinophil % 5.0 (H) 0.0 - 3.0 %    Basophil % 0.6 0.0 - 1.0 %    Immature Grans % 0.0 0.0 - 0.6 %    Neutrophils, Absolute 5.11 1.50 - 8.30 10*3/mm3    Lymphocytes, Absolute 1.04 0.60 - 4.80 10*3/mm3    Monocytes, Absolute 0.68 0.00 - 1.00 10*3/mm3    Eosinophils, Absolute 0.36 (H) 0.00 - 0.30 10*3/mm3    Basophils, Absolute 0.04 0.00 - 0.20 10*3/mm3    Immature Grans, Absolute 0.00 0.00 - 0.03 10*3/mm3   Urinalysis With Microscopic If Indicated (No Culture) - Urine, Catheter    Collection Time: 09/06/18  2:56 PM   Result Value Ref Range    Color, UA Yellow Yellow, Straw    Appearance, UA Clear Clear    pH, UA 6.5 5.0 - 8.0    Specific Gravity, UA 1.020 1.001 - 1.030    Glucose, UA Negative Negative    Ketones, UA Trace (A) Negative    Bilirubin, UA Negative Negative    Blood, UA Moderate (2+) (A) Negative    Protein, UA Trace (A) Negative    Leuk Esterase, UA Small (1+) (A) Negative    Nitrite, UA Negative Negative    Urobilinogen, UA 1.0 E.U./dL 0.2 - 1.0 E.U./dL   Urinalysis, Microscopic Only - Urine, Clean Catch    Collection Time: 09/06/18  2:56 PM   Result Value Ref Range    RBC, UA Too Numerous to Count (A) None Seen, 0-2  /HPF    WBC, UA 13-20 (A) None Seen, 0-2 /HPF    Bacteria, UA 2+ (A) None Seen, Trace /HPF    Squamous Epithelial Cells, UA 3-6 (A) None Seen, 0-2 /HPF    Yeast, UA Small/1+ Budding Yeast None Seen /HPF    Hyaline Casts, UA 0-6 0 - 6 /LPF    Methodology Manual Light Microscopy      Note: In addition to lab results from this visit, the labs listed above may include labs taken at another facility or during a different encounter within the last 24 hours. Please correlate lab times with ED admission and discharge times for further clarification of the services performed during this visit.    No orders to display     Vitals:    09/06/18 2100 09/06/18 2115 09/06/18 2145 09/06/18 2200   BP: 158/89 131/82 167/90 154/82   Pulse:    90   Resp:    18   Temp:       TempSrc:       SpO2: (!) 83% (!) 80% 96% 94%   Weight:       Height:         Medications   sodium chloride 0.9 % flush 10 mL (not administered)   lidocaine (UROJET) jelly ( Urethral Given 9/6/18 2155)   cefTRIAXone (ROCEPHIN) IVPB 1 g (not administered)   finasteride (PROSCAR) tablet 5 mg (not administered)   tamsulosin (FLOMAX) 24 hr capsule 0.4 mg (not administered)   lidocaine (XYLOCAINE) 2% jelly (5 mL Topical Given 9/6/18 1332)   sodium chloride 0.9 % bolus 1,000 mL (0 mL Intravenous Stopped 9/6/18 1724)   acetaminophen (TYLENOL) tablet 650 mg (650 mg Oral Given 9/6/18 1535)   cefTRIAXone (ROCEPHIN) IVPB 1 g (0 g Intravenous Stopped 9/6/18 1918)   lidocaine (XYLOCAINE) 2% jelly ( Urethral Given 9/6/18 1900)   HYDROmorphone (DILAUDID) injection 0.25 mg (0.25 mg Intravenous Given 9/6/18 2053)   ondansetron (ZOFRAN) injection 4 mg (4 mg Intravenous Given 9/6/18 2052)     ECG/EMG Results (last 24 hours)     ** No results found for the last 24 hours. **                          MDM    Final diagnoses:   Acute urinary retention   Suprapubic abdominal pain   Gross hematuria       Documentation assistance provided by scribe Juan Antonio S Tancula.  Information recorded by  the scribe was done at my direction and has been verified and validated by me.     Juan Antonio Cr  09/06/18 1356       Juan Antonio Cr  09/06/18 1432       Edson Hancock MD  09/06/18 [id number]

## 2018-09-06 NOTE — DISCHARGE INSTRUCTIONS
Please let Dr. Stevens know that you retained a large volume of urine in your bladder.  Also that we started you on Macrobid or a possible but not definite urinary tract infection.    Return to the emergency department if worse or if fevers develop.    Please review the medications you are supposed to be taking according to prior physician recommendations. I have not changed your home medications during this visit. If your discharge instructions indicate that I have changed your home medications, this is not the case, and you should disregard. If you have any questions about the medication you should be taking at home, please call your physician.

## 2018-09-07 ENCOUNTER — EPISODE CHANGES (OUTPATIENT)
Dept: CASE MANAGEMENT | Facility: OTHER | Age: 83
End: 2018-09-07

## 2018-09-07 ENCOUNTER — APPOINTMENT (OUTPATIENT)
Dept: CT IMAGING | Facility: HOSPITAL | Age: 83
End: 2018-09-07

## 2018-09-07 LAB
ANION GAP SERPL CALCULATED.3IONS-SCNC: 8 MMOL/L (ref 3–11)
BASOPHILS # BLD AUTO: 0.03 10*3/MM3 (ref 0–0.2)
BASOPHILS NFR BLD AUTO: 0.3 % (ref 0–1)
BUN BLD-MCNC: 18 MG/DL (ref 9–23)
BUN/CREAT SERPL: 20.5 (ref 7–25)
CALCIUM SPEC-SCNC: 8.5 MG/DL (ref 8.7–10.4)
CHLORIDE SERPL-SCNC: 109 MMOL/L (ref 99–109)
CO2 SERPL-SCNC: 23 MMOL/L (ref 20–31)
CREAT BLD-MCNC: 0.88 MG/DL (ref 0.6–1.3)
DEPRECATED RDW RBC AUTO: 49.4 FL (ref 37–54)
EOSINOPHIL # BLD AUTO: 0.19 10*3/MM3 (ref 0–0.3)
EOSINOPHIL NFR BLD AUTO: 2 % (ref 0–3)
ERYTHROCYTE [DISTWIDTH] IN BLOOD BY AUTOMATED COUNT: 15.5 % (ref 11.3–14.5)
GFR SERPL CREATININE-BSD FRML MDRD: 82 ML/MIN/1.73
GLUCOSE BLD-MCNC: 111 MG/DL (ref 70–100)
HCT VFR BLD AUTO: 32.6 % (ref 38.9–50.9)
HGB BLD-MCNC: 10.2 G/DL (ref 13.1–17.5)
IMM GRANULOCYTES # BLD: 0.01 10*3/MM3 (ref 0–0.03)
IMM GRANULOCYTES NFR BLD: 0.1 % (ref 0–0.6)
LYMPHOCYTES # BLD AUTO: 0.88 10*3/MM3 (ref 0.6–4.8)
LYMPHOCYTES NFR BLD AUTO: 9.4 % (ref 24–44)
MCH RBC QN AUTO: 26.9 PG (ref 27–31)
MCHC RBC AUTO-ENTMCNC: 31.3 G/DL (ref 32–36)
MCV RBC AUTO: 86 FL (ref 80–99)
MONOCYTES # BLD AUTO: 0.8 10*3/MM3 (ref 0–1)
MONOCYTES NFR BLD AUTO: 8.5 % (ref 0–12)
NEUTROPHILS # BLD AUTO: 7.49 10*3/MM3 (ref 1.5–8.3)
NEUTROPHILS NFR BLD AUTO: 79.7 % (ref 41–71)
PLATELET # BLD AUTO: 279 10*3/MM3 (ref 150–450)
PMV BLD AUTO: 9.4 FL (ref 6–12)
POTASSIUM BLD-SCNC: 3.4 MMOL/L (ref 3.5–5.5)
PSA FREE MFR SERPL: 15.2 %
PSA FREE SERPL-MCNC: 1.17 NG/ML
PSA SERPL-MCNC: 7.7 NG/ML (ref 0–4)
RBC # BLD AUTO: 3.79 10*6/MM3 (ref 4.2–5.76)
SODIUM BLD-SCNC: 140 MMOL/L (ref 132–146)
WBC NRBC COR # BLD: 9.4 10*3/MM3 (ref 3.5–10.8)

## 2018-09-07 PROCEDURE — 0 IOPAMIDOL PER 1 ML: Performed by: INTERNAL MEDICINE

## 2018-09-07 PROCEDURE — 74178 CT ABD&PLV WO CNTR FLWD CNTR: CPT

## 2018-09-07 PROCEDURE — 99232 SBSQ HOSP IP/OBS MODERATE 35: CPT | Performed by: INTERNAL MEDICINE

## 2018-09-07 PROCEDURE — 25010000002 CEFTRIAXONE PER 250 MG: Performed by: UROLOGY

## 2018-09-07 PROCEDURE — 85025 COMPLETE CBC W/AUTO DIFF WBC: CPT | Performed by: NURSE PRACTITIONER

## 2018-09-07 PROCEDURE — 80048 BASIC METABOLIC PNL TOTAL CA: CPT | Performed by: NURSE PRACTITIONER

## 2018-09-07 RX ORDER — HYDROCODONE BITARTRATE AND ACETAMINOPHEN 5; 325 MG/1; MG/1
1 TABLET ORAL ONCE AS NEEDED
Status: COMPLETED | OUTPATIENT
Start: 2018-09-07 | End: 2018-09-07

## 2018-09-07 RX ADMIN — SODIUM CHLORIDE 100 ML/HR: 9 INJECTION, SOLUTION INTRAVENOUS at 18:55

## 2018-09-07 RX ADMIN — SODIUM CHLORIDE 100 ML/HR: 9 INJECTION, SOLUTION INTRAVENOUS at 11:38

## 2018-09-07 RX ADMIN — FINASTERIDE 5 MG: 5 TABLET, FILM COATED ORAL at 09:38

## 2018-09-07 RX ADMIN — CEFTRIAXONE SODIUM 1 G: 1 INJECTION, SOLUTION INTRAVENOUS at 17:09

## 2018-09-07 RX ADMIN — TRIAMTERENE AND HYDROCHLOROTHIAZIDE 1 TABLET: 37.5; 25 TABLET ORAL at 09:38

## 2018-09-07 RX ADMIN — IOPAMIDOL 95 ML: 755 INJECTION, SOLUTION INTRAVENOUS at 18:16

## 2018-09-07 RX ADMIN — LISINOPRIL: 20 TABLET ORAL at 09:38

## 2018-09-07 RX ADMIN — TAMSULOSIN HYDROCHLORIDE 0.4 MG: 0.4 CAPSULE ORAL at 09:38

## 2018-09-07 RX ADMIN — ACETAMINOPHEN 650 MG: 325 TABLET ORAL at 21:26

## 2018-09-07 RX ADMIN — ACETAMINOPHEN 650 MG: 325 TABLET ORAL at 02:19

## 2018-09-07 RX ADMIN — HYDROCODONE BITARTRATE AND ACETAMINOPHEN 1 TABLET: 5; 325 TABLET ORAL at 05:13

## 2018-09-07 NOTE — CONSULTS
Consult    Patient Name: Sandip Feliz  Medical Record Number: [id number]  YOB: 1931    Date of consultation: 9/6/2018    Referring Provider:No ref. provider found  Reason for Consultation: Urinary retention and hematuria    Patient Care Team:  Kelly Robison MD as PCP - General (Family Medicine)  Kelly Robison MD as PCP - Claims Attributed    Chief complaint   Chief Complaint   Patient presents with   • Back Pain   • Urinary Retention       Subjective .     History of present illness:    Mr. Feilz is a pleasant 87-year-old gentleman who was seeing his primary care physician earlier today with hesitancy of urination for the past 2-3 weeks.  CT scan of the abdomen was performed showing hyperdistention of the bladder and he was asked to come to the emergency department for bladder decompression.  In the emergency department us small catheter was placed into the urinary bladder and the bladder was emptied however he developed significant hematuria and the small catheter was no longer draining.  Attempts at three-way Shaffer catheter placement made however were unsuccessful.  Patient did have a lumbar spine MRI earlier today which is reviewed.  The left kidney has 2 masses on it that are not clearly cysts otherwise the kidneys are obscured due to the nature of the MRI    Patient denies any history of hematuria.  He is on anticoagulants with the relative secondary to atrial fibrillation.  He does not have significant urologic history.  He does have 3 brothers with prostate cancer.  He does not have a personal history of prostate cancer.  He has not had PSA screening since his 80th birthday.  He denies a history of kidney stones.  He denies a history of prostatitis and UTI.  He is never had prostate surgery and denies use of BPH medications.    Past Medical History:   Diagnosis Date   • A-fib (CMS/HCC)    • Arthritis    • Hearing impairment    • Hypertension    • Melanoma (CMS/HCC)    •  "Wears dentures    • Wears glasses    • Wears hearing aid      Past Surgical History:   Procedure Laterality Date   • APPENDECTOMY     • CATARACT EXTRACTION     • KNEE ARTHROPLASTY UNICOMPARTMENTAL Left 2/6/2017    Procedure: UNICOMPARTMENTAL ARTHROPLASTY LEFT knee;  Surgeon: Richard Bennett MD;  Location: [facility];  Service:    • SKIN CANCER EXCISION      MELANOMA   • STOMACH SURGERY       History reviewed. No pertinent family history.  Social History   Substance Use Topics   • Smoking status: Former Smoker     Types: Pipe     Quit date: 1970   • Smokeless tobacco: Never Used   • Alcohol use 1.2 - 2.4 oz/week     1 - 2 Glasses of wine, 1 - 2 Cans of beer per week      Comment: NIGHTLY       (Not in a hospital admission)  Allergies:  Patient has no known allergies.    Review of Systems  The following systems were reviewed and negative;  constitution, respiratory, cardiovascular, gastrointestinal and genitourinary    Objective     Vital Signs   /83   Pulse 81   Temp 98.2 °F (36.8 °C) (Oral)   Resp 16   Ht 165.1 cm (65\")   Wt 68.9 kg (152 lb)   SpO2 92%   BMI 25.29 kg/m²     Physical Exam:  General Appearance: No apparent distress  Back: No No kyphosis present, no scoliosis present,no tenderness to percussion or Palpation  Lungs: Respirations regular, even and  unlabored  Heart: Regular rhythm and normal rate  Chest Wall: No abnormalities observed  Abdomen: No costovertebral angle tenderness, bladder is palpable  Genital: Uncircumcised phallus with normal scrotal exam  Rectal: Normal anal tone with smooth a nodular 80-90 g prostate nontender  Extremities: Moves all extremities well, no edema no cyanosis, no redness  Pulses: Pulses palpable  Skin: No bleeding, bruising or rash  Lymph nodes: No palpable adenopathy  Neurologic: Neurologically grossly intact    Results Review:  Lab Results (last 24 hours)     Procedure Component Value Units Date/Time    Urine Culture - Urine, Urine, Clean Catch [[id number]] " Collected:  09/06/18 1641    Specimen:  Urine from Urine, Catheter Updated:  09/06/18 2023    Urinalysis With Microscopic If Indicated (No Culture) - Urine, Catheter [[id number]]  (Abnormal) Collected:  09/06/18 1456    Specimen:  Urine from Urine, Catheter Updated:  09/06/18 1526     Color, UA Yellow     Appearance, UA Clear     pH, UA 6.5     Specific Gravity, UA 1.020     Glucose, UA Negative     Ketones, UA Trace (A)     Bilirubin, UA Negative     Blood, UA Moderate (2+) (A)     Protein, UA Trace (A)     Leuk Esterase, UA Small (1+) (A)     Nitrite, UA Negative     Urobilinogen, UA 1.0 E.U./dL    Urinalysis, Microscopic Only - Urine, Clean Catch [[id number]]  (Abnormal) Collected:  09/06/18 1456    Specimen:  Urine from Urine, Catheter Updated:  09/06/18 1526     RBC, UA Too Numerous to Count (A) /HPF      WBC, UA 13-20 (A) /HPF      Bacteria, UA 2+ (A) /HPF      Squamous Epithelial Cells, UA 3-6 (A) /HPF      Yeast, UA Small/1+ Budding Yeast /HPF      Hyaline Casts, UA 0-6 /LPF      Methodology Manual Light Microscopy    Comprehensive Metabolic Panel [[id number]]  (Abnormal) Collected:  09/06/18 1409    Specimen:  Blood Updated:  09/06/18 1439     Glucose 95 mg/dL      BUN 24 (H) mg/dL      Creatinine 1.21 mg/dL      Sodium 138 mmol/L      Potassium 4.4 mmol/L      Chloride 106 mmol/L      CO2 24.0 mmol/L      Calcium 9.6 mg/dL      Total Protein 6.5 g/dL      Albumin 4.00 g/dL      ALT (SGPT) 14 U/L      AST (SGOT) 24 U/L      Alkaline Phosphatase 74 U/L      Total Bilirubin 0.3 mg/dL      eGFR Non African Amer 57 (L) mL/min/1.73      Globulin 2.5 gm/dL      A/G Ratio 1.6 g/dL      BUN/Creatinine Ratio 19.8     Anion Gap 8.0 mmol/L     Narrative:       National [employer] Guidelines    Stage     Description        GFR  1         Normal or High     90+  2         Mild decrease      60-89  3         Moderate decrease  30-59  4         Severe decrease    15-29  5         Kidney failure     <15    The MDRD GFR  formula is only valid for adults with stable renal function between ages 18 and 70.    CBC & Differential [[id number]] Collected:  09/06/18 1409    Specimen:  Blood Updated:  09/06/18 1429    Narrative:       The following orders were created for panel order CBC & Differential.  Procedure                               Abnormality         Status                     ---------                               -----------         ------                     CBC Auto Differential[[id number]]         Abnormal            Final result                 Please view results for these tests on the individual orders.    CBC Auto Differential [[id number]]  (Abnormal) Collected:  09/06/18 1409    Specimen:  Blood Updated:  09/06/18 1429     WBC 7.23 10*3/mm3      RBC 4.01 (L) 10*6/mm3      Hemoglobin 10.8 (L) g/dL      Hematocrit 34.7 (L) %      MCV 86.5 fL      MCH 26.9 (L) pg      MCHC 31.1 (L) g/dL      RDW 15.8 (H) %      RDW-SD 50.3 fl      MPV 9.5 fL      Platelets 335 10*3/mm3      Neutrophil % 70.6 %      Lymphocyte % 14.4 (L) %      Monocyte % 9.4 %      Eosinophil % 5.0 (H) %      Basophil % 0.6 %      Immature Grans % 0.0 %      Neutrophils, Absolute 5.11 10*3/mm3      Lymphocytes, Absolute 1.04 10*3/mm3      Monocytes, Absolute 0.68 10*3/mm3      Eosinophils, Absolute 0.36 (H) 10*3/mm3      Basophils, Absolute 0.04 10*3/mm3      Immature Grans, Absolute 0.00 10*3/mm3     PSA, Total & Free [[id number]] Collected:  09/06/18 1409    Specimen:  Blood Updated:  09/06/18 1414        Imaging Results (last 72 hours)     ** No results found for the last 72 hours. **           I reviewed the patient's new clinical results.      Assessment and Recommendations  Urinary retention  BPH  Hematuria  Left renal mass    We will plan for three-way Shaffer catheter placement and bladder irrigation.  I would recommend holding his anticoagulation for now.  Hematuria is likely secondary to overdistention of the bladder and should improve after  anticoagulation is stopped for a time.  If the patient develops worsening clot retention or worsening hematuria cystoscopy with clot evacuation and fulguration may be needed.  Will plan to start out medication including Flomax and Proscar due to the patient's large prostate and urinary retention.  For more complete evaluation of renal mass versus renal cyst I would recommend renal mass protocol CT scan.  We will plan to hydrate patient overnight and obtain the CT scan tomorrow.    Active Problems:    Acute urinary retention      I discussed the patients findings and my recommendations with patient, family, nursing staff and consulting provider    Sen Rausch Jr., MD  09/06/18  9:50 PM

## 2018-09-07 NOTE — H&P
[facility] Medicine Services  HISTORY AND PHYSICAL    Patient Name: Sandip Feliz  : 1931  MRN: [id number]  Primary Care Physician: Kelly Robison MD    Subjective   Subjective     Chief Complaint:  Urinary retension     HPI:  Sandip Feliz is a 87 y.o. male with a past medical history significant for essential hypertension, osteoporosis, and atrial fibrillation who presents to the ED from PCP office due to urinary retention.  Patient states he has had urinary retention for approximately 2-3 weeks.  He tells me he had urinary frequency with little urine output.   Today he was seen by his PCP whom obtained CT scan of abdomen showing hyperdistention of the bladder.  He was referred to the ED for further evaluation. Patient did have MRI earlier today which showed left kidney having two masses that are not clearly cysts (reviewed by urology).  He denies any prior history of urinary retention, fever, chills, nausea, vomiting, abdominal pain, shortness of air, melena, hematuria, or cough.  In the ED catheter was placed with emptying of bladder however he developed significant hematuria and the catheter became clogged.  Urology seen while in the ED and placed three way christianson catheter.   Patient will be admitted to [facility] under the care of the Hospitalist for further evaluation and treatment.     Review of Systems   Constitutional: Negative for activity change, appetite change, chills, diaphoresis, fatigue, fever and unexpected weight change.   HENT: Negative.    Eyes: Negative for photophobia and visual disturbance.   Respiratory: Negative for cough and shortness of breath.    Cardiovascular: Positive for leg swelling. Negative for chest pain and palpitations.   Gastrointestinal: Positive for abdominal distention. Negative for abdominal pain, anal bleeding, blood in stool, constipation, diarrhea and nausea.   Genitourinary: Positive for decreased urine volume, difficulty urinating,  dysuria and frequency. Negative for flank pain, hematuria and testicular pain.   Musculoskeletal: Negative.    Skin: Negative.    Neurological: Negative.    Psychiatric/Behavioral: Negative.         Otherwise 10-system ROS reviewed and is negative except as mentioned in the HPI.    Personal History     Past Medical History:   Diagnosis Date   • A-fib (CMS/HCC)    • Arthritis    • Hearing impairment    • Hypertension    • Melanoma (CMS/HCC)    • Wears dentures    • Wears glasses    • Wears hearing aid        Past Surgical History:   Procedure Laterality Date   • APPENDECTOMY     • CATARACT EXTRACTION     • KNEE ARTHROPLASTY UNICOMPARTMENTAL Left 2/6/2017    Procedure: UNICOMPARTMENTAL ARTHROPLASTY LEFT knee;  Surgeon: Richard Bennett MD;  Location: [facility];  Service:    • SKIN CANCER EXCISION      MELANOMA   • STOMACH SURGERY         Family History: family history includes Cancer in his mother; Prostate cancer in his brother, brother, and brother; Stroke in his father.     Social History:  reports that he quit smoking about 48 years ago. His smoking use included Pipe. He has never used smokeless tobacco. He reports that he drinks about 1.2 - 2.4 oz of alcohol per week . He reports that he does not use drugs.    Medications:  Prescriptions Prior to Admission   Medication Sig Dispense Refill Last Dose   • amLODIPine-benazepril (LOTREL 5-20) 5-20 MG per capsule Take 1 capsule by mouth Daily.   Taking   • denosumab (PROLIA) 60 MG/ML solution syringe Inject 60 mg under the skin every 6 (six) months.   Taking   • triamterene-hydrochlorothiazide (MAXZIDE-25) 37.5-25 MG per tablet Take 1 tablet by mouth Daily.   Taking   • XARELTO 10 MG tablet Daily.   Taking       No Known Allergies    Objective   Objective     Vital Signs:   Temp:  [98.2 °F (36.8 °C)] 98.2 °F (36.8 °C)  Heart Rate:  [74-90] 90  Resp:  [16-18] 18  BP: (131-168)/(81-97) 154/82        Physical Exam   Constitutional: He is oriented to person, place, and time.  He appears well-developed and well-nourished. No distress.   Alert and oriented x4   HENT:   Head: Normocephalic and atraumatic.   Eyes: Pupils are equal, round, and reactive to light. Conjunctivae and EOM are normal. Right eye exhibits no discharge. Left eye exhibits no discharge. No scleral icterus.   Neck: Normal range of motion. Neck supple. No JVD present. No tracheal deviation present. No thyromegaly present.   Cardiovascular: Normal rate, normal heart sounds and intact distal pulses.  Exam reveals no gallop and no friction rub.    No murmur heard.  Irregular    Pulmonary/Chest: Effort normal and breath sounds normal. No stridor. No respiratory distress. He has no wheezes. He has no rales. He exhibits no tenderness.   Abdominal: Soft. Bowel sounds are normal. He exhibits no distension and no mass. There is no tenderness. There is no rebound and no guarding. No hernia.   Musculoskeletal: Normal range of motion. He exhibits edema. He exhibits no tenderness or deformity.   2-3+ pitting edema to RLE.  1+ edema to LLE   Lymphadenopathy:     He has no cervical adenopathy.   Neurological: He is alert and oriented to person, place, and time.   Skin: Skin is warm and dry. No rash noted. He is not diaphoretic. No erythema. No pallor.   Psychiatric: He has a normal mood and affect. His behavior is normal. Judgment and thought content normal.   Nursing note and vitals reviewed.       Results Reviewed:  I have personally reviewed current lab, radiology, and data and agree.      Results from last 7 days  Lab Units 09/06/18  1409   WBC 10*3/mm3 7.23   HEMOGLOBIN g/dL 10.8*   HEMATOCRIT % 34.7*   PLATELETS 10*3/mm3 335       Results from last 7 days  Lab Units 09/06/18  1409   SODIUM mmol/L 138   POTASSIUM mmol/L 4.4   CHLORIDE mmol/L 106   CO2 mmol/L 24.0   BUN mg/dL 24*   CREATININE mg/dL 1.21   GLUCOSE mg/dL 95   CALCIUM mg/dL 9.6   ALT (SGPT) U/L 14   AST (SGOT) U/L 24     No results found for: BNP  No results found for:  PHART  Imaging Results (last 24 hours)     ** No results found for the last 24 hours. **        Results for orders placed in visit on 05/24/16   SCANNED - ECHOCARDIOGRAM       Assessment/Plan   Assessment / Plan     Hospital Problem List     * (Principal)Acute urinary retention    HTN (hypertension)    Osteoporosis    Paroxysmal atrial fibrillation (CMS/HCC)    Hematuria            Assessment & Plan:  87 year old male referred to the ED from PCP due to urinary retention.     1) Urinary retention with gross hematuria   -etiology unclear  -MRI reviewed by urology: two masses on left kidney that does not appear to be cysts  -three way christianson catheter placed by urology in the ED with significant hematuria after placement, continue bladder irrigation  -plan for cystoscopy if patient develops worsening clot retention  -hold xarelto  -strict I &O  -continue IVF  -CT in am as recommended by urology per renal mass protocol   -Heart Flomax and Proscar    2) PAF  -hold xarelto: last dose yesterday evening  -EKG    3) Urinary tract infection, complicated  -continue rocephin  -await urine cultures  -IVF    4) Essential hypertension  -continue home medications      DVT prophylaxis:teds/scds only     CODE STATUS:  Full code   Code Status and Medical Interventions:   Ordered at: 09/06/18 1973     Level Of Support Discussed With:    Patient     Code Status:    CPR     Medical Interventions (Level of Support Prior to Arrest):    Full       Admission Status:  I believe this patient meets INPATIENT status due to the need for care which can only be reasonably provided in an hospital setting such as aggressive/expedited ancillary services and/or consultation services, the necessity for IV medications, close physician monitoring and/or the possible need for procedures.  In such, I feel patient’s risk for adverse outcomes and need for care warrant INPATIENT evaluation and predict the patient’s care encounter to likely last beyond 2  midnights.     Delia Murray,    09/06/18   11:24 PM      Brief Attending Admission Attestation     I have seen and examined the patient, performing an independent face-to-face diagnostic evaluation with plan of care reviewed and developed with the advanced practice clinician (APC).      Brief Summary Statement/HPI:   Sandip Feliz is a 87 y.o. male patient with a past medical history significant for atrial fibrillation on  Xarelto who presents to the ED due to acute urinary retention discovered by MRI after a visit to the patient's PCP earlier today.  Patient reports a 2-3 week history of difficulty urinating and low back pain.  He reports urgency where he produces very little urine is worsening nocturia.  Prior to this patient states that he would urinate 1 time per night and had no significant history of enlarged prostate.  He also has complaints of lower back pain which was the primary reason that he presented to his primary care physician's office today.  An MRI was ordered of his back which showed significant bladder distention.  Due to findings on imaging, the patient was instructed to present to the ED.    Attending Physical Exam:  Constitutional: No acute distress, awake, alert  Eyes: PERRLA, sclerae anicteric, no conjunctival injection  HENT: NCAT, mucous membranes moist  Neck: Supple, no thyromegaly, no lymphadenopathy, trachea midline  Respiratory: Clear to auscultation bilaterally, nonlabored respirations   Cardiovascular: RRR, no murmurs, rubs, or gallops, palpable pedal pulses bilaterally  Gastrointestinal: Positive bowel sounds, soft, nontender, nondistended  Musculoskeletal: No bilateral ankle edema, no clubbing or cyanosis to extremities  Psychiatric: Appropriate affect, cooperative  Neurologic: Oriented x 3, strength symmetric in all extremities, Cranial Nerves grossly intact to confrontation, speech clear  Genitourinary: Shaffer catheter in place draining red tinged fluid  Skin: No  elpidio        Brief Assessment/Plan :  See above for further detailed assessment and plan developed with APC which I have reviewed and/or edited.      Electronically signed by Delia Murray DO, 09/06/18, 11:23 PM.

## 2018-09-07 NOTE — OP NOTE
Procedure Note    Sandip Feliz      Pre-op Diagnosis:   Urinary retention  Hematuria    Post-op Diagnosis:     Urinary retention  Hematuria    Procedure/CPT® Codes:          Difficult Shaffer catheter placement  Bladder irrigation    Anesthesia: Local    Staff:   Dr. negrete    Estimated Blood Loss: * No surgery found *  Urine Voided: Approximately 1 L    Specimens:                * Cannot find log *      Drains:   [REMOVED] Urethral Catheter Coude 14 Fr. (Removed)   Daily Indications Acute retention 9/6/2018  1:43 PM   Site Assessment Clean;Skin intact;Bleeding 9/6/2018  1:43 PM   Collection Container Standard drainage bag 9/6/2018  1:43 PM   Securement Method Securing device 9/6/2018  1:43 PM       Findings: Hematuria with clot evacuation    Complications: None    History: 87-year-old with urinary retention and gross hematuria.    Operative Report: Under sterile conditions and local anesthesia three-way Shaffer catheter 18 Urdu was placed with return of bloody urine.  Hand irrigation of clots was performed.  Constant bladder irrigation was started.  Patient tolerated the procedure well.    Sen Negrete Jr., MD     Date: 9/6/2018  Time: 9:54 PM

## 2018-09-07 NOTE — PROGRESS NOTES
Discharge Planning Assessment  [facility]     Patient Name: Sandip Feliz  MRN: [id number]  Today's Date: 9/7/2018    Admit Date: 9/6/2018          Discharge Needs Assessment     Row Name 09/07/18 2774       Living Environment    Lives With spouse    Name(s) of Who Lives With Patient Wife- Angélica Feliz    Current Living Arrangements home/apartment/condo    Primary Care Provided by self    Provides Primary Care For no one    Family Caregiver if Needed spouse    Quality of Family Relationships involved;helpful;supportive    Able to Return to Prior Arrangements yes       Transition Planning    Patient/Family Anticipates Transition to home with family    Patient/Family Anticipated Services at Transition none    Transportation Anticipated family or friend will provide       Discharge Needs Assessment    Readmission Within the Last 30 Days no previous admission in last 30 days    Concerns to be Addressed no discharge needs identified;denies needs/concerns at this time    Equipment Currently Used at Home walker, rolling;shower chair    Equipment Needed After Discharge none    Discharge Coordination/Progress Pt. lives in [place] with his spouse.  Spouse stated their niece will assist with transportation at discharge.  Pt. did not have home health prior to admission and uses a walker and shower chair at home.  Pt. denies needing any additional DME and denies needing home health services.            Discharge Plan     Row Name 09/07/18 1358       Plan    Plan home with spouse    Patient/Family in Agreement with Plan yes    Plan Comments SW met with pt. and spouse to discuss discharge planning.  Pt. and spouse deny that pt. has any discharge needs a this time.  SW/CM will remain available to assist [facility] discharge planning should any needs arise.    Final Discharge Disposition Code 01 - home or self-care        Destination     No service coordination in this encounter.      Durable Medical Equipment     No service  coordination in this encounter.      Dialysis/Infusion     No service coordination in this encounter.      Home Medical Care     No service coordination in this encounter.      Social Care     No service coordination in this encounter.                Demographic Summary     Row Name 09/07/18 0873       General Information    Admission Type inpatient    Arrived From physician office    Preferred Language English     Used During This Interaction no    General Information Comments Pt. is followed by Kelly Robison MD for primary care.            Functional Status     Row Name 09/07/18 7173       Functional Status, IADL    IADL Comments Pt. stated he was independent with activities prior to admisison.       Employment/    Employment/ Comments Pt. has insurance and prescription coverge through Medicare [employer], and [employer].              Psychosocial    No documentation.           Abuse/Neglect    No documentation.           Legal    No documentation.           Substance Abuse    No documentation.           Patient Forms    No documentation.         ELVIA Lunsford

## 2018-09-07 NOTE — PLAN OF CARE
Problem: Patient Care Overview  Goal: Plan of Care Review   09/07/18 0111   OTHER   Outcome Summary patient has CBI going, christianson bag out put is a darker pink, tolerating well. patient had a small bowel movement. used some assist to get to [facility]. patient remains in a SR. Will continue to [facility].

## 2018-09-07 NOTE — PLAN OF CARE
Problem: Patient Care Overview  Goal: Plan of Care Review  Outcome: Ongoing (interventions implemented as appropriate)   09/07/18 1630   Coping/Psychosocial   Plan of Care Reviewed With patient   Plan of Care Review   Progress no change   OTHER   Outcome Summary patient still on CBI following at a high rate to keep it from clotting off, up with assist to bedside commode, fall precautions in place,        Problem: Fall Risk (Adult)  Goal: Identify Related Risk Factors and Signs and Symptoms  Outcome: Ongoing (interventions implemented as appropriate)   09/07/18 1630   Fall Risk (Adult)   Related Risk Factors (Fall Risk) gait/mobility problems;age-related changes   Signs and Symptoms (Fall Risk) presence of risk factors     Goal: Absence of Fall  Outcome: Ongoing (interventions implemented as appropriate)   09/07/18 1630   Fall Risk (Adult)   Absence of Fall making progress toward outcome       Problem: Skin Injury Risk (Adult)  Goal: Identify Related Risk Factors and Signs and Symptoms  Outcome: Ongoing (interventions implemented as appropriate)   09/07/18 1630   Skin Injury Risk (Adult)   Related Risk Factors (Skin Injury Risk) mobility impaired     Goal: Skin Health and Integrity  Outcome: Ongoing (interventions implemented as appropriate)   09/07/18 1630   Skin Injury Risk (Adult)   Skin Health and Integrity making progress toward outcome

## 2018-09-07 NOTE — PROGRESS NOTES
[facility] Medicine Services  PROGRESS NOTE    Patient Name: Sandip Feliz  : 1931  MRN: [id number]    Date of Admission: 2018  Length of Stay: 1  Primary Care Physician: Kelly Robison MD    Subjective   Subjective     CC: urinary retention    HPI:  Resting in bed in no acute distress.  3 medical Shaffer catheter is in place and there is pink bloody urine in the bag.  Patient is overall comfortable.  Denies any fever or chills.  No chest pain or shortness of breath or palpitation.  No nausea, vomiting, diarrhea, abdominal pain.    Review of Systems      Otherwise ROS is negative except as mentioned in the HPI.    Objective   Objective     Vital Signs:   Temp:  [97.6 °F (36.4 °C)-99 °F (37.2 °C)] 98.8 °F (37.1 °C)  Heart Rate:  [77-93] 89  Resp:  [16-18] 16  BP: (128-168)/(66-97) 128/75        Physical Exam:  Constitutional: No acute distress, awake, alert  Eyes: PERRLA, sclerae anicteric, no conjunctival injection  HENT: NCAT, mucous membranes moist  Neck: Supple, no thyromegaly, no lymphadenopathy, trachea midline  Respiratory: Clear to auscultation bilaterally, nonlabored respirations   : 3-way catheter in place and there is pink bloody urine in the bag.  Cardiovascular: RRR, no murmurs, rubs, or gallops, palpable pedal pulses bilaterally  Gastrointestinal: Positive bowel sounds, soft, nontender, nondistended  Musculoskeletal: No bilateral ankle edema, no clubbing or cyanosis to extremities  Psychiatric: Appropriate affect, cooperative  Neurologic: Oriented x 3, strength symmetric in all extremities, Cranial Nerves grossly intact to confrontation, speech clear  Skin: No rashes    Results Reviewed:  I have personally reviewed current lab, radiology, and data and agree.      Results from last 7 days  Lab Units 18  0703 18  1409   WBC 10*3/mm3 9.40 7.23   HEMOGLOBIN g/dL 10.2* 10.8*   HEMATOCRIT % 32.6* 34.7*   PLATELETS 10*3/mm3 279 335       Results from  last 7 days  Lab Units 09/07/18  0703 09/06/18  1409   SODIUM mmol/L 140 138   POTASSIUM mmol/L 3.4* 4.4   CHLORIDE mmol/L 109 106   CO2 mmol/L 23.0 24.0   BUN mg/dL 18 24*   CREATININE mg/dL 0.88 1.21   GLUCOSE mg/dL 111* 95   CALCIUM mg/dL 8.5* 9.6   ALT (SGPT) U/L  --  14   AST (SGOT) U/L  --  24     Estimated Creatinine Clearance: 56.9 mL/min (by C-G formula based on SCr of 0.88 mg/dL).  No results found for: BNP    Microbiology Results Abnormal     Procedure Component Value - Date/Time    Urine Culture - Urine, Urine, Clean Catch [[id number]]  (Normal) Collected:  09/06/18 1641    Lab Status:  Preliminary result Specimen:  Urine from Urine, Catheter Updated:  09/07/18 0900     Urine Culture No growth          Imaging Results (last 24 hours)     ** No results found for the last 24 hours. **        Results for orders placed in visit on 05/24/16   SCANNED - ECHOCARDIOGRAM       I have reviewed the medications.      amLODIPine-lisinopril 5-20 mg combo dose  Oral Q24H   ceftriaxone 1 g Intravenous Q24H   finasteride 5 mg Oral Daily   tamsulosin 0.4 mg Oral Daily   triamterene-hydrochlorothiazide 1 tablet Oral Daily         Assessment/Plan   Assessment / Plan     Hospital Problem List     * (Principal)Acute urinary retention    HTN (hypertension)    Osteoporosis    Paroxysmal atrial fibrillation (CMS/[facility])    Hematuria             Brief Hospital Course to date:  Sandip Feliz is a 87 y.o. male with past medical history significant for hypertension, paroxysmal atrial fibrillation on Xarelto.  Patient was admitted for hematuria and urinary retention.      Assessment & Plan:    *Hematuria and urinary retention.  Urology has seen the patient already.    * HTN    * A-fib, was on Xarelto.  PLAN:  - cont current medication and hold Xarelto.  - labs in am.    DVT Prophylaxis:  SCD    CODE STATUS:   Code Status and Medical Interventions:   Ordered at: 09/06/18 9375     Level Of Support Discussed With:    Patient     Code  Status:    CPR     Medical Interventions (Level of Support Prior to Arrest):    Full       Disposition: TBD      Electronically signed by Mariano Rodriguez MD, 09/07/18, 4:27 PM.

## 2018-09-07 NOTE — PROGRESS NOTES
"Urology    Patient Name: Sandip Feliz  Medical Record Number: [id number]  YOB: 1931     LOS: 1 day   Patient Care Team:  Kelly Robison MD as PCP - General (Family Medicine)  Kelly Robison MD as PCP - Claims Attributed    Chief Complaint:    Chief Complaint   Patient presents with   • Back Pain   • Urinary Retention       Subjective     Interval History:     Resting comfortably  CBI flowing    Review of Systems:    The following systems were reviewed and negative;  constitution, respiratory, cardiovascular, gastrointestinal and genitourinary    Objective     Vital Signs  /68 (BP Location: Right arm, Patient Position: Lying)   Pulse 88   Temp 97.9 °F (36.6 °C) (Oral)   Resp 16   Ht 165.1 cm (65\")   Wt 68 kg (150 lb)   SpO2 90%   BMI 24.96 kg/m²   I/O last 3 completed shifts:  In: 19867 [P.O.:450; I.V.:367; Other:73909; IV Piggyback:1050]  Out: 6750 [Urine:6750]  No intake/output data recorded.      Physical Exam:  General Appearance: No apparent distress  Back: No kyphosis present, no scoliosis present,no tenderness to percussion or Palpation  Lungs: Respirations regular, even and  unlabored  Heart: Regular rhythm and normal rate  Chest Wall: No abnormalities observed  Abdomen: Benign  Genital: Three-way catheter in place with clear to slight pink irrigant  Rectal: Deferred  Extremities: Moves all extremities well, no edema no cyanosis, no redness  Pulses: Pulses palpable  Skin: No bleeding, bruising or rash  Lymph nodes: No palpable adenopathy  Neurologic: Neurologically grossly intact     Results Review:     I reviewed the patient's new clinical results.  Lab Results (last 24 hours)     Procedure Component Value Units Date/Time    CBC Auto Differential [[id number]]  (Abnormal) Collected:  09/07/18 0703    Specimen:  Blood Updated:  09/07/18 0718     WBC 9.40 10*3/mm3      RBC 3.79 (L) 10*6/mm3      Hemoglobin 10.2 (L) g/dL      Hematocrit 32.6 (L) %      MCV 86.0 fL      MCH " 26.9 (L) pg      MCHC 31.3 (L) g/dL      RDW 15.5 (H) %      RDW-SD 49.4 fl      MPV 9.4 fL      Platelets 279 10*3/mm3      Neutrophil % 79.7 (H) %      Lymphocyte % 9.4 (L) %      Monocyte % 8.5 %      Eosinophil % 2.0 %      Basophil % 0.3 %      Immature Grans % 0.1 %      Neutrophils, Absolute 7.49 10*3/mm3      Lymphocytes, Absolute 0.88 10*3/mm3      Monocytes, Absolute 0.80 10*3/mm3      Eosinophils, Absolute 0.19 10*3/mm3      Basophils, Absolute 0.03 10*3/mm3      Immature Grans, Absolute 0.01 10*3/mm3     Basic Metabolic Panel [[id number]] Collected:  09/07/18 0703    Specimen:  Blood Updated:  09/07/18 0710    Urine Culture - Urine, Urine, Clean Catch [[id number]] Collected:  09/06/18 1641    Specimen:  Urine from Urine, Catheter Updated:  09/06/18 2023    Urinalysis With Microscopic If Indicated (No Culture) - Urine, Catheter [[id number]]  (Abnormal) Collected:  09/06/18 1456    Specimen:  Urine from Urine, Catheter Updated:  09/06/18 1526     Color, UA Yellow     Appearance, UA Clear     pH, UA 6.5     Specific Gravity, UA 1.020     Glucose, UA Negative     Ketones, UA Trace (A)     Bilirubin, UA Negative     Blood, UA Moderate (2+) (A)     Protein, UA Trace (A)     Leuk Esterase, UA Small (1+) (A)     Nitrite, UA Negative     Urobilinogen, UA 1.0 E.U./dL    Urinalysis, Microscopic Only - Urine, Clean Catch [[id number]]  (Abnormal) Collected:  09/06/18 1456    Specimen:  Urine from Urine, Catheter Updated:  09/06/18 1526     RBC, UA Too Numerous to Count (A) /HPF      WBC, UA 13-20 (A) /HPF      Bacteria, UA 2+ (A) /HPF      Squamous Epithelial Cells, UA 3-6 (A) /HPF      Yeast, UA Small/1+ Budding Yeast /HPF      Hyaline Casts, UA 0-6 /LPF      Methodology Manual Light Microscopy    Comprehensive Metabolic Panel [[id number]]  (Abnormal) Collected:  09/06/18 1409    Specimen:  Blood Updated:  09/06/18 1439     Glucose 95 mg/dL      BUN 24 (H) mg/dL      Creatinine 1.21 mg/dL      Sodium 138 mmol/L       Potassium 4.4 mmol/L      Chloride 106 mmol/L      CO2 24.0 mmol/L      Calcium 9.6 mg/dL      Total Protein 6.5 g/dL      Albumin 4.00 g/dL      ALT (SGPT) 14 U/L      AST (SGOT) 24 U/L      Alkaline Phosphatase 74 U/L      Total Bilirubin 0.3 mg/dL      eGFR Non African Amer 57 (L) mL/min/1.73      Globulin 2.5 gm/dL      A/G Ratio 1.6 g/dL      BUN/Creatinine Ratio 19.8     Anion Gap 8.0 mmol/L     Narrative:       [employer] Guidelines    Stage     Description        GFR  1         Normal or High     90+  2         Mild decrease      60-89  3         Moderate decrease  30-59  4         Severe decrease    15-29  5         Kidney failure     <15    The MDRD GFR formula is only valid for adults with stable renal function between ages 18 and 70.    CBC & Differential [[id number]] Collected:  09/06/18 1409    Specimen:  Blood Updated:  09/06/18 1429    Narrative:       The following orders were created for panel order CBC & Differential.  Procedure                               Abnormality         Status                     ---------                               -----------         ------                     CBC Auto Differential[[id number]]         Abnormal            Final result                 Please view results for these tests on the individual orders.    CBC Auto Differential [[id number]]  (Abnormal) Collected:  09/06/18 1409    Specimen:  Blood Updated:  09/06/18 1429     WBC 7.23 10*3/mm3      RBC 4.01 (L) 10*6/mm3      Hemoglobin 10.8 (L) g/dL      Hematocrit 34.7 (L) %      MCV 86.5 fL      MCH 26.9 (L) pg      MCHC 31.1 (L) g/dL      RDW 15.8 (H) %      RDW-SD 50.3 fl      MPV 9.5 fL      Platelets 335 10*3/mm3      Neutrophil % 70.6 %      Lymphocyte % 14.4 (L) %      Monocyte % 9.4 %      Eosinophil % 5.0 (H) %      Basophil % 0.6 %      Immature Grans % 0.0 %      Neutrophils, Absolute 5.11 10*3/mm3      Lymphocytes, Absolute 1.04 10*3/mm3      Monocytes, Absolute 0.68 10*3/mm3       Eosinophils, Absolute 0.36 (H) 10*3/mm3      Basophils, Absolute 0.04 10*3/mm3      Immature Grans, Absolute 0.00 10*3/mm3     PSA, Total & Free [[id number]] Collected:  09/06/18 1409    Specimen:  Blood Updated:  09/06/18 1414          Medication Review:    Current Facility-Administered Medications:   •  acetaminophen (TYLENOL) tablet 650 mg, 650 mg, Oral, Q4H PRN, Luke, Anyi, APRN, 650 mg at 09/07/18 0219  •  amLODIPine (NORVASC) 5 mg, lisinopril (PRINIVIL,ZESTRIL) 20 mg, , Oral, Q24H, Luke, Anyi, APRN  •  cefTRIAXone (ROCEPHIN) IVPB 1 g, 1 g, Intravenous, Q24H, Sen Rausch Jr., MD  •  finasteride (PROSCAR) tablet 5 mg, 5 mg, Oral, Daily, Sen Rausch Jr., MD  •  lidocaine (UROJET) jelly, , Urethral, PRN, Edson Hancock MD  •  Insert peripheral IV, , , Once **AND** sodium chloride 0.9 % flush 10 mL, 10 mL, Intravenous, PRN, Edson Hancock MD  •  sodium chloride 0.9 % infusion, 100 mL/hr, Intravenous, Continuous, Luke, Anyi, APRN, Last Rate: 100 mL/hr at 09/06/18 2322, 100 mL/hr at 09/06/18 2322  •  tamsulosin (FLOMAX) 24 hr capsule 0.4 mg, 0.4 mg, Oral, Daily, Sen Rausch Jr., MD  •  triamterene-hydrochlorothiazide (MAXZIDE-25) 37.5-25 MG per tablet 1 tablet, 1 tablet, Oral, Daily, Luke, Anyi, APRN    Assessment and Plan    Doing well with bladder irrigation.  Titrate to light pink    Continue to hold anticoagulation    Continue outlet medication with Flomax and finasteride    Continue Rocephin for 3 days secondary to manipulation in the emergency department    CT scan renal mass protocol later today    Principal Problem:    Acute urinary retention  Active Problems:    HTN (hypertension)    Osteoporosis    Paroxysmal atrial fibrillation (CMS/HCC)    Hematuria          Plan for disposition:Per primary team    Sen Rausch Jr., MD  09/07/18  7:34 AM

## 2018-09-08 LAB — BACTERIA SPEC AEROBE CULT: NORMAL

## 2018-09-08 PROCEDURE — 25010000002 CEFTRIAXONE PER 250 MG: Performed by: UROLOGY

## 2018-09-08 PROCEDURE — 99232 SBSQ HOSP IP/OBS MODERATE 35: CPT | Performed by: INTERNAL MEDICINE

## 2018-09-08 RX ORDER — POTASSIUM CHLORIDE 7.45 MG/ML
10 INJECTION INTRAVENOUS
Status: DISCONTINUED | OUTPATIENT
Start: 2018-09-08 | End: 2018-09-11 | Stop reason: HOSPADM

## 2018-09-08 RX ADMIN — TAMSULOSIN HYDROCHLORIDE 0.4 MG: 0.4 CAPSULE ORAL at 08:37

## 2018-09-08 RX ADMIN — CEFTRIAXONE SODIUM 1 G: 1 INJECTION, SOLUTION INTRAVENOUS at 17:33

## 2018-09-08 RX ADMIN — TRIAMTERENE AND HYDROCHLOROTHIAZIDE 1 TABLET: 37.5; 25 TABLET ORAL at 08:41

## 2018-09-08 RX ADMIN — ACETAMINOPHEN 650 MG: 325 TABLET ORAL at 08:42

## 2018-09-08 RX ADMIN — FINASTERIDE 5 MG: 5 TABLET, FILM COATED ORAL at 08:38

## 2018-09-08 RX ADMIN — ACETAMINOPHEN 650 MG: 325 TABLET ORAL at 14:48

## 2018-09-08 RX ADMIN — ACETAMINOPHEN 650 MG: 325 TABLET ORAL at 22:33

## 2018-09-08 RX ADMIN — LISINOPRIL: 20 TABLET ORAL at 08:37

## 2018-09-08 NOTE — PROGRESS NOTES
[facility] Medicine Services  PROGRESS NOTE    Patient Name: Sandip Feliz  : 1931  MRN: [id number]    Date of Admission: 2018  Length of Stay: 2  Primary Care Physician: Kelly Robison MD    Subjective   Subjective     CC: urinary retention    HPI:  Resting in bed in no acute distress. Bladder irrigation continues with light pink urine in the container.Denies any fever or chills.  No chest pain or shortness of breath or palpitation.  No nausea, vomiting, diarrhea, abdominal pain.    Review of Systems      Otherwise ROS is negative except as mentioned in the HPI.    Objective   Objective     Vital Signs:   Temp:  [97.8 °F (36.6 °C)-98.5 °F (36.9 °C)] 97.8 °F (36.6 °C)  Heart Rate:  [62-92] 92  Resp:  [16-18] 18  BP: (117-173)/(67-93) 117/79        Physical Exam:  Constitutional: No acute distress, awake, alert  Eyes: PERRLA, sclerae anicteric, no conjunctival injection  HENT: NCAT, mucous membranes moist  Neck: Supple, no thyromegaly, no lymphadenopathy, trachea midline  Respiratory: Clear to auscultation bilaterally, nonlabored respirations   : 3-way catheter in place and there is pink bloody urine in the bag.  Cardiovascular: RRR, no murmurs, rubs, or gallops, palpable pedal pulses bilaterally  Gastrointestinal: Positive bowel sounds, soft, nontender, nondistended  Musculoskeletal: No bilateral ankle edema, no clubbing or cyanosis to extremities  Psychiatric: Appropriate affect, cooperative  Neurologic: Oriented x 3, strength symmetric in all extremities, Cranial Nerves grossly intact to confrontation, speech clear  Skin: No rashes    Results Reviewed:  I have personally reviewed current lab, radiology, and data and agree.      Results from last 7 days  Lab Units 18  0703 18  1409   WBC 10*3/mm3 9.40 7.23   HEMOGLOBIN g/dL 10.2* 10.8*   HEMATOCRIT % 32.6* 34.7*   PLATELETS 10*3/mm3 279 335       Results from last 7 days  Lab Units 18  0703  "09/06/18  1409   SODIUM mmol/L 140 138   POTASSIUM mmol/L 3.4* 4.4   CHLORIDE mmol/L 109 106   CO2 mmol/L 23.0 24.0   BUN mg/dL 18 24*   CREATININE mg/dL 0.88 1.21   GLUCOSE mg/dL 111* 95   CALCIUM mg/dL 8.5* 9.6   ALT (SGPT) U/L  --  14   AST (SGOT) U/L  --  24     Estimated Creatinine Clearance: 56.9 mL/min (by C-G formula based on SCr of 0.88 mg/dL).  No results found for: BNP    Microbiology Results Abnormal     Procedure Component Value - Date/Time    Urine Culture - Urine, Urine, Clean Catch [[id number]]  (Normal) Collected:  09/06/18 1641    Lab Status:  Final result Specimen:  Urine from Urine, Catheter Updated:  09/08/18 0757     Urine Culture No growth at 2 days          Imaging Results (last 24 hours)     Procedure Component Value Units Date/Time    CT Abdomen Pelvis With & Without Contrast [[id number]] Collected:  09/07/18 1949     Updated:  09/07/18 1949    Narrative:       EXAMINATION: CT ABDOMEN PELVIS W/WO CONTRAST - 9/7/2018      INDICATION: R33.8-Other retention of urine; R10.2-Pelvic and perineal  pain; R31.0-Gross hematuria.     TECHNIQUE: 5 mm unenhanced, arterial venous and delayed venous phase  images through the abdomen and pelvis. The radiation dose reduction  device was turned on for each scan per the ALARA (As Low as Reasonably  Achievable) protocol.     COMPARISON: None.     FINDINGS: Patient history indicates acute urinary retention, outside CT  study with hyperdistention of the bladder, abnormal outside MRI with  left kidney \"mass,\" history of hematuria.     On the unenhanced CT, all of the patient's discrete exophytic renal  \"masses\" measure water density. There are a few small intrinsic renal  lesions that may represent cysts but are subcentimeter in size. There is  a 4 mm nonobstructing left lower pole renal calculus and a 3 mm right  midpole calculus. There is mild fullness of both renal collecting  systems and proximal ureters.     Postcontrast images show no evidence of enhancement " "of any of the  patient's renal lesions either immediately or on delayed imaging.  Delayed images show no contrast opacification of the ureters, apparently  due to mild obstructive uropathy at the level the bladder. Ureters are  dilated all the way down to the level of the right and left UVJ. No  ureteral calculus is seen on either the right or left. The bladder is  diffusely thick-walled and irregular and contains a stringy and  semilunar \"mass\" with a few air bubbles most likely representing clot in  the dependent portion near the bladder dome. This does not have the  typical polypoid appearance of bladder mass and shows no contrast  enhancement. Moreover it measures typical value for clot at 68  Hounsfield units. A small amount of air is seen in the bladder from  patient's catheter.     Elsewhere at the level of this scan, there is minimal atelectasis in the  lower lungs. No significant abnormalities are noted of the liver,  spleen, pancreas, adrenal glands, or gallbladder. No free air, ascites,  or adenopathy is seen. Bowel loops are normal in caliber and grossly  normal in appearance. Bony structures appear intact..       Impression:       1. Multiple bilateral renal cysts, and a couple of nonobstructing  bilateral renal calculi. No evidence of renal neoplasm.  2. Mild bilateral obstructive uropathy at the level of the right and  left UVJ apparently due to the patient's diffuse bladder wall  thickening.  3. Nonenhancing approximately 3 cm clot in the bladder lumen. No  evidence of discrete polypoid bladder mass.  4. No other evidence of acute intra-abdominal or intrapelvic disease  elsewhere.     DICTATED:   9/7/2018  EDITED/ls :   9/7/2018            Results for orders placed in visit on 05/24/16   SCANNED - ECHOCARDIOGRAM       I have reviewed the medications.      amLODIPine-lisinopril 5-20 mg combo dose  Oral Q24H   ceftriaxone 1 g Intravenous Q24H   finasteride 5 mg Oral Daily   tamsulosin 0.4 mg Oral Daily "   triamterene-hydrochlorothiazide 1 tablet Oral Daily         Assessment/Plan   Assessment / Plan     Hospital Problem List     * (Principal)Acute urinary retention    HTN (hypertension)    Osteoporosis    Paroxysmal atrial fibrillation (CMS/HCC)    Hematuria             Brief Hospital Course to date:  Sandip Feliz is a 87 y.o. male with past medical history significant for hypertension, paroxysmal atrial fibrillation on Xarelto.  Patient was admitted for hematuria and urinary retention.      Assessment & Plan:    *Hematuria and urinary retention.  Urology on board.     * HTN    * A-fib, was on Xarelto, which is currently on hold.  PLAN:  - cont current medication and hold Xarelto.  - continue bladder irrigation    DVT Prophylaxis:  SCD    CODE STATUS:   Code Status and Medical Interventions:   Ordered at: 09/06/18 1081     Level Of Support Discussed With:    Patient     Code Status:    CPR     Medical Interventions (Level of Support Prior to Arrest):    Full       Disposition: TBD      Electronically signed by Mariano Rodriguez MD, 09/08/18, 5:11 PM.

## 2018-09-08 NOTE — PLAN OF CARE
Problem: Patient Care Overview  Goal: Plan of Care Review  Outcome: Ongoing (interventions implemented as appropriate)   09/07/18 2019   Coping/Psychosocial   Plan of Care Reviewed With patient   Plan of Care Review   Progress no change   OTHER   Outcome Summary patient arrived on floor at 1700, taken to CT for abdomen scan, CBI at high rate, manually removed clots couple of times due to flow obstruction. Output pink, clear with clots. Scuds applied on patient, no skin issues, other than blanchable redness to buttocks, waffle mattress in place

## 2018-09-08 NOTE — PLAN OF CARE
Problem: Patient Care Overview  Goal: Plan of Care Review  Outcome: Ongoing (interventions implemented as appropriate)   09/08/18 2695   Coping/Psychosocial   Plan of Care Reviewed With patient;spouse   Plan of Care Review   Progress improving   OTHER   Outcome Summary Pt. slept throughout most day. C/o pain to right leg that is controlled well with Tylenol. Bladder irrigation continued. Urine dark pink and few clots but when pt. gets up to chair or back to bed it becomes dark . Monitoring I&O and cardiac monitoring continued with controlled afib . Will continue to monitor. Carmelo Leiva RN

## 2018-09-08 NOTE — PROGRESS NOTES
"Urology    Patient Name: Sandip Feliz  Medical Record Number: [id number]  YOB: 1931     LOS: 2 days   Patient Care Team:  Kelly Robison MD as PCP - General (Family Medicine)  Kelly Robison MD as PCP - Claims Attributed  Beverley More RN as Care Coordinator (Population Health)    Chief Complaint:    Chief Complaint   Patient presents with   • Back Pain   • Urinary Retention       Subjective     Interval History:     Feels better today.  The nurses have hand irrigated a few small clots yesterday and today.  He denies abdominal or bladder pain.  The urine is light pink on fast irrigation.  His main complaints are back and leg pain and he would like to ambulate or sit up in a chair.    Review of Systems:    The following systems were reviewed and negative;  constitution, respiratory, cardiovascular and gastrointestinal    Objective     Vital Signs  /80 (BP Location: Right arm, Patient Position: Sitting)   Pulse 86   Temp 98.5 °F (36.9 °C) (Oral)   Resp 18   Ht 165.1 cm (65\")   Wt 68 kg (150 lb)   SpO2 97%   BMI 24.96 kg/m²   I/O last 3 completed shifts:  In: 43560.8 [P.O.:1170; I.V.:1469.8; Other:57424; IV Piggyback:50]  Out: -1125   No intake/output data recorded.      Physical Exam:  General Appearance: No acute distress, lying in bed, appears comfortable, pleasant  Lungs: Respirations regular, even and  unlabored  Abdomen: Flat, soft, nontender  Genital: Penis and scrotum normal.  Three-way Shaffer catheter intact with light pink urine on fast CBI     Results Review:     I reviewed the patient's new clinical results.  I reviewed the patient's new imaging results and agree with the interpretation.  Lab Results (last 24 hours)     Procedure Component Value Units Date/Time    Urine Culture - Urine, Urine, Clean Catch [[id number]]  (Normal) Collected:  09/06/18 1641    Specimen:  Urine from Urine, Catheter Updated:  09/08/18 0757     Urine Culture No growth at 2 days    "       Medication Review:    Current Facility-Administered Medications:   •  acetaminophen (TYLENOL) tablet 650 mg, 650 mg, Oral, Q4H PRN, Luke, Anyi, APRN, 650 mg at 09/08/18 0842  •  amLODIPine (NORVASC) 5 mg, lisinopril (PRINIVIL,ZESTRIL) 20 mg, , Oral, Q24H, Luke, Anyi, APRN  •  cefTRIAXone (ROCEPHIN) IVPB 1 g, 1 g, Intravenous, Q24H, Sen Rausch Jr., MD, Last Rate: 100 mL/hr at 09/07/18 1709, 1 g at 09/07/18 1709  •  finasteride (PROSCAR) tablet 5 mg, 5 mg, Oral, Daily, Sen Rausch Jr., MD, 5 mg at 09/08/18 0838  •  lidocaine (UROJET) jelly, , Urethral, PRN, Edson Hancock MD  •  Insert peripheral IV, , , Once **AND** sodium chloride 0.9 % flush 10 mL, 10 mL, Intravenous, PRN, Edson Hancock MD  •  sodium chloride 0.9 % infusion, 100 mL/hr, Intravenous, Continuous, Luke, Anyi, APRN, Last Rate: 100 mL/hr at 09/07/18 1855, 100 mL/hr at 09/07/18 1855  •  tamsulosin (FLOMAX) 24 hr capsule 0.4 mg, 0.4 mg, Oral, Daily, Sen Rausch Jr., MD, 0.4 mg at 09/08/18 0837  •  triamterene-hydrochlorothiazide (MAXZIDE-25) 37.5-25 MG per tablet 1 tablet, 1 tablet, Oral, Daily, Luke, Anyi, APRN, 1 tablet at 09/08/18 0841    Assessment and Plan    87-year-old male with urinary retention and gross hematuria.  He had urinary retention first and had gross hematuria after Shaffer catheter placement.  He started tamsulosin and finasteride this admission.  He is on continuous bladder irrigation and beginning hand irrigation of a few clots.  I reviewed his CT scan which shows bilateral renal cysts and bilateral small nonobstructing renal stones and a clot within the bladder.  The urine appears to be clearing.  He is overall comfortable.    Plan: Continue bladder irrigation, wean as tolerated.  Conservative management of the renal cysts and nonobstructing renal stones.  Continue tamsulosin and finasteride.  He may go home when the irrigation is weaned off.    Principal Problem:     Acute urinary retention  Active Problems:    HTN (hypertension)    Osteoporosis    Paroxysmal atrial fibrillation (CMS/HCC)    Hematuria          Plan for disposition:Where: home and When:  1-3 days    Edgar Griffin MD  09/08/18  10:27 AM

## 2018-09-09 ENCOUNTER — EPISODE CHANGES (OUTPATIENT)
Dept: CASE MANAGEMENT | Facility: OTHER | Age: 83
End: 2018-09-09

## 2018-09-09 LAB
BASOPHILS # BLD AUTO: 0.03 10*3/MM3 (ref 0–0.2)
BASOPHILS NFR BLD AUTO: 0.3 % (ref 0–1)
DEPRECATED RDW RBC AUTO: 47.3 FL (ref 37–54)
EOSINOPHIL # BLD AUTO: 0.67 10*3/MM3 (ref 0–0.3)
EOSINOPHIL NFR BLD AUTO: 7.6 % (ref 0–3)
ERYTHROCYTE [DISTWIDTH] IN BLOOD BY AUTOMATED COUNT: 15.7 % (ref 11.3–14.5)
HCT VFR BLD AUTO: 30.9 % (ref 38.9–50.9)
HGB BLD-MCNC: 9.9 G/DL (ref 13.1–17.5)
IMM GRANULOCYTES # BLD: 0.01 10*3/MM3 (ref 0–0.03)
IMM GRANULOCYTES NFR BLD: 0.1 % (ref 0–0.6)
LYMPHOCYTES # BLD AUTO: 1.1 10*3/MM3 (ref 0.6–4.8)
LYMPHOCYTES NFR BLD AUTO: 12.4 % (ref 24–44)
MCH RBC QN AUTO: 26.7 PG (ref 27–31)
MCHC RBC AUTO-ENTMCNC: 32 G/DL (ref 32–36)
MCV RBC AUTO: 83.3 FL (ref 80–99)
MONOCYTES # BLD AUTO: 0.79 10*3/MM3 (ref 0–1)
MONOCYTES NFR BLD AUTO: 8.9 % (ref 0–12)
NEUTROPHILS # BLD AUTO: 6.26 10*3/MM3 (ref 1.5–8.3)
NEUTROPHILS NFR BLD AUTO: 70.8 % (ref 41–71)
PHOSPHATE SERPL-MCNC: 2.4 MG/DL (ref 2.4–5.1)
PLATELET # BLD AUTO: 316 10*3/MM3 (ref 150–450)
PMV BLD AUTO: 10.3 FL (ref 6–12)
POTASSIUM BLD-SCNC: 3.6 MMOL/L (ref 3.5–5.5)
RBC # BLD AUTO: 3.71 10*6/MM3 (ref 4.2–5.76)
WBC NRBC COR # BLD: 8.85 10*3/MM3 (ref 3.5–10.8)

## 2018-09-09 PROCEDURE — 85025 COMPLETE CBC W/AUTO DIFF WBC: CPT | Performed by: INTERNAL MEDICINE

## 2018-09-09 PROCEDURE — 25010000002 CEFTRIAXONE PER 250 MG: Performed by: INTERNAL MEDICINE

## 2018-09-09 PROCEDURE — 84132 ASSAY OF SERUM POTASSIUM: CPT | Performed by: INTERNAL MEDICINE

## 2018-09-09 PROCEDURE — 84100 ASSAY OF PHOSPHORUS: CPT | Performed by: INTERNAL MEDICINE

## 2018-09-09 PROCEDURE — 99232 SBSQ HOSP IP/OBS MODERATE 35: CPT | Performed by: INTERNAL MEDICINE

## 2018-09-09 RX ORDER — SUMATRIPTAN 50 MG/1
25 TABLET, FILM COATED ORAL ONCE
Status: DISCONTINUED | OUTPATIENT
Start: 2018-09-09 | End: 2018-09-09

## 2018-09-09 RX ORDER — POTASSIUM CHLORIDE 750 MG/1
40 CAPSULE, EXTENDED RELEASE ORAL AS NEEDED
Status: DISCONTINUED | OUTPATIENT
Start: 2018-09-09 | End: 2018-09-11 | Stop reason: HOSPADM

## 2018-09-09 RX ORDER — POTASSIUM CHLORIDE 1.5 G/1.77G
40 POWDER, FOR SOLUTION ORAL AS NEEDED
Status: DISCONTINUED | OUTPATIENT
Start: 2018-09-09 | End: 2018-09-11 | Stop reason: HOSPADM

## 2018-09-09 RX ADMIN — POTASSIUM CHLORIDE 40 MEQ: 750 CAPSULE, EXTENDED RELEASE ORAL at 01:14

## 2018-09-09 RX ADMIN — POTASSIUM CHLORIDE 40 MEQ: 750 CAPSULE, EXTENDED RELEASE ORAL at 05:03

## 2018-09-09 RX ADMIN — SODIUM CHLORIDE 100 ML/HR: 9 INJECTION, SOLUTION INTRAVENOUS at 16:48

## 2018-09-09 RX ADMIN — SODIUM CHLORIDE 100 ML/HR: 9 INJECTION, SOLUTION INTRAVENOUS at 06:32

## 2018-09-09 RX ADMIN — TRIAMTERENE AND HYDROCHLOROTHIAZIDE 1 TABLET: 37.5; 25 TABLET ORAL at 08:06

## 2018-09-09 RX ADMIN — ACETAMINOPHEN 650 MG: 325 TABLET ORAL at 17:58

## 2018-09-09 RX ADMIN — FINASTERIDE 5 MG: 5 TABLET, FILM COATED ORAL at 08:05

## 2018-09-09 RX ADMIN — ACETAMINOPHEN 650 MG: 325 TABLET ORAL at 08:05

## 2018-09-09 RX ADMIN — TAMSULOSIN HYDROCHLORIDE 0.4 MG: 0.4 CAPSULE ORAL at 08:06

## 2018-09-09 RX ADMIN — LISINOPRIL: 20 TABLET ORAL at 08:06

## 2018-09-09 RX ADMIN — CEFTRIAXONE SODIUM 1 G: 1 INJECTION, SOLUTION INTRAVENOUS at 17:58

## 2018-09-09 NOTE — PROGRESS NOTES
[facility] Medicine Services  PROGRESS NOTE    Patient Name: Sandip Feliz  : 1931  MRN: [id number]    Date of Admission: 2018  Length of Stay: 3  Primary Care Physician: Kelly Robison MD    Subjective   Subjective     CC: urinary retention    HPI:  Resting in bed in no acute distress. Bladder irrigation continues but patient complaining of bloating and feels that the bladder is not draining properly. Denies any fever or chills.  No chest pain or shortness of breath or palpitation.  No nausea, vomiting, diarrhea, abdominal pain.    Review of Systems      Otherwise ROS is negative except as mentioned in the HPI.    Objective   Objective     Vital Signs:   Temp:  [97.4 °F (36.3 °C)-98.2 °F (36.8 °C)] 97.4 °F (36.3 °C)  Heart Rate:  [] 112  Resp:  [12-18] 18  BP: (121-161)/(61-95) 161/95        Physical Exam:  Constitutional: No acute distress, awake, alert  Eyes: PERRLA, sclerae anicteric, no conjunctival injection  HENT: NCAT, mucous membranes moist  Neck: Supple, no thyromegaly, no lymphadenopathy, trachea midline  Respiratory: Clear to auscultation bilaterally, nonlabored respirations   : 3-way catheter in place and there is pink bloody urine in the bag.  Cardiovascular: RRR, no murmurs, rubs, or gallops, palpable pedal pulses bilaterally  Gastrointestinal: Positive bowel sounds, soft, nontender, nondistended  Musculoskeletal: No bilateral ankle edema, no clubbing or cyanosis to extremities  Psychiatric: Appropriate affect, cooperative  Neurologic: Oriented x 3, strength symmetric in all extremities, Cranial Nerves grossly intact to confrontation, speech clear  Skin: No rashes    Results Reviewed:  I have personally reviewed current lab, radiology, and data and agree.      Results from last 7 days  Lab Units 18  0647 18  0703 18  1409   WBC 10*3/mm3 8.85 9.40 7.23   HEMOGLOBIN g/dL 9.9* 10.2* 10.8*   HEMATOCRIT % 30.9* 32.6* 34.7*   PLATELETS  10*3/mm3 316 279 335       Results from last 7 days  Lab Units 09/09/18  0647 09/07/18  0703 09/06/18  1409   SODIUM mmol/L  --  140 138   POTASSIUM mmol/L 3.6 3.4* 4.4   CHLORIDE mmol/L  --  109 106   CO2 mmol/L  --  23.0 24.0   BUN mg/dL  --  18 24*   CREATININE mg/dL  --  0.88 1.21   GLUCOSE mg/dL  --  111* 95   CALCIUM mg/dL  --  8.5* 9.6   ALT (SGPT) U/L  --   --  14   AST (SGOT) U/L  --   --  24     Estimated Creatinine Clearance: 56.9 mL/min (by C-G formula based on SCr of 0.88 mg/dL).  No results found for: BNP    Microbiology Results Abnormal     Procedure Component Value - Date/Time    Urine Culture - Urine, Urine, Clean Catch [[id number]]  (Normal) Collected:  09/06/18 1641    Lab Status:  Final result Specimen:  Urine from Urine, Catheter Updated:  09/08/18 0759     Urine Culture No growth at 2 days          Imaging Results (last 24 hours)     ** No results found for the last 24 hours. **        Results for orders placed in visit on 05/24/16   SCANNED - ECHOCARDIOGRAM       I have reviewed the medications.      amLODIPine-lisinopril 5-20 mg combo dose  Oral Q24H   ceftriaxone 1 g Intravenous Q24H   finasteride 5 mg Oral Daily   tamsulosin 0.4 mg Oral Daily   triamterene-hydrochlorothiazide 1 tablet Oral Daily         Assessment/Plan   Assessment / Plan     Hospital Problem List     * (Principal)Acute urinary retention    HTN (hypertension)    Osteoporosis    Paroxysmal atrial fibrillation (CMS/[facility])    Hematuria             Brief Hospital Course to date:  Sandip Feliz is a 87 y.o. male with past medical history significant for hypertension, paroxysmal atrial fibrillation on Xarelto.  Patient was admitted for hematuria and urinary retention.      Assessment & Plan:    *Hematuria and urinary retention.  Urology on board.     * HTN    * A-fib, was on Xarelto, which is currently on hold.    PLAN:  - cont current medication and hold Xarelto.  - continue bladder irrigation.  Nurse was instructed to check  for proper drainage of the bladder.  - labs in am.    DVT Prophylaxis:  SCD    CODE STATUS:   Code Status and Medical Interventions:   Ordered at: 09/06/18 6031     Level Of Support Discussed With:    Patient     Code Status:    CPR     Medical Interventions (Level of Support Prior to Arrest):    Full       Disposition: TBD      Electronically signed by Mariano Rodriguez MD, 09/09/18, 5:56 PM.

## 2018-09-09 NOTE — PLAN OF CARE
Problem: Patient Care Overview  Goal: Plan of Care Review  Outcome: Ongoing (interventions implemented as appropriate)   09/09/18 8075   Coping/Psychosocial   Plan of Care Reviewed With patient   Plan of Care Review   Progress no change   OTHER   Outcome Summary Continuous bladder irrigation continues. Clots evactuated/irrigated twice this shift while attempting to titrate slower irrigation rate. Bladder volume assessed, over 700 ml present in bladder. Pt c/o pain/bladder fullness and decreased drain output at times of syringe irrigation. [place] 1 [place] [facility] [place] episodes of diarrhea this shift. Tylenol given for PRN pain control in joints. CBI on a moderate flow rate currently, was turned off this am by MD until pt acquired blockage. Urine light pink in color with clots present in drainage bag. Urology following. HTN remains, NSR with PAC on monitor, hx afib. Plan to d/c home in 1-2 days or when can tolerate CBI being off.

## 2018-09-09 NOTE — PLAN OF CARE
Problem: Patient Care Overview  Goal: Plan of Care Review  Outcome: Ongoing (interventions implemented as appropriate)   09/09/18 9018   Coping/Psychosocial   Plan of Care Reviewed With patient   Plan of Care Review   Progress no change   OTHER   Outcome Summary Pt resting in bed. C/O of intermittent RLE pain. CBI running 3/4 open. Urine light pink. A-fib, controlled rate. Plan of care reviewed with patient . Verbalized understanding and voiced no concerns.

## 2018-09-09 NOTE — PROGRESS NOTES
"Urology    Patient Name: Sandip Feliz  Medical Record Number: [id number]  YOB: 1931     LOS: 3 days   Patient Care Team:  Kelly Robison MD as PCP - General (Family Medicine)  Kelly Robison MD as PCP - Claims Attributed  Beverley More RN as Care Coordinator (Population Health)    Chief Complaint:    Chief Complaint   Patient presents with   • Back Pain   • Urinary Retention       Subjective     Interval History:     No complaints today.  The urine is pink tinged on moderate CBI.  The nurse reported that the urine became more bloody after weaning the CBI.  The patient denies bladder or abdominal pain.  His back feels better after getting out of bed.    Review of Systems:    The following systems were reviewed and negative;  constitution, respiratory, cardiovascular and gastrointestinal    Objective     Vital Signs  /79 (BP Location: Right arm, Patient Position: Sitting)   Pulse 72   Temp 97.6 °F (36.4 °C) (Oral)   Resp 12   Ht 165.1 cm (65\")   Wt 68 kg (150 lb)   SpO2 97%   BMI 24.96 kg/m²   I/O last 3 completed shifts:  In: 1290 [P.O.:240; I.V.:1000; IV Piggyback:50]  Out: -19700   No intake/output data recorded.      Physical Exam:  General Appearance: No acute distress, sitting up in bed, pleasant, appears comfortable  Lungs: Respirations regular, even and  unlabored  Abdomen: Flat, nondistended, nontender  Genital: Penis and scrotum normal.  Shaffer catheter intact with light pink tinged urine on moderate CBI.     Results Review:     I reviewed the patient's new clinical results.  Lab Results (last 24 hours)     Procedure Component Value Units Date/Time    Potassium [[id number]]  (Normal) Collected:  09/09/18 0647    Specimen:  Blood Updated:  09/09/18 0859     Potassium 3.6 mmol/L     Phosphorus [[id number]]  (Normal) Collected:  09/09/18 0647    Specimen:  Blood Updated:  09/09/18 0732     Phosphorus 2.4 mg/dL     CBC & Differential [[id number]] Collected:  09/09/18 " 0647    Specimen:  Blood Updated:  09/09/18 0729    Narrative:       The following orders were created for panel order CBC & Differential.  Procedure                               Abnormality         Status                     ---------                               -----------         ------                     CBC Auto Differential[[id number]]        Abnormal            Final result                 Please view results for these tests on the individual orders.    CBC Auto Differential [[id number]]  (Abnormal) Collected:  09/09/18 0647    Specimen:  Blood Updated:  09/09/18 0729     WBC 8.85 10*3/mm3      RBC 3.71 (L) 10*6/mm3      Hemoglobin 9.9 (L) g/dL      Hematocrit 30.9 (L) %      MCV 83.3 fL      MCH 26.7 (L) pg      MCHC 32.0 g/dL      RDW 15.7 (H) %      RDW-SD 47.3 fl      MPV 10.3 fL      Platelets 316 10*3/mm3      Neutrophil % 70.8 %      Lymphocyte % 12.4 (L) %      Monocyte % 8.9 %      Eosinophil % 7.6 (H) %      Basophil % 0.3 %      Immature Grans % 0.1 %      Neutrophils, Absolute 6.26 10*3/mm3      Lymphocytes, Absolute 1.10 10*3/mm3      Monocytes, Absolute 0.79 10*3/mm3      Eosinophils, Absolute 0.67 (H) 10*3/mm3      Basophils, Absolute 0.03 10*3/mm3      Immature Grans, Absolute 0.01 10*3/mm3           Medication Review:    Current Facility-Administered Medications:   •  acetaminophen (TYLENOL) tablet 650 mg, 650 mg, Oral, Q4H PRN, Luke, Anyi, APRN, 650 mg at 09/09/18 0805  •  amLODIPine (NORVASC) 5 mg, lisinopril (PRINIVIL,ZESTRIL) 20 mg, , Oral, Q24H, Luke, Anyi, APRN  •  cefTRIAXone (ROCEPHIN) IVPB 1 g, 1 g, Intravenous, Q24H, Sen Rausch Jr., MD, Last Rate: 100 mL/hr at 09/08/18 1733, 1 g at 09/08/18 1733  •  finasteride (PROSCAR) tablet 5 mg, 5 mg, Oral, Daily, Sen Rausch Jr., MD, 5 mg at 09/09/18 0805  •  lidocaine (UROJET) jelly, , Urethral, PRN, Edson Hancock MD  •  potassium chloride (KLOR-CON) packet 40 mEq, 40 mEq, Oral, PRN, Satish Delgadillo,  BELKIS  •  potassium chloride (MICRO-K) CR capsule 40 mEq, 40 mEq, Oral, PRN, Satish Delgadillo PA-C, 40 mEq at 09/09/18 0503  •  potassium chloride 10 mEq in 100 mL IVPB, 10 mEq, Intravenous, Q1H PRN, Mariano Rodriguez MD  •  Insert peripheral IV, , , Once **AND** sodium chloride 0.9 % flush 10 mL, 10 mL, Intravenous, PRN, Edson Hancock MD  •  sodium chloride 0.9 % infusion, 100 mL/hr, Intravenous, Continuous, Luke, Anyi, APRN, Last Rate: 100 mL/hr at 09/09/18 0632, 100 mL/hr at 09/09/18 0632  •  tamsulosin (FLOMAX) 24 hr capsule 0.4 mg, 0.4 mg, Oral, Daily, Sen Rausch Jr., MD, 0.4 mg at 09/09/18 0806  •  triamterene-hydrochlorothiazide (MAXZIDE-25) 37.5-25 MG per tablet 1 tablet, 1 tablet, Oral, Daily, Luke, Anyi, APRN, 1 tablet at 09/09/18 0806    Assessment and Plan    87-year-old male with urinary retention and gross hematuria.  He had urinary retention first and had gross hematuria after Shaffer catheter placement.  He started tamsulosin and finasteride this admission.  He is on continuous bladder irrigation and being hand irrigated as needed.  The CT scan showed bilateral renal cysts and bilateral small nonobstructing renal stones and a clot within the bladder.  The urine culture was negative.    The gross hematuria has nearly resolved.  We will try to wean off the CBI.     Plan: Wean down CBI as tolerated.  Conservative management of the renal cysts and nonobstructing renal stones.  Continue tamsulosin and finasteride.  He may go home when the irrigation is weaned off.    Principal Problem:    Acute urinary retention  Active Problems:    HTN (hypertension)    Osteoporosis    Paroxysmal atrial fibrillation (CMS/HCC)    Hematuria          Plan for disposition:Where: home and When:  1-2 days    Edgar Griffin MD  09/09/18  9:04 AM

## 2018-09-10 PROCEDURE — 25010000002 CEFTRIAXONE PER 250 MG: Performed by: INTERNAL MEDICINE

## 2018-09-10 PROCEDURE — 99233 SBSQ HOSP IP/OBS HIGH 50: CPT | Performed by: INTERNAL MEDICINE

## 2018-09-10 RX ORDER — SACCHAROMYCES BOULARDII 250 MG
250 CAPSULE ORAL 2 TIMES DAILY
Status: DISCONTINUED | OUTPATIENT
Start: 2018-09-10 | End: 2018-09-11 | Stop reason: HOSPADM

## 2018-09-10 RX ADMIN — Medication 250 MG: at 08:22

## 2018-09-10 RX ADMIN — LISINOPRIL: 20 TABLET ORAL at 08:15

## 2018-09-10 RX ADMIN — ACETAMINOPHEN 650 MG: 325 TABLET ORAL at 20:30

## 2018-09-10 RX ADMIN — TAMSULOSIN HYDROCHLORIDE 0.4 MG: 0.4 CAPSULE ORAL at 08:15

## 2018-09-10 RX ADMIN — ACETAMINOPHEN 650 MG: 325 TABLET ORAL at 12:11

## 2018-09-10 RX ADMIN — SODIUM CHLORIDE 100 ML/HR: 9 INJECTION, SOLUTION INTRAVENOUS at 13:39

## 2018-09-10 RX ADMIN — CEFTRIAXONE SODIUM 1 G: 1 INJECTION, SOLUTION INTRAVENOUS at 17:32

## 2018-09-10 RX ADMIN — ACETAMINOPHEN 650 MG: 325 TABLET ORAL at 08:15

## 2018-09-10 RX ADMIN — Medication 250 MG: at 20:30

## 2018-09-10 RX ADMIN — ACETAMINOPHEN 650 MG: 325 TABLET ORAL at 00:29

## 2018-09-10 RX ADMIN — FINASTERIDE 5 MG: 5 TABLET, FILM COATED ORAL at 08:15

## 2018-09-10 RX ADMIN — TRIAMTERENE AND HYDROCHLOROTHIAZIDE 1 TABLET: 37.5; 25 TABLET ORAL at 08:15

## 2018-09-10 NOTE — PROGRESS NOTES
Continued Stay Note  [facility]     Patient Name: Sandip Feliz  MRN: [id number]  Today's Date: 9/10/2018    Admit Date: 9/6/2018          Discharge Plan     Row Name 09/10/18 1217       Plan    Plan Home with wife's assistance    Patient/Family in Agreement with Plan yes    Plan Comments Followed up with Mr. Feliz and his wife at the bedside for dischrge planning.  Discussed Mr. Feliz in unit rounds and Dr. Rojo said that patient will likely be discontinued from bladder irrigations.  If Mr. Feliz is discharged with a christianson, he was agreeable to having home health for assistance.    Mrs. Feliz requested that CM contact Dr. Mccallum's office about a steroid injection that she said is scheduled for this coming Wednesday.  Dr. Mccallum's office requested that Ms. Feliz call Blanquita at their office after 1pm today.   given information at the bedside and she will follow up.    CM will continue to follow for discharge needs.    Final Discharge Disposition Code 01 - home or self-care              Discharge Codes    No documentation.       Expected Discharge Date and Time     Expected Discharge Date Expected Discharge Time    Sep 11, 2018             Rosa Maria Garcia

## 2018-09-10 NOTE — PLAN OF CARE
Problem: Patient Care Overview  Goal: Plan of Care Review  Outcome: Ongoing (interventions implemented as appropriate)   09/10/18 0256   Coping/Psychosocial   Plan of Care Reviewed With patient   Plan of Care Review   Progress improving   OTHER   Outcome Summary CBI continued at slow rate. No further clots seen. No   09/10/18 0256   Coping/Psychosocial   Plan of Care Reviewed With patient   Plan of Care Review   Progress improving   OTHER   Outcome Summary CBI continued at slow rate. No further clots seen. No pain. VS WNL.    pain. VS WNL.

## 2018-09-10 NOTE — PLAN OF CARE
Problem: Patient Care Overview  Goal: Plan of Care Review  Outcome: Ongoing (interventions implemented as appropriate)   09/10/18 9122   Coping/Psychosocial   Plan of Care Reviewed With patient   Plan of Care Review   Progress improving   OTHER   Outcome Summary CBI turned off, pt tolerating well; urine clear yellow; christianson remains in place. PRN tylenol given for joint pain. Pt A&Ox4; pleasant, VSS, sinus arrythmia on monitor. Urology still needs to see for the day, d/c soon.

## 2018-09-10 NOTE — PROGRESS NOTES
[facility] Medicine Services  PROGRESS NOTE    Patient Name: Sandip Feliz  : 1931  MRN: [id number]    Date of Admission: 2018  Length of Stay: 4  Primary Care Physician: Kelly Robison MD    Subjective   Subjective     CC: f/u hematuria    HPI: Up in chair with wife at bedside. Doing well. No complaints. Denies pain.    Review of Systems  Gen- No fevers, chills  CV- No chest pain, palpitations  Resp- No cough, dyspnea  GI- No N/V/D, abd pain    Otherwise ROS is negative except as mentioned in the HPI.    Objective   Objective     Vital Signs:   Temp:  [97.4 °F (36.3 °C)-98.3 °F (36.8 °C)] 98.3 °F (36.8 °C)  Heart Rate:  [] 92  Resp:  [16-18] 16  BP: (148-161)/(80-95) 148/80        Physical Exam:  Constitutional: No acute distress, awake, alert  HENT: NCAT, mucous membranes moist  Respiratory: Clear to auscultation bilaterally, respiratory effort normal   Cardiovascular: RRR, no murmurs, rubs, or gallops, palpable pedal pulses bilaterally  Gastrointestinal: Positive bowel sounds, soft, nontender, nondistended  : Shaffer in place with mostly clear urine, CBI going  Musculoskeletal: No bilateral ankle edema  Psychiatric: Appropriate affect, cooperative  Neurologic: Oriented x 3, strength symmetric in all extremities, Cranial Nerves grossly intact to confrontation, speech clear  Skin: No rashes    Results Reviewed:  I have personally reviewed current lab, radiology, and data and agree.      Results from last 7 days  Lab Units 18  0647 18  0718  1409   WBC 10*3/mm3 8.85 9.40 7.23   HEMOGLOBIN g/dL 9.9* 10.2* 10.8*   HEMATOCRIT % 30.9* 32.6* 34.7*   PLATELETS 10*3/mm3 316 279 335       Results from last 7 days  Lab Units 18  0647 18  0703 18  1409   SODIUM mmol/L  --  140 138   POTASSIUM mmol/L 3.6 3.4* 4.4   CHLORIDE mmol/L  --  109 106   CO2 mmol/L  --  23.0 24.0   BUN mg/dL  --  18 24*   CREATININE mg/dL  --  0.88 1.21  "  GLUCOSE mg/dL  --  111* 95   CALCIUM mg/dL  --  8.5* 9.6   ALT (SGPT) U/L  --   --  14   AST (SGOT) U/L  --   --  24     Estimated Creatinine Clearance: 56.9 mL/min (by C-G formula based on SCr of 0.88 mg/dL).  No results found for: BNP    Microbiology Results Abnormal     Procedure Component Value - Date/Time    Urine Culture - Urine, Urine, Clean Catch [[id number]]  (Normal) Collected:  09/06/18 1641    Lab Status:  Final result Specimen:  Urine from Urine, Catheter Updated:  09/08/18 0757     Urine Culture No growth at 2 days        CT A/P personally reviewed with thick bladder wall. Agree with interpretation.   Imaging Results (last 24 hours)     Procedure Component Value Units Date/Time    CT Abdomen Pelvis With & Without Contrast [[id number]] Collected:  09/07/18 1949     Updated:  09/09/18 2248    Narrative:       EXAMINATION: CT ABDOMEN PELVIS W/WO CONTRAST - 9/7/2018      INDICATION: R33.8-Other retention of urine; R10.2-Pelvic and perineal  pain; R31.0-Gross hematuria.     TECHNIQUE: 5 mm unenhanced, arterial venous and delayed venous phase  images through the abdomen and pelvis. The radiation dose reduction  device was turned on for each scan per the ALARA (As Low as Reasonably  Achievable) protocol.     COMPARISON: None.     FINDINGS: Patient history indicates acute urinary retention, outside CT  study with hyperdistention of the bladder, abnormal outside MRI with  left kidney \"mass,\" history of hematuria.     On the unenhanced CT, all of the patient's discrete exophytic renal  \"masses\" measure water density. There are a few small intrinsic renal  lesions that may represent cysts but are subcentimeter in size. There is  a 4 mm nonobstructing left lower pole renal calculus and a 3 mm right  midpole calculus. There is mild fullness of both renal collecting  systems and proximal ureters.     Postcontrast images show no evidence of enhancement of any of the  patient's renal lesions either immediately or on " "delayed imaging.  Delayed images show no contrast opacification of the ureters, apparently  due to mild obstructive uropathy at the level the bladder. Ureters are  dilated all the way down to the level of the right and left UVJ. No  ureteral calculus is seen on either the right or left. The bladder is  diffusely thick-walled and irregular and contains a stringy and  semilunar shaped \"mass\" with a few air bubbles most likely representing  clot in the dependent portion near the bladder dome. This does not have  the typical polypoid appearance of bladder mass and shows no contrast  enhancement. Moreover it measures typical value for clot at 68  Hounsfield units. A small amount of air is seen in the bladder from  patient's catheter.     Elsewhere at the level of this scan, there is minimal atelectasis in the  lower lungs. No significant abnormalities are noted of the liver,  spleen, pancreas, adrenal glands, or gallbladder. No free air, ascites,  or adenopathy is seen. Bowel loops are normal in caliber and grossly  normal in appearance. Bony structures appear intact..       Impression:       1. Multiple bilateral renal cysts, and a couple of nonobstructing  bilateral renal calculi. No evidence of renal neoplasm.  2. Mild bilateral obstructive uropathy at the level of the right and  left UVJ apparently due to the patient's diffuse bladder wall  thickening.  3. Nonenhancing approximately 3 cm clot in the bladder lumen. No  evidence of discrete polypoid bladder mass.  4. No other evidence of acute intra-abdominal or intrapelvic disease  elsewhere.     DICTATED:   9/7/2018  EDITED/ls :   9/7/2018      This report was finalized on 9/9/2018 10:46 PM by DR. Ace Mancia MD.           Results for orders placed in visit on 05/24/16   SCANNED - ECHOCARDIOGRAM       I have reviewed the medications.      amLODIPine-lisinopril 5-20 mg combo dose  Oral Q24H   ceftriaxone 1 g Intravenous Q24H   finasteride 5 mg Oral Daily "   saccharomyces boulardii 250 mg Oral BID   tamsulosin 0.4 mg Oral Daily   triamterene-hydrochlorothiazide 1 tablet Oral Daily         Assessment/Plan   Assessment / Plan     Hospital Problem List     * (Principal)Acute urinary retention    HTN (hypertension)    Osteoporosis    Paroxysmal atrial fibrillation (CMS/HCC)    Hematuria          Brief Hospital Course to date:  Sandip Feliz is a 87 y.o. male with past medical history significant for hypertension, paroxysmal atrial fibrillation on Xarelto.  Patient was admitted for hematuria and urinary retention.     Assessment & Plan:  --Hematuria improving w/ CBI and holding xarelto. Urology following. Home off xarelto when okay with them.  --Continue IV rocephin. Can switch to PO at d/c.  --A-viri, was on Xarelto, which is currently on hold. Will continue to hold until this situation has been resolved. Have d/w wife.  --Has mild anemia related to this. Monitor.     DVT Prophylaxis:  SCD    CODE STATUS:   Code Status and Medical Interventions:   Ordered at: 09/06/18 0068     Level Of Support Discussed With:    Patient     Code Status:    CPR     Medical Interventions (Level of Support Prior to Arrest):    Full       Disposition: I expect the patient to be discharged home in 1-2 days when okay with urology.      Electronically signed by Fatemeh Rojo II, DO, 09/10/18, 1:53 PM.

## 2018-09-11 VITALS
DIASTOLIC BLOOD PRESSURE: 76 MMHG | TEMPERATURE: 98.1 F | RESPIRATION RATE: 16 BRPM | HEART RATE: 99 BPM | OXYGEN SATURATION: 94 % | HEIGHT: 65 IN | BODY MASS INDEX: 24.99 KG/M2 | WEIGHT: 150 LBS | SYSTOLIC BLOOD PRESSURE: 138 MMHG

## 2018-09-11 LAB
BACTERIA UR QL AUTO: ABNORMAL /HPF
BILIRUB UR QL STRIP: NEGATIVE
CLARITY UR: CLEAR
COLOR UR: YELLOW
GLUCOSE UR STRIP-MCNC: ABNORMAL MG/DL
HGB UR QL STRIP.AUTO: ABNORMAL
HYALINE CASTS UR QL AUTO: ABNORMAL /LPF
KETONES UR QL STRIP: NEGATIVE
LEUKOCYTE ESTERASE UR QL STRIP.AUTO: ABNORMAL
NITRITE UR QL STRIP: NEGATIVE
PH UR STRIP.AUTO: 6.5 [PH] (ref 5–8)
PROT UR QL STRIP: ABNORMAL
RBC # UR: ABNORMAL /HPF
REF LAB TEST METHOD: ABNORMAL
SP GR UR STRIP: 1.01 (ref 1–1.03)
SQUAMOUS #/AREA URNS HPF: ABNORMAL /HPF
UROBILINOGEN UR QL STRIP: ABNORMAL
WBC UR QL AUTO: ABNORMAL /HPF

## 2018-09-11 PROCEDURE — 87086 URINE CULTURE/COLONY COUNT: CPT | Performed by: UROLOGY

## 2018-09-11 PROCEDURE — 81001 URINALYSIS AUTO W/SCOPE: CPT | Performed by: UROLOGY

## 2018-09-11 PROCEDURE — 99239 HOSP IP/OBS DSCHRG MGMT >30: CPT | Performed by: INTERNAL MEDICINE

## 2018-09-11 RX ORDER — TAMSULOSIN HYDROCHLORIDE 0.4 MG/1
0.4 CAPSULE ORAL DAILY
Qty: 30 CAPSULE | Refills: 0 | Status: SHIPPED | OUTPATIENT
Start: 2018-09-12 | End: 2018-12-06 | Stop reason: HOSPADM

## 2018-09-11 RX ORDER — FINASTERIDE 5 MG/1
5 TABLET, FILM COATED ORAL DAILY
Qty: 30 TABLET | Refills: 0 | Status: SHIPPED | OUTPATIENT
Start: 2018-09-12

## 2018-09-11 RX ORDER — SACCHAROMYCES BOULARDII 250 MG
250 CAPSULE ORAL 2 TIMES DAILY
Qty: 30 CAPSULE | Refills: 0 | Status: SHIPPED | OUTPATIENT
Start: 2018-09-11 | End: 2019-01-01

## 2018-09-11 RX ADMIN — LISINOPRIL: 20 TABLET ORAL at 08:56

## 2018-09-11 RX ADMIN — ACETAMINOPHEN 650 MG: 325 TABLET ORAL at 08:57

## 2018-09-11 RX ADMIN — TAMSULOSIN HYDROCHLORIDE 0.4 MG: 0.4 CAPSULE ORAL at 08:56

## 2018-09-11 RX ADMIN — SODIUM CHLORIDE 100 ML/HR: 9 INJECTION, SOLUTION INTRAVENOUS at 00:56

## 2018-09-11 RX ADMIN — TRIAMTERENE AND HYDROCHLOROTHIAZIDE 1 TABLET: 37.5; 25 TABLET ORAL at 08:56

## 2018-09-11 RX ADMIN — ACETAMINOPHEN 650 MG: 325 TABLET ORAL at 03:24

## 2018-09-11 RX ADMIN — FINASTERIDE 5 MG: 5 TABLET, FILM COATED ORAL at 08:57

## 2018-09-11 RX ADMIN — Medication 250 MG: at 08:56

## 2018-09-11 NOTE — DISCHARGE SUMMARY
[facility] Medicine Services  DISCHARGE SUMMARY    Patient Name: Sandip Feliz  : 1931  MRN: [id number]    Date of Admission: 2018  Date of Discharge: 2018  Primary Care Physician: Kelly Robison MD    Consults     Date and Time Order Name Status Description    2018 0030 Inpatient Urology Consult          Hospital Course     Presenting Problem:   Acute urinary retention [R33.8]    Active Hospital Problems    Diagnosis Date Noted   • **Acute urinary retention [R33.8] 2018   • Hematuria [R31.9] 2018   • Paroxysmal atrial fibrillation (CMS/HCC) [I48.0] 2017   • HTN (hypertension) [I10] 2017   • Osteoporosis [M81.0] 2017      Resolved Hospital Problems    Diagnosis Date Noted Date Resolved   No resolved problems to display.          Hospital Course:  Sandip Feliz is a 87 y.o. male presenting from home with hematuria and urinary retention.    Acute urinary retention with hematuria: 88 y/o male presenting with above. Christianson catheter was placed on arrival and patient was initiated on continuous bladder irrigation. CT A/P was performed which did show mild bladder wall thickening and mild hydronephrosis. Urology was consulted who followed the patient throughout his stay. His hematuria did resolve with stopping his xarelto and with bladder decompression. Voiding trial was attempted but patient unable to avoid spontaneously. Patient will be discharged with christianson catheter in place with leg bag along with continuation of tamsulosin and finasteride. This was discussed with urology prior to d/c. He will follow up with Dr. Rausch in 1 week. He will remain off of xarelto until told otherwise. The risks of this were discussed the patient prior to d/c.    Discharge Follow Up Recommendations for labs/diagnostics:   Dr. Rausch in 1 week with voiding trial.    Day of Discharge     HPI: Sitting up in bed. Family at bedside. Christianson pulled at 1:00  am and has been unable to spontaneously void. Otherwise he feels well.     Review of Systems  Gen- No fevers, chills  CV- No chest pain, palpitations  Resp- No cough, dyspnea  GI- No N/V/D, abd pain    Otherwise ROS is negative except as mentioned in the HPI.    Vital Signs:   Temp:  [97.8 °F (36.6 °C)-98.1 °F (36.7 °C)] 98.1 °F (36.7 °C)  Heart Rate:  [73-99] 99  Resp:  [16] 16  BP: (132-143)/(62-76) 138/76     Physical Exam:  Constitutional: No acute distress, awake, alert, looks better  HENT: NCAT, mucous membranes moist  Respiratory: Clear to auscultation bilaterally, respiratory effort normal   Cardiovascular: RRR, no murmurs, rubs, or gallops, palpable pedal pulses bilaterally  Gastrointestinal: Positive bowel sounds, soft, nontender, nondistended  Musculoskeletal: No bilateral ankle edema  Psychiatric: Appropriate affect, cooperative  Neurologic: Oriented x 3, strength symmetric in all extremities, Cranial Nerves grossly intact to confrontation, speech clear  Skin: No rashes    Pertinent  and/or Most Recent Results       Results from last 7 days  Lab Units 09/09/18  0647 09/07/18  0703 09/06/18  1409   WBC 10*3/mm3 8.85 9.40 7.23   HEMOGLOBIN g/dL 9.9* 10.2* 10.8*   HEMATOCRIT % 30.9* 32.6* 34.7*   PLATELETS 10*3/mm3 316 279 335   SODIUM mmol/L  --  140 138   POTASSIUM mmol/L 3.6 3.4* 4.4   CHLORIDE mmol/L  --  109 106   CO2 mmol/L  --  23.0 24.0   BUN mg/dL  --  18 24*   CREATININE mg/dL  --  0.88 1.21   GLUCOSE mg/dL  --  111* 95   CALCIUM mg/dL  --  8.5* 9.6       Results from last 7 days  Lab Units 09/06/18  1409   BILIRUBIN mg/dL 0.3   ALK PHOS U/L 74   ALT (SGPT) U/L 14   AST (SGOT) U/L 24           Invalid input(s): TG, LDLCALC, LDLREALC      Brief Urine Lab Results  (Last result in the past 365 days)      Color   Clarity   Blood   Leuk Est   Nitrite   Protein   CREAT   Urine HCG        09/11/18 0407 Yellow Clear Moderate (2+)(A) Small (1+)(A) Negative 30 mg/dL (1+)(A)               Microbiology  "Results Abnormal     Procedure Component Value - Date/Time    Urine Culture - Urine, Urine, Clean Catch [[id number]]  (Normal) Collected:  09/06/18 1641    Lab Status:  Final result Specimen:  Urine from Urine, Catheter Updated:  09/08/18 0757     Urine Culture No growth at 2 days          Imaging Results (all)     Procedure Component Value Units Date/Time    CT Abdomen Pelvis With & Without Contrast [[id number]] Collected:  09/07/18 1949     Updated:  09/09/18 2248    Narrative:       EXAMINATION: CT ABDOMEN PELVIS W/WO CONTRAST - 9/7/2018      INDICATION: R33.8-Other retention of urine; R10.2-Pelvic and perineal  pain; R31.0-Gross hematuria.     TECHNIQUE: 5 mm unenhanced, arterial venous and delayed venous phase  images through the abdomen and pelvis. The radiation dose reduction  device was turned on for each scan per the ALARA (As Low as Reasonably  Achievable) protocol.     COMPARISON: None.     FINDINGS: Patient history indicates acute urinary retention, outside CT  study with hyperdistention of the bladder, abnormal outside MRI with  left kidney \"mass,\" history of hematuria.     On the unenhanced CT, all of the patient's discrete exophytic renal  \"masses\" measure water density. There are a few small intrinsic renal  lesions that may represent cysts but are subcentimeter in size. There is  a 4 mm nonobstructing left lower pole renal calculus and a 3 mm right  midpole calculus. There is mild fullness of both renal collecting  systems and proximal ureters.     Postcontrast images show no evidence of enhancement of any of the  patient's renal lesions either immediately or on delayed imaging.  Delayed images show no contrast opacification of the ureters, apparently  due to mild obstructive uropathy at the level the bladder. Ureters are  dilated all the way down to the level of the right and left UVJ. No  ureteral calculus is seen on either the right or left. The bladder is  diffusely thick-walled and irregular and " "contains a stringy and  semilunar shaped \"mass\" with a few air bubbles most likely representing  clot in the dependent portion near the bladder dome. This does not have  the typical polypoid appearance of bladder mass and shows no contrast  enhancement. Moreover it measures typical value for clot at 68  Hounsfield units. A small amount of air is seen in the bladder from  patient's catheter.     Elsewhere at the level of this scan, there is minimal atelectasis in the  lower lungs. No significant abnormalities are noted of the liver,  spleen, pancreas, adrenal glands, or gallbladder. No free air, ascites,  or adenopathy is seen. Bowel loops are normal in caliber and grossly  normal in appearance. Bony structures appear intact..       Impression:       1. Multiple bilateral renal cysts, and a couple of nonobstructing  bilateral renal calculi. No evidence of renal neoplasm.  2. Mild bilateral obstructive uropathy at the level of the right and  left UVJ apparently due to the patient's diffuse bladder wall  thickening.  3. Nonenhancing approximately 3 cm clot in the bladder lumen. No  evidence of discrete polypoid bladder mass.  4. No other evidence of acute intra-abdominal or intrapelvic disease  elsewhere.     DICTATED:   9/7/2018  EDITED/ls :   9/7/2018      This report was finalized on 9/9/2018 10:46 PM by DR. Ace Mancia MD.                       Results for orders placed in visit on 05/24/16   SCANNED - ECHOCARDIOGRAM        Order Current Status    Urine Culture - Urine, In process        Discharge Details        Discharge Medications      New Medications      Instructions Start Date   finasteride 5 MG tablet  Commonly known as:  PROSCAR   5 mg, Oral, Daily      saccharomyces boulardii 250 MG capsule  Commonly known as:  FLORASTOR   250 mg, Oral, 2 Times Daily      tamsulosin 0.4 MG capsule 24 hr capsule  Commonly known as:  FLOMAX   0.4 mg, Oral, Daily         Continue These Medications      Instructions Start Date "   amLODIPine-benazepril 5-20 MG per capsule  Commonly known as:  LOTREL 5-20   1 capsule, Oral, Daily      PROLIA 60 MG/ML solution syringe  Generic drug:  denosumab   60 mg, Subcutaneous, Every 6 Months      triamterene-hydrochlorothiazide 37.5-25 MG per tablet  Commonly known as:  MAXZIDE-25   1 tablet, Oral, Daily         Stop These Medications    XARELTO 10 MG tablet  Generic drug:  rivaroxaban              Discharge Disposition:  Home or Self Care    Discharge Diet:  Diet Instructions     Regular diet                 Discharge Activity:   Activity Instructions     Activity as tolerated                 Code Status/Level of Support:  Code Status and Medical Interventions:   Ordered at: 09/06/18 2234     Level Of Support Discussed With:    Patient     Code Status:    CPR     Medical Interventions (Level of Support Prior to Arrest):    Full       Future Appointments  Date Time Provider Department Center   9/28/2018 11:45 AM Nino Felipe MD [facility] None   10/26/2018 9:00 AM  [facility]  [facility]       Additional Instructions for the Follow-ups that You Need to Schedule     Discharge Follow-up with PCP    As directed      Currently Documented PCP:  Kelly Robison MD  PCP Phone Number:  [phone]    Follow Up Details:  1-2 week hospital follow up         Discharge Follow-up with Specialty: Dr. Rausch; 1 Week    As directed      Specialty:  Dr. Rausch    Follow Up:  1 Week               Time Spent on Discharge: 42 minutes    Electronically signed by Fatemeh Rojo II, DO, 09/11/18, 10:57 AM.

## 2018-09-11 NOTE — PLAN OF CARE
Problem: Patient Care Overview  Goal: Plan of Care Review  Outcome: Outcome(s) achieved Date Met: 09/11/18 09/11/18 1049   Coping/Psychosocial   Plan of Care Reviewed With patient   Plan of Care Review   Progress improving   OTHER   Outcome Summary patient is being discharged home     Goal: Individualization and Mutuality  Outcome: Outcome(s) achieved Date Met: 09/11/18    Goal: Discharge Needs Assessment  Outcome: Outcome(s) achieved Date Met: 09/11/18    Goal: Interprofessional Rounds/Family Conf  Outcome: Outcome(s) achieved Date Met: 09/11/18

## 2018-09-11 NOTE — PROGRESS NOTES
Case Management Discharge Note    Final Note: Followed up with Mr. Feliz and his wife, Angélica, at the bedside for discharge planning.  Mr. Feliz is being discharged to home today.  Discussed home health and patient and wife said that they did not think they would need home health for assistance with the christianson.  Denied any DME needs also.  Mr. Feliz is being transported home by his wife by personal vehicle.      Destination     No service has been selected for the patient.      Durable Medical Equipment     No service has been selected for the patient.      Dialysis/Infusion     No service has been selected for the patient.      Home Medical Care     No service has been selected for the patient.      Social Care     No service has been selected for the patient.             Final Discharge Disposition Code: 01 - home or self-care

## 2018-09-11 NOTE — PROGRESS NOTES
Patient doing well this evening with no complaints  CBI is off with clear urine    Plan  Voiding trial in am if still clear  Home on tamsulosin and finasteride  F/u as outpatient in 1 week for PVR check   Follow mild hydro as outpatient with renal US, likely will resolve with MASON treatment  Follow nonobstructing stones/cysts conservatively as outpatient

## 2018-09-12 ENCOUNTER — EPISODE CHANGES (OUTPATIENT)
Dept: CASE MANAGEMENT | Facility: OTHER | Age: 83
End: 2018-09-12

## 2018-09-13 LAB — BACTERIA SPEC AEROBE CULT: NORMAL

## 2018-09-18 ENCOUNTER — EPISODE CHANGES (OUTPATIENT)
Dept: CASE MANAGEMENT | Facility: OTHER | Age: 83
End: 2018-09-18

## 2018-09-21 ENCOUNTER — EPISODE CHANGES (OUTPATIENT)
Dept: CASE MANAGEMENT | Facility: OTHER | Age: 83
End: 2018-09-21

## 2018-09-26 ENCOUNTER — EPISODE CHANGES (OUTPATIENT)
Dept: CASE MANAGEMENT | Facility: OTHER | Age: 83
End: 2018-09-26

## 2018-09-26 ENCOUNTER — PATIENT OUTREACH (OUTPATIENT)
Dept: CASE MANAGEMENT | Facility: OTHER | Age: 83
End: 2018-09-26

## 2018-09-26 NOTE — OUTREACH NOTE
Care Management Plan 9/26/2018   Lifestyle Goals Routine follow-up with doctor(s)   Barriers Disease education   Self Management Medication Adherence   Annual Wellness Visit:  Patient Has Completed   Care Gaps Addressed Flu Shot;Other (See Comment)   Care Gaps Addressed Medicare Annual Wellness Visit   Specific Disease Process Teaching Heart Disease   Does patient have depression diagnosis? No   Advanced Directives: (No Data)   Advanced Directives: Wife declines information at this time   Ed Visits past 12 months: None   Hospitalizations past 12 months 1     The main concerns and/or symptoms the patient would like to address are: Talked with patient's wife. She states patient lives with spouse; receiving assistance as needed from wife and niece as needed. She states patient is compliant with medication and medical appointments; seeing PCP yesterday and doing well. She reports no difficulties of SOB; chest pain; or symptoms of UTI. Conversation brief.    Education/instruction provided by Care Coordinator: Reviewed with patient's wife 24/ 7 Nurse Line Telephone number; [place] contact information; Advance Directives; MWV; and Care Advising program. Patient's wife verbalized understanding. MWV completed. No further questions or concerns voiced at this time.     Follow Up Outreach Due: Follow up as needed.     Beverley More RN

## 2018-09-28 ENCOUNTER — OFFICE VISIT (OUTPATIENT)
Dept: CARDIOLOGY | Facility: CLINIC | Age: 83
End: 2018-09-28

## 2018-09-28 ENCOUNTER — EPISODE CHANGES (OUTPATIENT)
Dept: CASE MANAGEMENT | Facility: OTHER | Age: 83
End: 2018-09-28

## 2018-09-28 VITALS
HEIGHT: 65 IN | OXYGEN SATURATION: 97 % | DIASTOLIC BLOOD PRESSURE: 52 MMHG | BODY MASS INDEX: 23.43 KG/M2 | SYSTOLIC BLOOD PRESSURE: 96 MMHG | WEIGHT: 140.6 LBS | HEART RATE: 77 BPM

## 2018-09-28 DIAGNOSIS — I10 ESSENTIAL HYPERTENSION: ICD-10-CM

## 2018-09-28 DIAGNOSIS — I48.0 PAROXYSMAL ATRIAL FIBRILLATION (HCC): Primary | ICD-10-CM

## 2018-09-28 PROCEDURE — 99213 OFFICE O/P EST LOW 20 MIN: CPT | Performed by: INTERNAL MEDICINE

## 2018-09-28 RX ORDER — RANITIDINE 300 MG/1
300 TABLET ORAL AS NEEDED
COMMUNITY
End: 2019-01-01

## 2018-09-28 NOTE — PROGRESS NOTES
Sandip Feliz  8/24/1931  [phone]    VISIT DATE: 9/28/2018     PCP: Kelly Robison MD  [address] [place] [address]    IDENTIFICATION: A 87 y.o. male retired from [employer].         Chief Complaint   Patient presents with   • Follow-up      PROBLEM LIST:    1. Atrial fibrillation:    1. CHADS-VASc = 3.  2. Currently rate controlled.    2. Hypertension.    3. Hyponatremia:  4. Present on admission with no previous history.    5. GERD.    6. Osteoporosis.    7. Urinary obstruction indwelling catheter 9 /18 followed per Dr. Rausch    Chief Complaint   Patient presents with   • Atrial Fibrillation       Allergies  No Known Allergies    Current Medications    Current Outpatient Prescriptions:   •  amLODIPine-benazepril (LOTREL 5-20) 5-20 MG per capsule, Take 1 capsule by mouth Daily., Disp: , Rfl:   •  denosumab (PROLIA) 60 MG/ML solution syringe, Inject 60 mg under the skin every 6 (six) months., Disp: , Rfl:   •  finasteride (PROSCAR) 5 MG tablet, Take 1 tablet by mouth Daily., Disp: 30 tablet, Rfl: 0  •  raNITIdine (ZANTAC) 300 MG tablet, Take 300 mg by mouth As Needed for Indigestion or Heartburn., Disp: , Rfl:   •  saccharomyces boulardii (FLORASTOR) 250 MG capsule, Take 1 capsule by mouth 2 (Two) Times a Day. (Patient taking differently: Take 250 mg by mouth As Needed.), Disp: 30 capsule, Rfl: 0  •  tamsulosin (FLOMAX) 0.4 MG capsule 24 hr capsule, Take 1 capsule by mouth Daily., Disp: 30 capsule, Rfl: 0  •  triamterene-hydrochlorothiazide (MAXZIDE-25) 37.5-25 MG per tablet, Take 1 tablet by mouth Daily., Disp: , Rfl:     History of Present Illness     Pt denies any chest pain, dyspnea, dyspnea on exertion, orthopnea, PND, palpitations, lower extremity edema.      He was recently hospitalized at [facility] for hematuria and urinary retention.  He was found that he had mild bladder wall thickening and mild hydronephrosis, his Xarelto was held and he is to follow-up with Dr. Rausch  "outpatient.      ROS:  All systems have been reviewed and are negative with the exception of those mentioned in the HPI.    OBJECTIVE:  Vitals:    09/28/18 1153   BP: 96/52   BP Location: Right arm   Patient Position: Sitting   Pulse: 77   SpO2: 97%   Weight: 63.8 kg (140 lb 9.6 oz)   Height: 165.1 cm (65\")     Physical Exam   Constitutional: He appears well-developed and well-nourished.   Neck: Normal range of motion. Neck supple. No hepatojugular reflux and no JVD present. Carotid bruit is not present. No tracheal deviation present. No thyromegaly present.   Cardiovascular: Normal rate, regular rhythm, S1 normal, S2 normal, intact distal pulses and normal pulses.  PMI is not displaced.  Exam reveals no gallop, no distant heart sounds, no friction rub, no midsystolic click and no opening snap.    No murmur heard.  Pulses:       Radial pulses are 2+ on the right side, and 2+ on the left side.        Dorsalis pedis pulses are 2+ on the right side, and 2+ on the left side.        Posterior tibial pulses are 2+ on the right side, and 2+ on the left side.   Pulmonary/Chest: Effort normal and breath sounds normal. He has no wheezes. He has no rales.   Abdominal: Soft. Bowel sounds are normal. He exhibits no mass. There is no tenderness. There is no guarding.       Diagnostic Data:  Procedures      ASSESSMENT:   Diagnosis Plan   1. Paroxysmal atrial fibrillation (CMS/HCC)     2. Essential hypertension         PLAN:  Paf off Xarelto at current    htn controlled.  And follow blood pressures in the weekend and contact us may need to decrease and a hypertensive    Ricki, Kelly Charles MD, thank you for referring Mr. Feliz for evaluation.  I have forwarded my electronically generated recommendations to you for review.  Please do not hesitate to call with any questions.    Scribed for Nino Felipe MD by Jimena Parr PA-C. 9/28/2018  12:10 PM  INino MD, personally performed the services described in this " documentation as scribed by the above named individual in my presence, and it is both accurate and complete.  9/28/2018  12:10 PM    Nino Felipe MD, FACC

## 2018-10-12 ENCOUNTER — EPISODE CHANGES (OUTPATIENT)
Dept: CASE MANAGEMENT | Facility: OTHER | Age: 83
End: 2018-10-12

## 2018-10-22 ENCOUNTER — APPOINTMENT (OUTPATIENT)
Dept: PREADMISSION TESTING | Facility: HOSPITAL | Age: 83
End: 2018-10-22

## 2018-10-22 ENCOUNTER — HOSPITAL ENCOUNTER (OUTPATIENT)
Dept: GENERAL RADIOLOGY | Facility: HOSPITAL | Age: 83
Discharge: HOME | End: 2018-10-22
Admitting: UROLOGY

## 2018-10-22 VITALS — WEIGHT: 143.3 LBS | HEIGHT: 60 IN | BODY MASS INDEX: 28.13 KG/M2

## 2018-10-22 DIAGNOSIS — I10 HYPERTENSION, UNSPECIFIED TYPE: ICD-10-CM

## 2018-10-22 LAB
ANION GAP SERPL CALCULATED.3IONS-SCNC: 9 MMOL/L (ref 3–11)
BUN BLD-MCNC: 29 MG/DL (ref 9–23)
BUN/CREAT SERPL: 26.9 (ref 7–25)
CALCIUM SPEC-SCNC: 9.5 MG/DL (ref 8.7–10.4)
CHLORIDE SERPL-SCNC: 102 MMOL/L (ref 99–109)
CO2 SERPL-SCNC: 24 MMOL/L (ref 20–31)
CREAT BLD-MCNC: 1.08 MG/DL (ref 0.6–1.3)
DEPRECATED RDW RBC AUTO: 47 FL (ref 37–54)
ERYTHROCYTE [DISTWIDTH] IN BLOOD BY AUTOMATED COUNT: 15.1 % (ref 11.3–14.5)
GFR SERPL CREATININE-BSD FRML MDRD: 65 ML/MIN/1.73
GLUCOSE BLD-MCNC: 91 MG/DL (ref 70–100)
HCT VFR BLD AUTO: 35.5 % (ref 38.9–50.9)
HGB BLD-MCNC: 11.1 G/DL (ref 13.1–17.5)
MCH RBC QN AUTO: 26.4 PG (ref 27–31)
MCHC RBC AUTO-ENTMCNC: 31.3 G/DL (ref 32–36)
MCV RBC AUTO: 84.5 FL (ref 80–99)
PLATELET # BLD AUTO: 359 10*3/MM3 (ref 150–450)
PMV BLD AUTO: 9.4 FL (ref 6–12)
POTASSIUM BLD-SCNC: 4.7 MMOL/L (ref 3.5–5.5)
RBC # BLD AUTO: 4.2 10*6/MM3 (ref 4.2–5.76)
SODIUM BLD-SCNC: 135 MMOL/L (ref 132–146)
WBC NRBC COR # BLD: 9.68 10*3/MM3 (ref 3.5–10.8)

## 2018-10-22 PROCEDURE — 85027 COMPLETE CBC AUTOMATED: CPT | Performed by: UROLOGY

## 2018-10-22 PROCEDURE — 80048 BASIC METABOLIC PNL TOTAL CA: CPT | Performed by: UROLOGY

## 2018-10-22 PROCEDURE — 36415 COLL VENOUS BLD VENIPUNCTURE: CPT

## 2018-10-22 PROCEDURE — 71046 X-RAY EXAM CHEST 2 VIEWS: CPT

## 2018-10-22 RX ORDER — CHOLECALCIFEROL (VITAMIN D3) 125 MCG
CAPSULE ORAL DAILY
COMMUNITY

## 2018-10-22 RX ORDER — LANOLIN ALCOHOL/MO/W.PET/CERES
1000 CREAM (GRAM) TOPICAL DAILY
COMMUNITY

## 2018-10-22 RX ORDER — SCOLOPAMINE TRANSDERMAL SYSTEM 1 MG/1
1 PATCH, EXTENDED RELEASE TRANSDERMAL CONTINUOUS
Status: CANCELLED | OUTPATIENT
Start: 2018-10-22 | End: 2018-10-25

## 2018-10-22 NOTE — DISCHARGE INSTRUCTIONS

## 2018-10-23 ENCOUNTER — INFUSION (OUTPATIENT)
Dept: ONCOLOGY | Facility: HOSPITAL | Age: 83
End: 2018-10-23

## 2018-10-23 VITALS
TEMPERATURE: 98.5 F | RESPIRATION RATE: 16 BRPM | DIASTOLIC BLOOD PRESSURE: 58 MMHG | SYSTOLIC BLOOD PRESSURE: 124 MMHG | HEART RATE: 89 BPM

## 2018-10-23 DIAGNOSIS — M81.0 OSTEOPOROSIS, UNSPECIFIED OSTEOPOROSIS TYPE, UNSPECIFIED PATHOLOGICAL FRACTURE PRESENCE: Primary | ICD-10-CM

## 2018-10-23 PROCEDURE — 25010000002 DENOSUMAB 60 MG/ML SOLUTION: Performed by: FAMILY MEDICINE

## 2018-10-23 PROCEDURE — 96372 THER/PROPH/DIAG INJ SC/IM: CPT

## 2018-10-23 RX ADMIN — DENOSUMAB 60 MG: 60 INJECTION SUBCUTANEOUS at 13:11

## 2018-10-25 ENCOUNTER — HOSPITAL ENCOUNTER (EMERGENCY)
Facility: HOSPITAL | Age: 83
Discharge: HOME | End: 2018-10-25
Attending: EMERGENCY MEDICINE | Admitting: EMERGENCY MEDICINE

## 2018-10-25 VITALS
SYSTOLIC BLOOD PRESSURE: 132 MMHG | DIASTOLIC BLOOD PRESSURE: 50 MMHG | OXYGEN SATURATION: 96 % | HEIGHT: 65 IN | WEIGHT: 144 LBS | RESPIRATION RATE: 20 BRPM | TEMPERATURE: 97.4 F | HEART RATE: 83 BPM | BODY MASS INDEX: 23.99 KG/M2

## 2018-10-25 DIAGNOSIS — Z97.8 PRESENCE OF INDWELLING FOLEY CATHETER: ICD-10-CM

## 2018-10-25 DIAGNOSIS — N48.1 BALANITIS: Primary | ICD-10-CM

## 2018-10-25 DIAGNOSIS — N39.0 URINARY TRACT INFECTION WITHOUT HEMATURIA, SITE UNSPECIFIED: ICD-10-CM

## 2018-10-25 LAB
BACTERIA UR QL AUTO: ABNORMAL /HPF
BILIRUB UR QL STRIP: NEGATIVE
CLARITY UR: CLEAR
COLOR UR: YELLOW
GLUCOSE UR STRIP-MCNC: NEGATIVE MG/DL
HGB UR QL STRIP.AUTO: NEGATIVE
HYALINE CASTS UR QL AUTO: ABNORMAL /LPF
KETONES UR QL STRIP: ABNORMAL
LEUKOCYTE ESTERASE UR QL STRIP.AUTO: ABNORMAL
NITRITE UR QL STRIP: POSITIVE
PH UR STRIP.AUTO: 5.5 [PH] (ref 5–8)
PROT UR QL STRIP: NEGATIVE
RBC # UR: ABNORMAL /HPF
REF LAB TEST METHOD: ABNORMAL
SP GR UR STRIP: 1.03 (ref 1–1.03)
SQUAMOUS #/AREA URNS HPF: ABNORMAL /HPF
UROBILINOGEN UR QL STRIP: ABNORMAL
WBC UR QL AUTO: ABNORMAL /HPF

## 2018-10-25 PROCEDURE — 81001 URINALYSIS AUTO W/SCOPE: CPT | Performed by: PHYSICIAN ASSISTANT

## 2018-10-25 PROCEDURE — 99283 EMERGENCY DEPT VISIT LOW MDM: CPT

## 2018-10-25 RX ORDER — CEFDINIR 300 MG/1
300 CAPSULE ORAL 2 TIMES DAILY
Qty: 10 CAPSULE | Refills: 0 | Status: SHIPPED | OUTPATIENT
Start: 2018-10-25 | End: 2018-12-06 | Stop reason: HOSPADM

## 2018-10-25 RX ORDER — NYSTATIN 100000 U/G
OINTMENT TOPICAL 2 TIMES DAILY
Qty: 30 G | Refills: 0 | Status: SHIPPED | OUTPATIENT
Start: 2018-10-25 | End: 2018-12-06 | Stop reason: HOSPADM

## 2018-10-25 NOTE — DISCHARGE INSTRUCTIONS
Return to emergency department immediately if any change or worsening of symptoms.  Follow-up with Dr. Velez as soon as possible, call tomorrow.

## 2018-10-25 NOTE — ED PROVIDER NOTES
Subjective   87-year-old male presents to emergency department with swelling to the foreskin and glans of his penis for the past 2-3 days.  He has an indwelling Shaffer catheter, for the past 8 weeks.  Is scheduled to have urological procedure 4 days from now, was advised to come to the emergency department for evaluation since he now has swelling and redness to the foreskin and glans.  Urine output has been normal.  No fevers chills or sweats denies pain.  No pain or discharge.    Past medical history reviewed.  Past surgical history reviewed.  Family history reviewed  Social history reviewed  Medication list reviewed.   Denies drug allergies        Illness   Location:  Per HPI  Quality:  Per HPI  Severity:  Moderate  Onset quality:  Gradual  Duration:  2 days  Timing:  Constant  Progression:  Unchanged  Chronicity:  New  Context:  Per HPI  Relieved by:  Per HPI  Worsened by:  Per HPI  Ineffective treatments:  Per HPI  Associated symptoms: no abdominal pain, no fever, no nausea and no vomiting        Review of Systems   Constitutional: Negative for fever.   Gastrointestinal: Negative for abdominal pain, nausea and vomiting.   Genitourinary: Positive for penile swelling. Negative for difficulty urinating, dysuria, flank pain, hematuria, scrotal swelling, testicular pain and urgency.   All other systems reviewed and are negative.      Past Medical History:   Diagnosis Date   • A-fib (CMS/HCC)    • Arthritis    • Shaffer catheter in place    • Hearing impairment    • Hearing loss     both ears hearing aides   • Heartburn    • Hypertension    • Melanoma (CMS/HCC)    • Wears dentures    • Wears dentures     partial bottom full at top   • Wears glasses    • Wears hearing aid        No Known Allergies    Past Surgical History:   Procedure Laterality Date   • APPENDECTOMY     • CATARACT EXTRACTION     • COLONOSCOPY      2 years ago   • KNEE ARTHROPLASTY UNICOMPARTMENTAL Left 2/6/2017    Procedure: UNICOMPARTMENTAL ARTHROPLASTY  LEFT knee;  Surgeon: Richard Bennett MD;  Location: [facility];  Service:    • SKIN CANCER EXCISION      MELANOMA   • STOMACH SURGERY         Family History   Problem Relation Age of Onset   • Cancer Mother    • Stroke Father    • Prostate cancer Brother    • Prostate cancer Brother    • Prostate cancer Brother        Social History     Social History   • Marital status:      Social History Main Topics   • Smoking status: Former Smoker     Types: Pipe     Quit date: 1970   • Smokeless tobacco: Never Used      Comment: quit over 40 years ago    • Alcohol use 1.2 - 2.4 oz/week     1 - 2 Glasses of wine, 1 - 2 Cans of beer per week      Comment: NIGHTLY   • Drug use: No   • Sexual activity: Defer     Other Topics Concern   • Not on file           Objective   Physical Exam   Constitutional: He is oriented to person, place, and time. He appears well-developed and well-nourished. No distress.   HENT:   Head: Normocephalic and atraumatic.   Right Ear: External ear normal.   Left Ear: External ear normal.   Nose: Nose normal.   Mouth/Throat: Oropharynx is clear and moist. No oropharyngeal exudate.   Eyes: Pupils are equal, round, and reactive to light. Conjunctivae and EOM are normal. Right eye exhibits no discharge. Left eye exhibits no discharge. No scleral icterus.   Neck: Normal range of motion. Neck supple. No JVD present. No tracheal deviation present. No thyromegaly present.   Cardiovascular: Normal rate.    Pulmonary/Chest: Effort normal. No stridor. No respiratory distress.   Abdominal: Soft. He exhibits no distension.   Genitourinary: No penile tenderness.   Genitourinary Comments: Soft tissue swelling to foreskin inferiorly with mild erythema of the foreskin, corona and glans.  No lesions noted.  Shaffer catheter appears in place, and no discharge has been noted.  Testes are descended and nontender bilaterally.  No regional adenopathy or other evidence of secondary infection.   Musculoskeletal: Normal range of  motion. He exhibits no edema, tenderness or deformity.   Neurological: He is alert and oriented to person, place, and time. No cranial nerve deficit. He exhibits normal muscle tone. Coordination normal.   Skin: Skin is warm and dry. No rash noted. He is not diaphoretic. No erythema. No pallor.   Psychiatric: He has a normal mood and affect. His behavior is normal. Judgment and thought content normal.   Nursing note and vitals reviewed.      Procedures           ED Course        Recent Results (from the past 24 hour(s))   Urinalysis without microscopic (no culture) - Urine, Clean Catch    Collection Time: 10/25/18  5:54 PM   Result Value Ref Range    Color, UA Yellow Yellow, Straw    Appearance, UA Clear Clear    pH, UA 5.5 5.0 - 8.0    Specific Gravity, UA 1.026 1.001 - 1.030    Glucose, UA Negative Negative    Ketones, UA Trace (A) Negative    Bilirubin, UA Negative Negative    Blood, UA Negative Negative    Protein, UA Negative Negative    Leuk Esterase, UA Moderate (2+) (A) Negative    Nitrite, UA Positive (A) Negative    Urobilinogen, UA 0.2 E.U./dL 0.2 - 1.0 E.U./dL   Urinalysis, Microscopic Only - Urine, Clean Catch    Collection Time: 10/25/18  5:54 PM   Result Value Ref Range    RBC, UA 0-2 None Seen, 0-2 /HPF    WBC, UA 31-50 (A) None Seen, 0-2 /HPF    Bacteria, UA Trace None Seen, Trace /HPF    Squamous Epithelial Cells, UA None Seen None Seen, 0-2 /HPF    Hyaline Casts, UA 7-12 0 - 6 /LPF    Methodology Automated Microscopy      Note: In addition to lab results from this visit, the labs listed above may include labs taken at another facility or during a different encounter within the last 24 hours. Please correlate lab times with ED admission and discharge times for further clarification of the services performed during this visit.    No orders to display     Vitals:    10/25/18 1700 10/25/18 1703 10/25/18 1716 10/25/18 1812   BP: 132/50      BP Location:       Patient Position:       Pulse:   80 83    Resp:       Temp:       TempSrc:       SpO2:  95%  96%   Weight:       Height:         Medications - No data to display  ECG/EMG Results (last 24 hours)     ** No results found for the last 24 hours. **                MDM      Final diagnoses:   Balanitis   Presence of indwelling Shaffer catheter   Urinary tract infection without hematuria, site unspecified            Abdiel Chand PA-C  10/25/18 7721

## 2018-10-26 ENCOUNTER — APPOINTMENT (OUTPATIENT)
Dept: ONCOLOGY | Facility: HOSPITAL | Age: 83
End: 2018-10-26

## 2018-10-29 ENCOUNTER — ANESTHESIA EVENT (OUTPATIENT)
Dept: PERIOP | Facility: HOSPITAL | Age: 83
End: 2018-10-29

## 2018-10-29 RX ORDER — FAMOTIDINE 10 MG/ML
20 INJECTION, SOLUTION INTRAVENOUS ONCE
Status: CANCELLED | OUTPATIENT
Start: 2018-10-29 | End: 2018-10-29

## 2018-10-30 ENCOUNTER — ANESTHESIA (OUTPATIENT)
Dept: PERIOP | Facility: HOSPITAL | Age: 83
End: 2018-10-30

## 2018-10-30 ENCOUNTER — HOSPITAL ENCOUNTER (OUTPATIENT)
Facility: HOSPITAL | Age: 83
Discharge: HOME | End: 2018-10-31
Attending: UROLOGY | Admitting: UROLOGY

## 2018-10-30 DIAGNOSIS — I10 HYPERTENSION, UNSPECIFIED TYPE: Primary | ICD-10-CM

## 2018-10-30 PROBLEM — Z98.890 HISTORY OF PROSTATE SURGERY: Status: ACTIVE | Noted: 2018-10-30

## 2018-10-30 PROBLEM — R73.03 PREDIABETES: Status: ACTIVE | Noted: 2018-10-30

## 2018-10-30 PROBLEM — R33.9 URINARY RETENTION: Status: ACTIVE | Noted: 2018-10-30

## 2018-10-30 LAB — POTASSIUM BLDA-SCNC: 3.68 MMOL/L (ref 3.5–5.3)

## 2018-10-30 PROCEDURE — 94799 UNLISTED PULMONARY SVC/PX: CPT

## 2018-10-30 PROCEDURE — A9270 NON-COVERED ITEM OR SERVICE: HCPCS | Performed by: UROLOGY

## 2018-10-30 PROCEDURE — 25010000002 FENTANYL CITRATE (PF) 100 MCG/2ML SOLUTION: Performed by: NURSE ANESTHETIST, CERTIFIED REGISTERED

## 2018-10-30 PROCEDURE — 25010000002 CEFOXITIN: Performed by: UROLOGY

## 2018-10-30 PROCEDURE — 63710000001 FAMOTIDINE 20 MG TABLET: Performed by: ANESTHESIOLOGY

## 2018-10-30 PROCEDURE — 63710000001 TAMSULOSIN 0.4 MG CAPSULE: Performed by: UROLOGY

## 2018-10-30 PROCEDURE — 25010000002 PROPOFOL 10 MG/ML EMULSION: Performed by: NURSE ANESTHETIST, CERTIFIED REGISTERED

## 2018-10-30 PROCEDURE — 63710000001 GABAPENTIN 300 MG CAPSULE: Performed by: UROLOGY

## 2018-10-30 PROCEDURE — 63710000001 MELOXICAM 7.5 MG TABLET: Performed by: UROLOGY

## 2018-10-30 PROCEDURE — 84132 ASSAY OF SERUM POTASSIUM: CPT | Performed by: ANESTHESIOLOGY

## 2018-10-30 PROCEDURE — 63710000001 ACETAMINOPHEN 325 MG TABLET: Performed by: UROLOGY

## 2018-10-30 PROCEDURE — A9270 NON-COVERED ITEM OR SERVICE: HCPCS | Performed by: ANESTHESIOLOGY

## 2018-10-30 PROCEDURE — 63710000001 FINASTERIDE 5 MG TABLET: Performed by: UROLOGY

## 2018-10-30 PROCEDURE — G0378 HOSPITAL OBSERVATION PER HR: HCPCS

## 2018-10-30 PROCEDURE — 25810000003 SODIUM CHLORIDE 0.9 % WITH KCL 20 MEQ 20-0.9 MEQ/L-% SOLUTION: Performed by: UROLOGY

## 2018-10-30 PROCEDURE — 25010000002 ONDANSETRON PER 1 MG: Performed by: NURSE ANESTHETIST, CERTIFIED REGISTERED

## 2018-10-30 PROCEDURE — 63710000001 GABAPENTIN 100 MG CAPSULE: Performed by: UROLOGY

## 2018-10-30 PROCEDURE — 25010000002 DEXAMETHASONE PER 1 MG: Performed by: NURSE ANESTHETIST, CERTIFIED REGISTERED

## 2018-10-30 PROCEDURE — 63710000001 OPIUM-BELLADONNA 16.2-60 MG SUPPOSITORY: Performed by: UROLOGY

## 2018-10-30 RX ORDER — SODIUM CHLORIDE AND POTASSIUM CHLORIDE 150; 900 MG/100ML; MG/100ML
100 INJECTION, SOLUTION INTRAVENOUS CONTINUOUS
Status: DISCONTINUED | OUTPATIENT
Start: 2018-10-30 | End: 2018-10-31 | Stop reason: HOSPADM

## 2018-10-30 RX ORDER — HYDROMORPHONE HYDROCHLORIDE 1 MG/ML
0.5 INJECTION, SOLUTION INTRAMUSCULAR; INTRAVENOUS; SUBCUTANEOUS
Status: DISCONTINUED | OUTPATIENT
Start: 2018-10-30 | End: 2018-10-31 | Stop reason: HOSPADM

## 2018-10-30 RX ORDER — PANTOPRAZOLE SODIUM 40 MG/1
40 TABLET, DELAYED RELEASE ORAL
Status: DISCONTINUED | OUTPATIENT
Start: 2018-10-31 | End: 2018-10-31 | Stop reason: HOSPADM

## 2018-10-30 RX ORDER — SODIUM CHLORIDE, SODIUM LACTATE, POTASSIUM CHLORIDE, CALCIUM CHLORIDE 600; 310; 30; 20 MG/100ML; MG/100ML; MG/100ML; MG/100ML
INJECTION, SOLUTION INTRAVENOUS CONTINUOUS PRN
Status: DISCONTINUED | OUTPATIENT
Start: 2018-10-30 | End: 2018-10-30 | Stop reason: SURG

## 2018-10-30 RX ORDER — ACETAMINOPHEN 650 MG/1
650 SUPPOSITORY RECTAL EVERY 6 HOURS
Status: DISCONTINUED | OUTPATIENT
Start: 2018-10-30 | End: 2018-10-31 | Stop reason: HOSPADM

## 2018-10-30 RX ORDER — TAMSULOSIN HYDROCHLORIDE 0.4 MG/1
0.4 CAPSULE ORAL DAILY
Status: DISCONTINUED | OUTPATIENT
Start: 2018-10-30 | End: 2018-10-31 | Stop reason: HOSPADM

## 2018-10-30 RX ORDER — FENTANYL CITRATE 50 UG/ML
50 INJECTION, SOLUTION INTRAMUSCULAR; INTRAVENOUS
Status: DISCONTINUED | OUTPATIENT
Start: 2018-10-30 | End: 2018-10-30 | Stop reason: HOSPADM

## 2018-10-30 RX ORDER — SODIUM CHLORIDE 0.9 % (FLUSH) 0.9 %
3 SYRINGE (ML) INJECTION EVERY 12 HOURS SCHEDULED
Status: DISCONTINUED | OUTPATIENT
Start: 2018-10-30 | End: 2018-10-30 | Stop reason: HOSPADM

## 2018-10-30 RX ORDER — LIDOCAINE HYDROCHLORIDE 10 MG/ML
INJECTION, SOLUTION EPIDURAL; INFILTRATION; INTRACAUDAL; PERINEURAL AS NEEDED
Status: DISCONTINUED | OUTPATIENT
Start: 2018-10-30 | End: 2018-10-30 | Stop reason: SURG

## 2018-10-30 RX ORDER — HYDROCODONE BITARTRATE AND ACETAMINOPHEN 7.5; 325 MG/1; MG/1
2 TABLET ORAL EVERY 4 HOURS PRN
Status: DISCONTINUED | OUTPATIENT
Start: 2018-10-30 | End: 2018-10-31 | Stop reason: HOSPADM

## 2018-10-30 RX ORDER — LIDOCAINE HYDROCHLORIDE 10 MG/ML
0.5 INJECTION, SOLUTION EPIDURAL; INFILTRATION; INTRACAUDAL; PERINEURAL ONCE AS NEEDED
Status: COMPLETED | OUTPATIENT
Start: 2018-10-30 | End: 2018-10-30

## 2018-10-30 RX ORDER — LABETALOL HYDROCHLORIDE 5 MG/ML
10 INJECTION, SOLUTION INTRAVENOUS EVERY 4 HOURS PRN
Status: DISCONTINUED | OUTPATIENT
Start: 2018-10-30 | End: 2018-10-31 | Stop reason: HOSPADM

## 2018-10-30 RX ORDER — FAMOTIDINE 20 MG/1
20 TABLET, FILM COATED ORAL ONCE
Status: COMPLETED | OUTPATIENT
Start: 2018-10-30 | End: 2018-10-30

## 2018-10-30 RX ORDER — ONDANSETRON 2 MG/ML
4 INJECTION INTRAMUSCULAR; INTRAVENOUS ONCE AS NEEDED
Status: DISCONTINUED | OUTPATIENT
Start: 2018-10-30 | End: 2018-10-30 | Stop reason: HOSPADM

## 2018-10-30 RX ORDER — ONDANSETRON 2 MG/ML
4 INJECTION INTRAMUSCULAR; INTRAVENOUS EVERY 6 HOURS PRN
Status: DISCONTINUED | OUTPATIENT
Start: 2018-10-30 | End: 2018-10-31 | Stop reason: HOSPADM

## 2018-10-30 RX ORDER — ACETAMINOPHEN 500 MG
1000 TABLET ORAL EVERY 6 HOURS PRN
COMMUNITY

## 2018-10-30 RX ORDER — ACETAMINOPHEN 160 MG/5ML
650 SOLUTION ORAL EVERY 6 HOURS
Status: DISCONTINUED | OUTPATIENT
Start: 2018-10-30 | End: 2018-10-31 | Stop reason: HOSPADM

## 2018-10-30 RX ORDER — TRIAMTERENE AND HYDROCHLOROTHIAZIDE 37.5; 25 MG/1; MG/1
1 TABLET ORAL DAILY
Status: DISCONTINUED | OUTPATIENT
Start: 2018-10-31 | End: 2018-10-31 | Stop reason: HOSPADM

## 2018-10-30 RX ORDER — SODIUM CHLORIDE, SODIUM LACTATE, POTASSIUM CHLORIDE, CALCIUM CHLORIDE 600; 310; 30; 20 MG/100ML; MG/100ML; MG/100ML; MG/100ML
9 INJECTION, SOLUTION INTRAVENOUS CONTINUOUS
Status: DISCONTINUED | OUTPATIENT
Start: 2018-10-30 | End: 2018-10-31 | Stop reason: HOSPADM

## 2018-10-30 RX ORDER — SODIUM CHLORIDE 0.9 % (FLUSH) 0.9 %
3-10 SYRINGE (ML) INJECTION AS NEEDED
Status: DISCONTINUED | OUTPATIENT
Start: 2018-10-30 | End: 2018-10-30 | Stop reason: HOSPADM

## 2018-10-30 RX ORDER — ONDANSETRON 2 MG/ML
4 INJECTION INTRAMUSCULAR; INTRAVENOUS EVERY 6 HOURS PRN
Status: DISCONTINUED | OUTPATIENT
Start: 2018-10-30 | End: 2018-10-30

## 2018-10-30 RX ORDER — NALOXONE HCL 0.4 MG/ML
0.1 VIAL (ML) INJECTION
Status: DISCONTINUED | OUTPATIENT
Start: 2018-10-30 | End: 2018-10-31 | Stop reason: HOSPADM

## 2018-10-30 RX ORDER — MAGNESIUM HYDROXIDE 1200 MG/15ML
LIQUID ORAL AS NEEDED
Status: DISCONTINUED | OUTPATIENT
Start: 2018-10-30 | End: 2018-10-30 | Stop reason: HOSPADM

## 2018-10-30 RX ORDER — PROPOFOL 10 MG/ML
VIAL (ML) INTRAVENOUS AS NEEDED
Status: DISCONTINUED | OUTPATIENT
Start: 2018-10-30 | End: 2018-10-30 | Stop reason: SURG

## 2018-10-30 RX ORDER — MELOXICAM 7.5 MG/1
15 TABLET ORAL ONCE
Status: COMPLETED | OUTPATIENT
Start: 2018-10-30 | End: 2018-10-30

## 2018-10-30 RX ORDER — ONDANSETRON 4 MG/1
4 TABLET, FILM COATED ORAL EVERY 6 HOURS PRN
Status: DISCONTINUED | OUTPATIENT
Start: 2018-10-30 | End: 2018-10-31 | Stop reason: HOSPADM

## 2018-10-30 RX ORDER — ONDANSETRON 2 MG/ML
INJECTION INTRAMUSCULAR; INTRAVENOUS AS NEEDED
Status: DISCONTINUED | OUTPATIENT
Start: 2018-10-30 | End: 2018-10-30 | Stop reason: SURG

## 2018-10-30 RX ORDER — FINASTERIDE 5 MG/1
5 TABLET, FILM COATED ORAL DAILY
Status: DISCONTINUED | OUTPATIENT
Start: 2018-10-30 | End: 2018-10-31 | Stop reason: HOSPADM

## 2018-10-30 RX ORDER — GABAPENTIN 100 MG/1
100 CAPSULE ORAL EVERY 12 HOURS SCHEDULED
Status: DISCONTINUED | OUTPATIENT
Start: 2018-10-30 | End: 2018-10-31 | Stop reason: HOSPADM

## 2018-10-30 RX ORDER — SODIUM CHLORIDE 9 MG/ML
INJECTION, SOLUTION INTRAVENOUS AS NEEDED
Status: DISCONTINUED | OUTPATIENT
Start: 2018-10-30 | End: 2018-10-30 | Stop reason: HOSPADM

## 2018-10-30 RX ORDER — DOCUSATE SODIUM 100 MG/1
100 CAPSULE, LIQUID FILLED ORAL 2 TIMES DAILY PRN
Status: DISCONTINUED | OUTPATIENT
Start: 2018-10-30 | End: 2018-10-31 | Stop reason: HOSPADM

## 2018-10-30 RX ORDER — DEXAMETHASONE SODIUM PHOSPHATE 4 MG/ML
INJECTION, SOLUTION INTRA-ARTICULAR; INTRALESIONAL; INTRAMUSCULAR; INTRAVENOUS; SOFT TISSUE AS NEEDED
Status: DISCONTINUED | OUTPATIENT
Start: 2018-10-30 | End: 2018-10-30 | Stop reason: SURG

## 2018-10-30 RX ORDER — ACETAMINOPHEN 500 MG
1000 TABLET ORAL ONCE
Status: DISCONTINUED | OUTPATIENT
Start: 2018-10-30 | End: 2018-10-30 | Stop reason: HOSPADM

## 2018-10-30 RX ORDER — FENTANYL CITRATE 50 UG/ML
INJECTION, SOLUTION INTRAMUSCULAR; INTRAVENOUS AS NEEDED
Status: DISCONTINUED | OUTPATIENT
Start: 2018-10-30 | End: 2018-10-30 | Stop reason: SURG

## 2018-10-30 RX ORDER — ATROPA BELLADONNA AND OPIUM 16.2; 6 MG/1; MG/1
SUPPOSITORY RECTAL AS NEEDED
Status: DISCONTINUED | OUTPATIENT
Start: 2018-10-30 | End: 2018-10-30 | Stop reason: HOSPADM

## 2018-10-30 RX ORDER — GABAPENTIN 300 MG/1
600 CAPSULE ORAL ONCE
Status: COMPLETED | OUTPATIENT
Start: 2018-10-30 | End: 2018-10-30

## 2018-10-30 RX ORDER — ACETAMINOPHEN 325 MG/1
650 TABLET ORAL EVERY 6 HOURS
Status: DISCONTINUED | OUTPATIENT
Start: 2018-10-30 | End: 2018-10-31 | Stop reason: HOSPADM

## 2018-10-30 RX ADMIN — SODIUM CHLORIDE, POTASSIUM CHLORIDE, SODIUM LACTATE AND CALCIUM CHLORIDE: 600; 310; 30; 20 INJECTION, SOLUTION INTRAVENOUS at 10:04

## 2018-10-30 RX ADMIN — POTASSIUM CHLORIDE AND SODIUM CHLORIDE 100 ML/HR: 900; 150 INJECTION, SOLUTION INTRAVENOUS at 12:12

## 2018-10-30 RX ADMIN — DEXAMETHASONE SODIUM PHOSPHATE 8 MG: 4 INJECTION, SOLUTION INTRAMUSCULAR; INTRAVENOUS at 10:15

## 2018-10-30 RX ADMIN — LIDOCAINE HYDROCHLORIDE 0.5 ML: 10 INJECTION, SOLUTION EPIDURAL; INFILTRATION; INTRACAUDAL; PERINEURAL at 08:00

## 2018-10-30 RX ADMIN — CEFOXITIN SODIUM 2 G: 2 POWDER, FOR SOLUTION INTRAVENOUS at 23:38

## 2018-10-30 RX ADMIN — CEFOXITIN SODIUM 2 G: 2 POWDER, FOR SOLUTION INTRAVENOUS at 16:38

## 2018-10-30 RX ADMIN — POTASSIUM CHLORIDE AND SODIUM CHLORIDE 100 ML/HR: 900; 150 INJECTION, SOLUTION INTRAVENOUS at 23:00

## 2018-10-30 RX ADMIN — GABAPENTIN 600 MG: 300 CAPSULE ORAL at 08:19

## 2018-10-30 RX ADMIN — FINASTERIDE 5 MG: 5 TABLET, FILM COATED ORAL at 13:14

## 2018-10-30 RX ADMIN — GABAPENTIN 100 MG: 100 CAPSULE ORAL at 13:14

## 2018-10-30 RX ADMIN — ONDANSETRON 4 MG: 2 INJECTION INTRAMUSCULAR; INTRAVENOUS at 10:45

## 2018-10-30 RX ADMIN — FENTANYL CITRATE 25 MCG: 50 INJECTION, SOLUTION INTRAMUSCULAR; INTRAVENOUS at 10:14

## 2018-10-30 RX ADMIN — MELOXICAM 15 MG: 7.5 TABLET ORAL at 08:19

## 2018-10-30 RX ADMIN — ACETAMINOPHEN 650 MG: 325 TABLET ORAL at 20:53

## 2018-10-30 RX ADMIN — FAMOTIDINE 20 MG: 20 TABLET ORAL at 08:19

## 2018-10-30 RX ADMIN — TAMSULOSIN HYDROCHLORIDE 0.4 MG: 0.4 CAPSULE ORAL at 13:14

## 2018-10-30 RX ADMIN — PROPOFOL 80 MG: 10 INJECTION, EMULSION INTRAVENOUS at 10:10

## 2018-10-30 RX ADMIN — LIDOCAINE HYDROCHLORIDE 50 MG: 10 INJECTION, SOLUTION EPIDURAL; INFILTRATION; INTRACAUDAL; PERINEURAL at 10:10

## 2018-10-30 RX ADMIN — ACETAMINOPHEN 650 MG: 325 TABLET ORAL at 13:13

## 2018-10-30 RX ADMIN — GABAPENTIN 100 MG: 100 CAPSULE ORAL at 23:37

## 2018-10-30 NOTE — ANESTHESIA POSTPROCEDURE EVALUATION
Patient: Sandip Feliz    Procedure Summary     Date:  10/30/18 Room / Location:   [facility]    Anesthesia Start:  1004 Anesthesia Stop:      Procedure:  GREENLIGHT LASER VAPORIZATION OF PROSTATE (N/A ) Diagnosis:      Surgeon:  Sen Rausch Jr., MD Provider:  Huang Moore MD    Anesthesia Type:  general ASA Status:  3          Anesthesia Type: general  Last vitals  /74   Temp 97   Pulse 64   Resp 16   SpO2 97     Post Anesthesia Care and Evaluation    Patient location during evaluation: PACU  Patient participation: complete - patient participated  Level of consciousness: awake and alert  Pain score: 0  Pain management: adequate  Airway patency: patent  Anesthetic complications: No anesthetic complications  PONV Status: none  Cardiovascular status: hemodynamically stable and acceptable  Respiratory status: nonlabored ventilation, acceptable and nasal cannula  Hydration status: acceptable

## 2018-10-30 NOTE — ANESTHESIA PREPROCEDURE EVALUATION
Anesthesia Evaluation     Patient summary reviewed and Nursing notes reviewed   no history of anesthetic complications:  NPO Solid Status: > 8 hours  NPO Liquid Status: > 8 hours           Airway   Mallampati: II  TM distance: >3 FB  Neck ROM: full  No difficulty expected  Dental - normal exam   (+) edentulous    Pulmonary - negative pulmonary ROS and normal exam    breath sounds clear to auscultation  Cardiovascular     ECG reviewed  Rhythm: irregular  Rate: normal    (+) hypertension, dysrhythmias (off xarelto x 2 months) Atrial Fib,     ROS comment: Echo 5/16 - EF 50-55%    Neuro/Psych- negative ROS  GI/Hepatic/Renal/Endo    (+)  GERD,      ROS Comment: BPH    Musculoskeletal     Abdominal    Substance History      OB/GYN          Other   (+) arthritis                     Anesthesia Plan    ASA 3     general     intravenous induction   Anesthetic plan, all risks, benefits, and alternatives have been provided, discussed and informed consent has been obtained with: patient.    Plan discussed with CRNA.

## 2018-10-31 VITALS
RESPIRATION RATE: 16 BRPM | HEIGHT: 65 IN | DIASTOLIC BLOOD PRESSURE: 50 MMHG | OXYGEN SATURATION: 96 % | WEIGHT: 144 LBS | HEART RATE: 68 BPM | SYSTOLIC BLOOD PRESSURE: 106 MMHG | TEMPERATURE: 97.6 F | BODY MASS INDEX: 23.99 KG/M2

## 2018-10-31 PROBLEM — D72.829 LEUKOCYTOSIS: Status: ACTIVE | Noted: 2018-10-31

## 2018-10-31 PROBLEM — E87.1 HYPONATREMIA: Status: ACTIVE | Noted: 2018-10-31

## 2018-10-31 LAB
ANION GAP SERPL CALCULATED.3IONS-SCNC: 6 MMOL/L (ref 3–11)
BUN BLD-MCNC: 11 MG/DL (ref 9–23)
BUN/CREAT SERPL: 14.5 (ref 7–25)
CALCIUM SPEC-SCNC: 7.6 MG/DL (ref 8.7–10.4)
CHLORIDE SERPL-SCNC: 101 MMOL/L (ref 99–109)
CO2 SERPL-SCNC: 19 MMOL/L (ref 20–31)
CREAT BLD-MCNC: 0.76 MG/DL (ref 0.6–1.3)
DEPRECATED RDW RBC AUTO: 45.4 FL (ref 37–54)
ERYTHROCYTE [DISTWIDTH] IN BLOOD BY AUTOMATED COUNT: 15.2 % (ref 11.3–14.5)
GFR SERPL CREATININE-BSD FRML MDRD: 97 ML/MIN/1.73
GLUCOSE BLD-MCNC: 139 MG/DL (ref 70–100)
HCT VFR BLD AUTO: 29 % (ref 38.9–50.9)
HGB BLD-MCNC: 9.2 G/DL (ref 13.1–17.5)
MCH RBC QN AUTO: 26.1 PG (ref 27–31)
MCHC RBC AUTO-ENTMCNC: 31.7 G/DL (ref 32–36)
MCV RBC AUTO: 82.4 FL (ref 80–99)
PLATELET # BLD AUTO: 298 10*3/MM3 (ref 150–450)
PMV BLD AUTO: 9.4 FL (ref 6–12)
POTASSIUM BLD-SCNC: 4.2 MMOL/L (ref 3.5–5.5)
RBC # BLD AUTO: 3.52 10*6/MM3 (ref 4.2–5.76)
SODIUM BLD-SCNC: 126 MMOL/L (ref 132–146)
WBC NRBC COR # BLD: 13.06 10*3/MM3 (ref 3.5–10.8)

## 2018-10-31 PROCEDURE — 25010000002 ENOXAPARIN PER 10 MG: Performed by: UROLOGY

## 2018-10-31 PROCEDURE — 80048 BASIC METABOLIC PNL TOTAL CA: CPT | Performed by: UROLOGY

## 2018-10-31 PROCEDURE — A9270 NON-COVERED ITEM OR SERVICE: HCPCS | Performed by: UROLOGY

## 2018-10-31 PROCEDURE — 63710000001 TRIAMTERENE-HYDROCHLOROTHIAZIDE 37.5-25 MG TABLET: Performed by: UROLOGY

## 2018-10-31 PROCEDURE — 63710000001 ACETAMINOPHEN 325 MG TABLET: Performed by: UROLOGY

## 2018-10-31 PROCEDURE — 63710000001 GABAPENTIN 100 MG CAPSULE: Performed by: UROLOGY

## 2018-10-31 PROCEDURE — 85027 COMPLETE CBC AUTOMATED: CPT | Performed by: UROLOGY

## 2018-10-31 PROCEDURE — 63710000001 PANTOPRAZOLE 40 MG TABLET DELAYED-RELEASE: Performed by: UROLOGY

## 2018-10-31 PROCEDURE — 63710000001 TAMSULOSIN 0.4 MG CAPSULE: Performed by: UROLOGY

## 2018-10-31 PROCEDURE — 63710000001 FINASTERIDE 5 MG TABLET: Performed by: UROLOGY

## 2018-10-31 PROCEDURE — 63710000001 AMLODIPINE 5 MG TABLET 100 EACH BOX: Performed by: UROLOGY

## 2018-10-31 PROCEDURE — 63710000001 LISINOPRIL 20 MG TABLET 1 EACH BLISTER: Performed by: UROLOGY

## 2018-10-31 PROCEDURE — G0378 HOSPITAL OBSERVATION PER HR: HCPCS

## 2018-10-31 RX ORDER — HYDROCODONE BITARTRATE AND ACETAMINOPHEN 5; 325 MG/1; MG/1
1 TABLET ORAL EVERY 6 HOURS PRN
Qty: 30 TABLET | Refills: 0
Start: 2018-10-31 | End: 2019-01-01

## 2018-10-31 RX ORDER — PSEUDOEPHEDRINE HCL 30 MG
1 TABLET ORAL 2 TIMES DAILY
Qty: 20 EACH | Refills: 0
Start: 2018-10-31 | End: 2019-01-01

## 2018-10-31 RX ADMIN — ENOXAPARIN SODIUM 30 MG: 30 INJECTION SUBCUTANEOUS at 08:15

## 2018-10-31 RX ADMIN — ACETAMINOPHEN 650 MG: 325 TABLET ORAL at 08:15

## 2018-10-31 RX ADMIN — LISINOPRIL: 20 TABLET ORAL at 08:27

## 2018-10-31 RX ADMIN — ACETAMINOPHEN 650 MG: 325 TABLET ORAL at 01:59

## 2018-10-31 RX ADMIN — TRIAMTERENE AND HYDROCHLOROTHIAZIDE 1 TABLET: 37.5; 25 TABLET ORAL at 08:15

## 2018-10-31 RX ADMIN — TAMSULOSIN HYDROCHLORIDE 0.4 MG: 0.4 CAPSULE ORAL at 08:15

## 2018-10-31 RX ADMIN — PANTOPRAZOLE SODIUM 40 MG: 40 TABLET, DELAYED RELEASE ORAL at 05:59

## 2018-10-31 RX ADMIN — GABAPENTIN 100 MG: 100 CAPSULE ORAL at 08:15

## 2018-10-31 RX ADMIN — FINASTERIDE 5 MG: 5 TABLET, FILM COATED ORAL at 08:15

## 2018-12-01 ENCOUNTER — APPOINTMENT (OUTPATIENT)
Dept: GENERAL RADIOLOGY | Facility: HOSPITAL | Age: 83
End: 2018-12-01

## 2018-12-01 ENCOUNTER — APPOINTMENT (OUTPATIENT)
Dept: CT IMAGING | Facility: HOSPITAL | Age: 83
End: 2018-12-01

## 2018-12-01 ENCOUNTER — HOSPITAL ENCOUNTER (INPATIENT)
Facility: HOSPITAL | Age: 83
LOS: 4 days | Discharge: NURSING, REHAB OR LONG-TERM CARE | End: 2018-12-06
Attending: EMERGENCY MEDICINE | Admitting: INTERNAL MEDICINE

## 2018-12-01 DIAGNOSIS — N39.0 ACUTE UTI: Primary | ICD-10-CM

## 2018-12-01 DIAGNOSIS — Z86.79 HISTORY OF ATRIAL FIBRILLATION: ICD-10-CM

## 2018-12-01 DIAGNOSIS — R33.8 ACUTE URINARY RETENTION: ICD-10-CM

## 2018-12-01 DIAGNOSIS — M79.604 BILATERAL LOWER EXTREMITY PAIN: ICD-10-CM

## 2018-12-01 DIAGNOSIS — Z91.81 AT HIGH RISK FOR FALLS: ICD-10-CM

## 2018-12-01 DIAGNOSIS — R26.2 UNABLE TO AMBULATE: ICD-10-CM

## 2018-12-01 DIAGNOSIS — Z86.79 HISTORY OF HYPERTENSION: ICD-10-CM

## 2018-12-01 DIAGNOSIS — Z74.09 IMPAIRED FUNCTIONAL MOBILITY, BALANCE, GAIT, AND ENDURANCE: ICD-10-CM

## 2018-12-01 DIAGNOSIS — M79.605 BILATERAL LOWER EXTREMITY PAIN: ICD-10-CM

## 2018-12-01 PROBLEM — R53.1 GENERALIZED WEAKNESS: Status: ACTIVE | Noted: 2018-12-01

## 2018-12-01 PROBLEM — D64.9 ANEMIA: Status: ACTIVE | Noted: 2018-12-01

## 2018-12-01 LAB
ALBUMIN SERPL-MCNC: 4.23 G/DL (ref 3.2–4.8)
ALBUMIN/GLOB SERPL: 1.6 G/DL (ref 1.5–2.5)
ALP SERPL-CCNC: 107 U/L (ref 25–100)
ALT SERPL W P-5'-P-CCNC: 17 U/L (ref 7–40)
ANION GAP SERPL CALCULATED.3IONS-SCNC: 7 MMOL/L (ref 3–11)
AST SERPL-CCNC: 23 U/L (ref 0–33)
BACTERIA UR QL AUTO: ABNORMAL /HPF
BASOPHILS # BLD AUTO: 0.04 10*3/MM3 (ref 0–0.2)
BASOPHILS NFR BLD AUTO: 0.6 % (ref 0–1)
BILIRUB BLD-MCNC: NEGATIVE MG/DL
BILIRUB SERPL-MCNC: 0.4 MG/DL (ref 0.3–1.2)
BILIRUB UR QL STRIP: NEGATIVE
BNP SERPL-MCNC: 39 PG/ML (ref 0–100)
BUN BLD-MCNC: 17 MG/DL (ref 9–23)
BUN/CREAT SERPL: 19.1 (ref 7–25)
CALCIUM SPEC-SCNC: 8.9 MG/DL (ref 8.7–10.4)
CHLORIDE SERPL-SCNC: 97 MMOL/L (ref 99–109)
CLARITY UR: ABNORMAL
CLARITY, POC: ABNORMAL
CO2 SERPL-SCNC: 22 MMOL/L (ref 20–31)
COLOR UR: ABNORMAL
COLOR UR: ABNORMAL
CREAT BLD-MCNC: 0.89 MG/DL (ref 0.6–1.3)
DEPRECATED RDW RBC AUTO: 45.2 FL (ref 37–54)
EOSINOPHIL # BLD AUTO: 0.15 10*3/MM3 (ref 0–0.3)
EOSINOPHIL NFR BLD AUTO: 2.1 % (ref 0–3)
ERYTHROCYTE [DISTWIDTH] IN BLOOD BY AUTOMATED COUNT: 14.9 % (ref 11.3–14.5)
GFR SERPL CREATININE-BSD FRML MDRD: 81 ML/MIN/1.73
GLOBULIN UR ELPH-MCNC: 2.6 GM/DL
GLUCOSE BLD-MCNC: 116 MG/DL (ref 70–100)
GLUCOSE UR STRIP-MCNC: NEGATIVE MG/DL
GLUCOSE UR STRIP-MCNC: NEGATIVE MG/DL
HCT VFR BLD AUTO: 33.8 % (ref 38.9–50.9)
HGB BLD-MCNC: 10.5 G/DL (ref 13.1–17.5)
HGB UR QL STRIP.AUTO: ABNORMAL
HYALINE CASTS UR QL AUTO: ABNORMAL /LPF
IMM GRANULOCYTES # BLD: 0.02 10*3/MM3 (ref 0–0.03)
IMM GRANULOCYTES NFR BLD: 0.3 % (ref 0–0.6)
KETONES UR QL STRIP: ABNORMAL
KETONES UR QL: ABNORMAL
LEUKOCYTE EST, POC: ABNORMAL
LEUKOCYTE ESTERASE UR QL STRIP.AUTO: ABNORMAL
LYMPHOCYTES # BLD AUTO: 0.86 10*3/MM3 (ref 0.6–4.8)
LYMPHOCYTES NFR BLD AUTO: 12.2 % (ref 24–44)
MCH RBC QN AUTO: 25.5 PG (ref 27–31)
MCHC RBC AUTO-ENTMCNC: 31.1 G/DL (ref 32–36)
MCV RBC AUTO: 82.2 FL (ref 80–99)
MONOCYTES # BLD AUTO: 0.83 10*3/MM3 (ref 0–1)
MONOCYTES NFR BLD AUTO: 11.8 % (ref 0–12)
NEUTROPHILS # BLD AUTO: 5.18 10*3/MM3 (ref 1.5–8.3)
NEUTROPHILS NFR BLD AUTO: 73.3 % (ref 41–71)
NITRITE UR QL STRIP: POSITIVE
NITRITE UR-MCNC: POSITIVE MG/ML
OSMOLALITY SERPL: 282 MOSM/KG (ref 275–295)
OSMOLALITY UR: 422 MOSM/KG (ref 300–1100)
PH UR STRIP.AUTO: 5.5 [PH] (ref 5–8)
PH UR: 6 [PH] (ref 5–8)
PLATELET # BLD AUTO: 386 10*3/MM3 (ref 150–450)
PMV BLD AUTO: 9.2 FL (ref 6–12)
POTASSIUM BLD-SCNC: 3.8 MMOL/L (ref 3.5–5.5)
PROT SERPL-MCNC: 6.8 G/DL (ref 5.7–8.2)
PROT UR QL STRIP: ABNORMAL
PROT UR STRIP-MCNC: ABNORMAL MG/DL
RBC # BLD AUTO: 4.11 10*6/MM3 (ref 4.2–5.76)
RBC # UR STRIP: ABNORMAL /UL
RBC # UR: ABNORMAL /HPF
REF LAB TEST METHOD: ABNORMAL
SODIUM BLD-SCNC: 126 MMOL/L (ref 132–146)
SODIUM UR-SCNC: 94 MMOL/L (ref 30–90)
SP GR UR STRIP: 1.01 (ref 1–1.03)
SP GR UR: 1.02 (ref 1–1.03)
SQUAMOUS #/AREA URNS HPF: ABNORMAL /HPF
UROBILINOGEN UR QL STRIP: ABNORMAL
UROBILINOGEN UR QL: NORMAL
WBC NRBC COR # BLD: 7.06 10*3/MM3 (ref 3.5–10.8)
WBC UR QL AUTO: ABNORMAL /HPF

## 2018-12-01 PROCEDURE — A9270 NON-COVERED ITEM OR SERVICE: HCPCS | Performed by: NURSE PRACTITIONER

## 2018-12-01 PROCEDURE — 63710000001 HYDROCODONE-ACETAMINOPHEN 5-325 MG TABLET: Performed by: NURSE PRACTITIONER

## 2018-12-01 PROCEDURE — 99223 1ST HOSP IP/OBS HIGH 75: CPT | Performed by: INTERNAL MEDICINE

## 2018-12-01 PROCEDURE — 83930 ASSAY OF BLOOD OSMOLALITY: CPT | Performed by: NURSE PRACTITIONER

## 2018-12-01 PROCEDURE — 71045 X-RAY EXAM CHEST 1 VIEW: CPT

## 2018-12-01 PROCEDURE — 25010000002 CEFTRIAXONE PER 250 MG: Performed by: EMERGENCY MEDICINE

## 2018-12-01 PROCEDURE — 84300 ASSAY OF URINE SODIUM: CPT | Performed by: NURSE PRACTITIONER

## 2018-12-01 PROCEDURE — 82533 TOTAL CORTISOL: CPT | Performed by: INTERNAL MEDICINE

## 2018-12-01 PROCEDURE — 87086 URINE CULTURE/COLONY COUNT: CPT | Performed by: PHYSICIAN ASSISTANT

## 2018-12-01 PROCEDURE — 83935 ASSAY OF URINE OSMOLALITY: CPT | Performed by: NURSE PRACTITIONER

## 2018-12-01 PROCEDURE — 63710000001 LIDOCAINE 2 % GEL 11 ML SYRINGE: Performed by: NURSE PRACTITIONER

## 2018-12-01 PROCEDURE — 25010000002 DIPHENHYDRAMINE PER 50 MG: Performed by: EMERGENCY MEDICINE

## 2018-12-01 PROCEDURE — 87186 SC STD MICRODIL/AGAR DIL: CPT | Performed by: PHYSICIAN ASSISTANT

## 2018-12-01 PROCEDURE — 63710000001 DOCUSATE SODIUM 100 MG CAPSULE: Performed by: NURSE PRACTITIONER

## 2018-12-01 PROCEDURE — 25010000002 MORPHINE PER 10 MG: Performed by: EMERGENCY MEDICINE

## 2018-12-01 PROCEDURE — 99284 EMERGENCY DEPT VISIT MOD MDM: CPT

## 2018-12-01 PROCEDURE — 85025 COMPLETE CBC W/AUTO DIFF WBC: CPT | Performed by: PHYSICIAN ASSISTANT

## 2018-12-01 PROCEDURE — 87077 CULTURE AEROBIC IDENTIFY: CPT | Performed by: PHYSICIAN ASSISTANT

## 2018-12-01 PROCEDURE — 63710000001 SACCHAROMYCES BOULARDII 250 MG CAPSULE: Performed by: NURSE PRACTITIONER

## 2018-12-01 PROCEDURE — 80053 COMPREHEN METABOLIC PANEL: CPT | Performed by: PHYSICIAN ASSISTANT

## 2018-12-01 PROCEDURE — 51798 US URINE CAPACITY MEASURE: CPT

## 2018-12-01 PROCEDURE — 81001 URINALYSIS AUTO W/SCOPE: CPT | Performed by: PHYSICIAN ASSISTANT

## 2018-12-01 PROCEDURE — 83880 ASSAY OF NATRIURETIC PEPTIDE: CPT | Performed by: PHYSICIAN ASSISTANT

## 2018-12-01 PROCEDURE — 25010000002 ONDANSETRON PER 1 MG: Performed by: EMERGENCY MEDICINE

## 2018-12-01 PROCEDURE — 81002 URINALYSIS NONAUTO W/O SCOPE: CPT | Performed by: PHYSICIAN ASSISTANT

## 2018-12-01 PROCEDURE — 93005 ELECTROCARDIOGRAM TRACING: CPT | Performed by: PHYSICIAN ASSISTANT

## 2018-12-01 RX ORDER — SODIUM CHLORIDE 0.9 % (FLUSH) 0.9 %
3-10 SYRINGE (ML) INJECTION AS NEEDED
Status: DISCONTINUED | OUTPATIENT
Start: 2018-12-01 | End: 2018-12-06 | Stop reason: HOSPADM

## 2018-12-01 RX ORDER — SODIUM CHLORIDE 0.9 % (FLUSH) 0.9 %
3 SYRINGE (ML) INJECTION EVERY 12 HOURS SCHEDULED
Status: DISCONTINUED | OUTPATIENT
Start: 2018-12-01 | End: 2018-12-06 | Stop reason: HOSPADM

## 2018-12-01 RX ORDER — ONDANSETRON 2 MG/ML
4 INJECTION INTRAMUSCULAR; INTRAVENOUS ONCE
Status: COMPLETED | OUTPATIENT
Start: 2018-12-01 | End: 2018-12-01

## 2018-12-01 RX ORDER — DIPHENHYDRAMINE HYDROCHLORIDE 50 MG/ML
12.5 INJECTION INTRAMUSCULAR; INTRAVENOUS ONCE
Status: COMPLETED | OUTPATIENT
Start: 2018-12-01 | End: 2018-12-01

## 2018-12-01 RX ORDER — DOCUSATE SODIUM 100 MG/1
100 CAPSULE, LIQUID FILLED ORAL 2 TIMES DAILY
Status: DISCONTINUED | OUTPATIENT
Start: 2018-12-01 | End: 2018-12-06 | Stop reason: HOSPADM

## 2018-12-01 RX ORDER — CEFTRIAXONE SODIUM 1 G/50ML
1 INJECTION, SOLUTION INTRAVENOUS EVERY 24 HOURS
Status: DISCONTINUED | OUTPATIENT
Start: 2018-12-02 | End: 2018-12-05

## 2018-12-01 RX ORDER — LANOLIN ALCOHOL/MO/W.PET/CERES
1000 CREAM (GRAM) TOPICAL DAILY
Status: DISCONTINUED | OUTPATIENT
Start: 2018-12-02 | End: 2018-12-06 | Stop reason: HOSPADM

## 2018-12-01 RX ORDER — FINASTERIDE 5 MG/1
5 TABLET, FILM COATED ORAL DAILY
Status: DISCONTINUED | OUTPATIENT
Start: 2018-12-02 | End: 2018-12-06 | Stop reason: HOSPADM

## 2018-12-01 RX ORDER — MORPHINE SULFATE 4 MG/ML
2 INJECTION, SOLUTION INTRAMUSCULAR; INTRAVENOUS ONCE
Status: COMPLETED | OUTPATIENT
Start: 2018-12-01 | End: 2018-12-01

## 2018-12-01 RX ORDER — HYDROCODONE BITARTRATE AND ACETAMINOPHEN 5; 325 MG/1; MG/1
1 TABLET ORAL EVERY 6 HOURS PRN
Status: DISCONTINUED | OUTPATIENT
Start: 2018-12-01 | End: 2018-12-06 | Stop reason: HOSPADM

## 2018-12-01 RX ORDER — SODIUM CHLORIDE 0.9 % (FLUSH) 0.9 %
10 SYRINGE (ML) INJECTION AS NEEDED
Status: DISCONTINUED | OUTPATIENT
Start: 2018-12-01 | End: 2018-12-06 | Stop reason: HOSPADM

## 2018-12-01 RX ORDER — CEFTRIAXONE SODIUM 1 G/50ML
1 INJECTION, SOLUTION INTRAVENOUS ONCE
Status: COMPLETED | OUTPATIENT
Start: 2018-12-01 | End: 2018-12-01

## 2018-12-01 RX ORDER — SODIUM CHLORIDE 9 MG/ML
75 INJECTION, SOLUTION INTRAVENOUS CONTINUOUS
Status: DISCONTINUED | OUTPATIENT
Start: 2018-12-01 | End: 2018-12-04

## 2018-12-01 RX ORDER — ACETAMINOPHEN 500 MG
1000 TABLET ORAL EVERY 6 HOURS PRN
Status: DISCONTINUED | OUTPATIENT
Start: 2018-12-01 | End: 2018-12-06 | Stop reason: HOSPADM

## 2018-12-01 RX ORDER — TRIAMTERENE AND HYDROCHLOROTHIAZIDE 37.5; 25 MG/1; MG/1
1 TABLET ORAL DAILY
Status: DISCONTINUED | OUTPATIENT
Start: 2018-12-02 | End: 2018-12-01

## 2018-12-01 RX ORDER — SACCHAROMYCES BOULARDII 250 MG
250 CAPSULE ORAL 2 TIMES DAILY
Status: DISCONTINUED | OUTPATIENT
Start: 2018-12-01 | End: 2018-12-06 | Stop reason: HOSPADM

## 2018-12-01 RX ADMIN — SODIUM CHLORIDE 75 ML/HR: 9 INJECTION, SOLUTION INTRAVENOUS at 21:47

## 2018-12-01 RX ADMIN — Medication 250 MG: at 21:48

## 2018-12-01 RX ADMIN — DOCUSATE SODIUM 100 MG: 100 CAPSULE, LIQUID FILLED ORAL at 21:48

## 2018-12-01 RX ADMIN — CEFTRIAXONE SODIUM 1 G: 1 INJECTION, SOLUTION INTRAVENOUS at 15:15

## 2018-12-01 RX ADMIN — HYDROCODONE BITARTRATE AND ACETAMINOPHEN 1 TABLET: 5; 325 TABLET ORAL at 23:48

## 2018-12-01 RX ADMIN — MORPHINE SULFATE 2 MG: 4 INJECTION, SOLUTION INTRAMUSCULAR; INTRAVENOUS at 15:13

## 2018-12-01 RX ADMIN — SODIUM CHLORIDE, PRESERVATIVE FREE 3 ML: 5 INJECTION INTRAVENOUS at 21:48

## 2018-12-01 RX ADMIN — LIDOCAINE HYDROCHLORIDE: 20 JELLY TOPICAL at 21:10

## 2018-12-01 RX ADMIN — DIPHENHYDRAMINE HYDROCHLORIDE 12.5 MG: 50 INJECTION, SOLUTION INTRAMUSCULAR; INTRAVENOUS at 15:33

## 2018-12-01 RX ADMIN — ONDANSETRON 4 MG: 2 INJECTION INTRAMUSCULAR; INTRAVENOUS at 15:08

## 2018-12-01 NOTE — ED PROVIDER NOTES
Subjective   Sandip Feliz is a 87 y.o.male who presents to the ED with c/o leg pain with onset 4 months ago. He reports that he suddenly developed bilateral feet and lower leg pain with numbness and tingling sensations. His pain is described as a throbbing type sensation. The pt denies any redness or warmth across his BLE. Spouse notes that the pt is not able to ambulate secondary to his severe pain. At baseline the pt uses a walker. The pt also notes that he has developed severe bilateral lower back pain and urinary difficulty and pressure secondary to his leg pain. He has attempted tylenol for his pain with minimal relief. The pt went to his PCP for this complaint who prescribed him tramadol. He notes that this medication gives him partial relief, however, his pain has persisted which prompted his visit to the ED. The pt notes that he had an enlarged bladder and had a cystoscopy with clot evacuation performed by Dr. Rausch, Urologist. He had a catheter put in at this time and was removed 3 weeks ago. He also notes that he was taken off his Xarelto medication before his procedure took place. He has hx of HTN and a fib but denies any hx of DM. He also complains of weakness and SoA but denies any N/V, abd pain, fever, or any complaints at this time. He denies any recent falls.        History provided by:  Patient  Lower Extremity Issue   Location:  Leg and foot  Injury: no    Leg location:  L lower leg and R lower leg  Foot location:  L foot and R foot  Pain details:     Quality:  Aching    Radiates to:  Does not radiate    Severity:  Moderate    Onset quality:  Sudden    Timing:  Constant    Progression:  Worsening  Chronicity:  New  Dislocation: no    Foreign body present:  No foreign bodies  Tetanus status:  Unknown  Prior injury to area:  Unable to specify  Relieved by:  Nothing  Worsened by:  Nothing  Ineffective treatments:  Acetaminophen and NSAIDs  Associated symptoms: back pain, numbness and tingling     Associated symptoms: no fever        Review of Systems   Constitutional: Negative for fever.   Respiratory: Positive for shortness of breath.    Gastrointestinal: Negative for abdominal pain, nausea and vomiting.   Genitourinary: Positive for difficulty urinating and dysuria.   Musculoskeletal: Positive for back pain.        BLE pain.   Skin: Negative for color change.   Neurological: Positive for weakness and numbness.   All other systems reviewed and are negative.      Past Medical History:   Diagnosis Date   • A-fib (CMS/HCC)    • Arthritis    • Shaffer catheter in place    • GERD (gastroesophageal reflux disease)    • Hearing impairment    • Hearing loss     both ears hearing aides   • Heartburn    • Hypertension    • Melanoma (CMS/HCC)    • Wears dentures    • Wears dentures     partial bottom full at top   • Wears glasses    • Wears hearing aid        No Known Allergies    Past Surgical History:   Procedure Laterality Date   • APPENDECTOMY     • CATARACT EXTRACTION     • COLONOSCOPY      2 years ago   • SKIN CANCER EXCISION      MELANOMA   • STOMACH SURGERY         Family History   Problem Relation Age of Onset   • Cancer Mother    • Stroke Father    • Prostate cancer Brother    • Prostate cancer Brother    • Prostate cancer Brother        Social History     Socioeconomic History   • Marital status:      Spouse name: Not on file   • Number of children: Not on file   • Years of education: Not on file   • Highest education level: Not on file   Tobacco Use   • Smoking status: Former Smoker     Types: Pipe     Last attempt to quit: 1970     Years since quittin.9   • Smokeless tobacco: Never Used   • Tobacco comment: quit over 40 years ago    Substance and Sexual Activity   • Alcohol use: Yes     Alcohol/week: 1.2 - 2.4 oz     Types: 1 - 2 Glasses of wine, 1 - 2 Cans of beer per week     Comment: NIGHTLY   • Drug use: No   • Sexual activity: Defer         Objective   Physical Exam   Constitutional: He  is oriented to person, place, and time. He appears well-developed and well-nourished. No distress.   Pleasant elderly male who appears generally weak.   HENT:   Head: Normocephalic and atraumatic.   Nose: Nose normal.   Eyes: Conjunctivae are normal. No scleral icterus.   Neck: Normal range of motion. Neck supple.   Cardiovascular: Normal rate, regular rhythm, normal heart sounds, intact distal pulses and normal pulses. Exam reveals no gallop and no friction rub.   No murmur heard.  Pulses:       Dorsalis pedis pulses are 2+ on the right side, and 2+ on the left side.        Posterior tibial pulses are 2+ on the right side, and 2+ on the left side.   No ectopy.   Pulmonary/Chest: Effort normal and breath sounds normal. No respiratory distress.   Abdominal: Soft. Bowel sounds are normal. There is no tenderness.   No flank or CVA tenderness.   Musculoskeletal: He exhibits no edema (No pedal edema).   No lumbar spinal tenderness. 1+ patellar reflex. BLE are normal. Dorsi/plantar flexion are normal. No erythema, warmth, or soft tissue swelling across BLE. No significant varicosities. He moves his BLE equally. He has good strength. He cannot bear weight secondary to pain.     Neurological: He is alert and oriented to person, place, and time.   Skin: Skin is warm and dry.   Psychiatric: He has a normal mood and affect. His behavior is normal.   Nursing note and vitals reviewed.      Procedures         ED Course  ED Course as of Dec 01 1819   Sat Dec 01, 2018   1406 Urine from urinalysis appears cloudy with increased sediment.  EKG shows normal sinus rhythm.  There is no ectopy, arrhythmia, or evidence of ischemia.  Wife reports the patient cannot ambulate at all.  She denies any recent falls, but he is unable to ambulate, even with his walker.  She will have to scoot a stool behind the patient to sit down with every step.  [FC]   1452 Due to increased pain, patient could not last supine for CT scan.  The radiology tech  brought him back to the brain.  She tried multiple times without success.  We will give him something for pain and try to repeat CT scan.  [FC]   1613 CBC was within normal limits, except for mild anemia.  H&H is 10 and 33, which is improved as compared to CBC from a month ago where H&H was 9 and 29.  Chemistries revealed hyponatremia with sodium level of 126.  Otherwise chemistries were within normal limits.  Urinalysis reveals too numerous to count white blood cells, 1+ bacteria, moderate leukocyte esterase, and positive nitrite.  Urine culture is in process.  EKG showed normal sinus rhythm.  There is no evidence of ectopy, arrhythmia, or ischemia.  BNP was normal at 39.  Chest x-ray reveals enlarged heart with mild increased pulmonary vascular congestion, but no active disease.  Awaiting results of CT scan of the lumbar spine without contrast.  Patient is over in CT now and hopefully he will be able to have this performed.  [FC]   1650 Radiology tech tried to perform CT of the lumbar spine without contrast again after pain medications were given.  Patient was unable to tolerate the CT scan and could not lie supine, so second attempt was unsuccessful, as well.  [FC]   1707 Re-evaluated the pt and the pt is resting comfortably.  [SC]   1709 Paged the hospitalist to discuss admission due to inability to bear weight, as well as significant urinary tract infection.  Reviewed previous imaging.  Patient abdominal density in March, 2018.  There is evidence of osteoporosis to the left femoral neck and left forearm.  There was also osteopenia of total left hip.  [FC]   1736 Discussed the case with Dr. Amado.  She is agreeable to admission on telemetry.  [FC]   1738 Discussed admission with patient and spouse and they are agreeable to admission.  [FC]   1817 Bladder scan revealed 818 ml.  Patient had difficulty with bladder scan and lying back.  Will try to proceed with christianson catheter placement prior to admission.  [FC]       ED Course User Index  [FC] Zoey Valle PA-C  [SC] Ismael Collins       Recent Results (from the past 24 hour(s))   Urinalysis With Culture If Indicated - Urine, Clean Catch    Collection Time: 12/01/18  2:02 PM   Result Value Ref Range    Color, UA Dark Yellow (A) Yellow, Straw    Appearance, UA Turbid (A) Clear    pH, UA 5.5 5.0 - 8.0    Specific Gravity, UA 1.011 1.001 - 1.030    Glucose, UA Negative Negative    Ketones, UA Trace (A) Negative    Bilirubin, UA Negative Negative    Blood, UA Large (3+) (A) Negative    Protein, UA Trace (A) Negative    Leuk Esterase, UA Large (3+) (A) Negative    Nitrite, UA Positive (A) Negative    Urobilinogen, UA 0.2 E.U./dL 0.2 - 1.0 E.U./dL   Urinalysis, Microscopic Only - Urine, Clean Catch    Collection Time: 12/01/18  2:02 PM   Result Value Ref Range    RBC, UA 21-30 (A) None Seen, 0-2 /HPF    WBC, UA Too Numerous to Count (A) None Seen, 0-2 /HPF    Bacteria, UA 1+ (A) None Seen, Trace /HPF    Squamous Epithelial Cells, UA 0-2 None Seen, 0-2 /HPF    Hyaline Casts, UA 0-6 0 - 6 /LPF    Methodology Manual Light Microscopy    Comprehensive Metabolic Panel    Collection Time: 12/01/18  2:05 PM   Result Value Ref Range    Glucose 116 (H) 70 - 100 mg/dL    BUN 17 9 - 23 mg/dL    Creatinine 0.89 0.60 - 1.30 mg/dL    Sodium 126 (L) 132 - 146 mmol/L    Potassium 3.8 3.5 - 5.5 mmol/L    Chloride 97 (L) 99 - 109 mmol/L    CO2 22.0 20.0 - 31.0 mmol/L    Calcium 8.9 8.7 - 10.4 mg/dL    Total Protein 6.8 5.7 - 8.2 g/dL    Albumin 4.23 3.20 - 4.80 g/dL    ALT (SGPT) 17 7 - 40 U/L    AST (SGOT) 23 0 - 33 U/L    Alkaline Phosphatase 107 (H) 25 - 100 U/L    Total Bilirubin 0.4 0.3 - 1.2 mg/dL    eGFR Non African Amer 81 >60 mL/min/1.73    Globulin 2.6 gm/dL    A/G Ratio 1.6 1.5 - 2.5 g/dL    BUN/Creatinine Ratio 19.1 7.0 - 25.0    Anion Gap 7.0 3.0 - 11.0 mmol/L   BNP    Collection Time: 12/01/18  2:05 PM   Result Value Ref Range    BNP 39.0 0.0 - 100.0 pg/mL   CBC Auto Differential     Collection Time: 12/01/18  2:05 PM   Result Value Ref Range    WBC 7.06 3.50 - 10.80 10*3/mm3    RBC 4.11 (L) 4.20 - 5.76 10*6/mm3    Hemoglobin 10.5 (L) 13.1 - 17.5 g/dL    Hematocrit 33.8 (L) 38.9 - 50.9 %    MCV 82.2 80.0 - 99.0 fL    MCH 25.5 (L) 27.0 - 31.0 pg    MCHC 31.1 (L) 32.0 - 36.0 g/dL    RDW 14.9 (H) 11.3 - 14.5 %    RDW-SD 45.2 37.0 - 54.0 fl    MPV 9.2 6.0 - 12.0 fL    Platelets 386 150 - 450 10*3/mm3    Neutrophil % 73.3 (H) 41.0 - 71.0 %    Lymphocyte % 12.2 (L) 24.0 - 44.0 %    Monocyte % 11.8 0.0 - 12.0 %    Eosinophil % 2.1 0.0 - 3.0 %    Basophil % 0.6 0.0 - 1.0 %    Immature Grans % 0.3 0.0 - 0.6 %    Neutrophils, Absolute 5.18 1.50 - 8.30 10*3/mm3    Lymphocytes, Absolute 0.86 0.60 - 4.80 10*3/mm3    Monocytes, Absolute 0.83 0.00 - 1.00 10*3/mm3    Eosinophils, Absolute 0.15 0.00 - 0.30 10*3/mm3    Basophils, Absolute 0.04 0.00 - 0.20 10*3/mm3    Immature Grans, Absolute 0.02 0.00 - 0.03 10*3/mm3   POCT Urinalysis, dipstick    Collection Time: 12/01/18  2:14 PM   Result Value Ref Range    Color Straw Yellow, Straw, Dark Yellow, Dorothy    Clarity, UA Cloudy (A) Clear    Glucose, UA Negative Negative, 1000 mg/dL (3+) mg/dL    Bilirubin Negative Negative    Ketones, UA Trace (A) Negative    Specific Gravity  1.025 1.005 - 1.030    Blood, UA 3+ (A) Negative    pH, Urine 6.0 5.0 - 8.0    Protein, POC Trace (A) Negative mg/dL    Urobilinogen, UA Normal Normal    Leukocytes Moderate (2+) (A) Negative    Nitrite, UA Positive (A) Negative     Note: In addition to lab results from this visit, the labs listed above may include labs taken at another facility or during a different encounter within the last 24 hours. Please correlate lab times with ED admission and discharge times for further clarification of the services performed during this visit.    XR Chest 1 View   Preliminary Result   Enlargement of the heart with slight prominence of the   pulmonary vascularity. No evidence of acute parenchymal  disease.       DICTATED:   12/01/2018   EDITED/ls :   12/01/2018               CT Lumbar Spine Without Contrast    (Results Pending)     Vitals:    12/01/18 1530 12/01/18 1630 12/01/18 1700 12/01/18 1730   BP: 131/74 123/82 139/93 130/81   BP Location:       Patient Position:       Pulse:       Resp:       Temp:       TempSrc:       SpO2: 93% 93% 94% 92%   Weight:       Height:         Medications   sodium chloride 0.9 % flush 10 mL (not administered)   Morphine sulfate (PF) injection 2 mg (2 mg Intravenous Given 12/1/18 1513)   ondansetron (ZOFRAN) injection 4 mg (4 mg Intravenous Given 12/1/18 1508)   cefTRIAXone (ROCEPHIN) IVPB 1 g (0 g Intravenous Stopped 12/1/18 1545)   diphenhydrAMINE (BENADRYL) injection 12.5 mg (12.5 mg Intravenous Given 12/1/18 1533)     ECG/EMG Results (last 24 hours)     Procedure Component Value Units Date/Time    ECG 12 Lead [[id number]] Collected:  12/01/18 1400     Updated:  12/01/18 1403                [id number] Score (for atrial fibrillation stroke risk) reviewed and/or performed as part of the patient evaluation and treatment planning process.  The result associated with this review/performance is: 3           MDM    Final diagnoses:   Acute UTI   Bilateral lower extremity pain   Unable to ambulate   History of atrial fibrillation   History of hypertension   At high risk for falls   Acute urinary retention       Documentation assistance provided by radha Collins.  Information recorded by the radha was done at my direction and has been verified and validated by me.     Ismael Collins  12/01/18 1427       Zoey Valle PA-C  12/01/18 1742       Zoey Valle PA-C  12/01/18 1743       Zoey Valle PA-C  12/01/18 3420

## 2018-12-02 ENCOUNTER — APPOINTMENT (OUTPATIENT)
Dept: OTHER | Facility: HOSPITAL | Age: 83
End: 2018-12-02
Attending: PSYCHIATRY & NEUROLOGY

## 2018-12-02 ENCOUNTER — APPOINTMENT (OUTPATIENT)
Dept: CARDIOLOGY | Facility: HOSPITAL | Age: 83
End: 2018-12-02
Attending: INTERNAL MEDICINE

## 2018-12-02 PROBLEM — N39.0 ACUTE UTI: Status: ACTIVE | Noted: 2018-12-02

## 2018-12-02 LAB
ANION GAP SERPL CALCULATED.3IONS-SCNC: 9 MMOL/L (ref 3–11)
BH CV ECHO MEAS - AO ROOT AREA (BSA CORRECTED): 2
BH CV ECHO MEAS - AO ROOT AREA: 9 CM^2
BH CV ECHO MEAS - AO ROOT DIAM: 3.4 CM
BH CV ECHO MEAS - BSA(HAYCOCK): 1.7 M^2
BH CV ECHO MEAS - BSA: 1.7 M^2
BH CV ECHO MEAS - BZI_BMI: 23.3 KILOGRAMS/M^2
BH CV ECHO MEAS - BZI_METRIC_HEIGHT: 165.1 CM
BH CV ECHO MEAS - BZI_METRIC_WEIGHT: 63.5 KG
BH CV ECHO MEAS - EDV(CUBED): 48.5 ML
BH CV ECHO MEAS - EDV(MOD-SP2): 92 ML
BH CV ECHO MEAS - EDV(MOD-SP4): 113 ML
BH CV ECHO MEAS - EDV(TEICH): 56.2 ML
BH CV ECHO MEAS - EF(CUBED): 83.6 %
BH CV ECHO MEAS - EF(MOD-BP): 63 %
BH CV ECHO MEAS - EF(MOD-SP2): 72.8 %
BH CV ECHO MEAS - EF(MOD-SP4): 58.4 %
BH CV ECHO MEAS - EF(TEICH): 77.5 %
BH CV ECHO MEAS - ESV(CUBED): 7.9 ML
BH CV ECHO MEAS - ESV(MOD-SP2): 25 ML
BH CV ECHO MEAS - ESV(MOD-SP4): 47 ML
BH CV ECHO MEAS - ESV(TEICH): 12.6 ML
BH CV ECHO MEAS - FS: 45.3 %
BH CV ECHO MEAS - IVS/LVPW: 0.89
BH CV ECHO MEAS - IVSD: 1.2 CM
BH CV ECHO MEAS - LA DIMENSION: 2.9 CM
BH CV ECHO MEAS - LA/AO: 0.86
BH CV ECHO MEAS - LAT PEAK E' VEL: 6.7 CM/SEC
BH CV ECHO MEAS - LATERAL E/E' RATIO: 8.9
BH CV ECHO MEAS - LV DIASTOLIC VOL/BSA (35-75): 66.5 ML/M^2
BH CV ECHO MEAS - LV MASS(C)D: 165.1 GRAMS
BH CV ECHO MEAS - LV MASS(C)DI: 97.1 GRAMS/M^2
BH CV ECHO MEAS - LV MAX PG: 3.7 MMHG
BH CV ECHO MEAS - LV MEAN PG: 1.9 MMHG
BH CV ECHO MEAS - LV SYSTOLIC VOL/BSA (12-30): 27.6 ML/M^2
BH CV ECHO MEAS - LV V1 MAX: 95.8 CM/SEC
BH CV ECHO MEAS - LV V1 MEAN: 63.7 CM/SEC
BH CV ECHO MEAS - LV V1 VTI: 18.7 CM
BH CV ECHO MEAS - LVIDD: 3.6 CM
BH CV ECHO MEAS - LVIDS: 2 CM
BH CV ECHO MEAS - LVLD AP2: 8.2 CM
BH CV ECHO MEAS - LVLD AP4: 8.6 CM
BH CV ECHO MEAS - LVLS AP2: 5.9 CM
BH CV ECHO MEAS - LVLS AP4: 7.2 CM
BH CV ECHO MEAS - LVOT AREA (M): 3.8 CM^2
BH CV ECHO MEAS - LVOT AREA: 3.7 CM^2
BH CV ECHO MEAS - LVOT DIAM: 2.2 CM
BH CV ECHO MEAS - LVPWD: 1.4 CM
BH CV ECHO MEAS - MED PEAK E' VEL: 4.5 CM/SEC
BH CV ECHO MEAS - MEDIAL E/E' RATIO: 13.2
BH CV ECHO MEAS - MV A MAX VEL: 92.8 CM/SEC
BH CV ECHO MEAS - MV E MAX VEL: 61.2 CM/SEC
BH CV ECHO MEAS - MV E/A: 0.66
BH CV ECHO MEAS - RVDD: 2.3 CM
BH CV ECHO MEAS - RVSP: 23 MMHG
BH CV ECHO MEAS - SI(CUBED): 23.9 ML/M^2
BH CV ECHO MEAS - SI(LVOT): 40.3 ML/M^2
BH CV ECHO MEAS - SI(MOD-SP2): 39.4 ML/M^2
BH CV ECHO MEAS - SI(MOD-SP4): 38.8 ML/M^2
BH CV ECHO MEAS - SI(TEICH): 25.6 ML/M^2
BH CV ECHO MEAS - SV(CUBED): 40.6 ML
BH CV ECHO MEAS - SV(LVOT): 68.6 ML
BH CV ECHO MEAS - SV(MOD-SP2): 67 ML
BH CV ECHO MEAS - SV(MOD-SP4): 66 ML
BH CV ECHO MEAS - SV(TEICH): 43.5 ML
BH CV ECHO MEAS - TR MAX PG: 21 MMHG
BH CV ECHO MEAS - TR MAX VEL: 226.7 CM/SEC
BH CV ECHO MEASUREMENTS AVERAGE E/E' RATIO: 10.93
BH CV VAS BP LEFT ARM: NORMAL MMHG
BUN BLD-MCNC: 14 MG/DL (ref 9–23)
BUN/CREAT SERPL: 18.7 (ref 7–25)
CALCIUM SPEC-SCNC: 8.6 MG/DL (ref 8.7–10.4)
CHLORIDE SERPL-SCNC: 101 MMOL/L (ref 99–109)
CO2 SERPL-SCNC: 23 MMOL/L (ref 20–31)
CORTIS SERPL-MCNC: 12.5 MCG/DL
CREAT BLD-MCNC: 0.75 MG/DL (ref 0.6–1.3)
DEPRECATED RDW RBC AUTO: 46.9 FL (ref 37–54)
ERYTHROCYTE [DISTWIDTH] IN BLOOD BY AUTOMATED COUNT: 15.3 % (ref 11.3–14.5)
FOLATE SERPL-MCNC: >24 NG/ML (ref 3.2–20)
GFR SERPL CREATININE-BSD FRML MDRD: 99 ML/MIN/1.73
GLUCOSE BLD-MCNC: 112 MG/DL (ref 70–100)
HBA1C MFR BLD: 6.7 % (ref 4.8–5.6)
HCT VFR BLD AUTO: 33.6 % (ref 38.9–50.9)
HGB BLD-MCNC: 10.3 G/DL (ref 13.1–17.5)
LEFT ATRIUM VOLUME INDEX: 23.5 ML/M2
MAXIMAL PREDICTED HEART RATE: 133 BPM
MCH RBC QN AUTO: 25.6 PG (ref 27–31)
MCHC RBC AUTO-ENTMCNC: 30.7 G/DL (ref 32–36)
MCV RBC AUTO: 83.6 FL (ref 80–99)
PLATELET # BLD AUTO: 377 10*3/MM3 (ref 150–450)
PMV BLD AUTO: 9.2 FL (ref 6–12)
POTASSIUM BLD-SCNC: 4 MMOL/L (ref 3.5–5.5)
RBC # BLD AUTO: 4.02 10*6/MM3 (ref 4.2–5.76)
SODIUM BLD-SCNC: 133 MMOL/L (ref 132–146)
STRESS TARGET HR: 113 BPM
TSH SERPL DL<=0.05 MIU/L-ACNC: 0.97 MIU/ML (ref 0.35–5.35)
VIT B12 BLD-MCNC: 676 PG/ML (ref 211–911)
WBC NRBC COR # BLD: 8.19 10*3/MM3 (ref 3.5–10.8)

## 2018-12-02 PROCEDURE — 25010000002 CEFTRIAXONE PER 250 MG: Performed by: NURSE PRACTITIONER

## 2018-12-02 PROCEDURE — 25010000002 SULFUR HEXAFLUORIDE MICROSPH 60.7-25 MG RECONSTITUTED SUSPENSION: Performed by: INTERNAL MEDICINE

## 2018-12-02 PROCEDURE — 93306 TTE W/DOPPLER COMPLETE: CPT

## 2018-12-02 PROCEDURE — 85027 COMPLETE CBC AUTOMATED: CPT | Performed by: NURSE PRACTITIONER

## 2018-12-02 PROCEDURE — 83036 HEMOGLOBIN GLYCOSYLATED A1C: CPT | Performed by: NURSE PRACTITIONER

## 2018-12-02 PROCEDURE — 82607 VITAMIN B-12: CPT | Performed by: INTERNAL MEDICINE

## 2018-12-02 PROCEDURE — 82746 ASSAY OF FOLIC ACID SERUM: CPT | Performed by: INTERNAL MEDICINE

## 2018-12-02 PROCEDURE — 84443 ASSAY THYROID STIM HORMONE: CPT | Performed by: INTERNAL MEDICINE

## 2018-12-02 PROCEDURE — 80048 BASIC METABOLIC PNL TOTAL CA: CPT | Performed by: NURSE PRACTITIONER

## 2018-12-02 PROCEDURE — 93306 TTE W/DOPPLER COMPLETE: CPT | Performed by: INTERNAL MEDICINE

## 2018-12-02 PROCEDURE — 99233 SBSQ HOSP IP/OBS HIGH 50: CPT | Performed by: INTERNAL MEDICINE

## 2018-12-02 PROCEDURE — 99222 1ST HOSP IP/OBS MODERATE 55: CPT | Performed by: PSYCHIATRY & NEUROLOGY

## 2018-12-02 RX ORDER — GABAPENTIN 100 MG/1
100 CAPSULE ORAL EVERY 8 HOURS SCHEDULED
Status: DISCONTINUED | OUTPATIENT
Start: 2018-12-02 | End: 2018-12-06 | Stop reason: HOSPADM

## 2018-12-02 RX ADMIN — DOCUSATE SODIUM 100 MG: 100 CAPSULE, LIQUID FILLED ORAL at 08:25

## 2018-12-02 RX ADMIN — HYDROCODONE BITARTRATE AND ACETAMINOPHEN 1 TABLET: 5; 325 TABLET ORAL at 17:29

## 2018-12-02 RX ADMIN — HYDROCODONE BITARTRATE AND ACETAMINOPHEN 1 TABLET: 5; 325 TABLET ORAL at 08:28

## 2018-12-02 RX ADMIN — SODIUM CHLORIDE 75 ML/HR: 9 INJECTION, SOLUTION INTRAVENOUS at 12:18

## 2018-12-02 RX ADMIN — FINASTERIDE 5 MG: 5 TABLET, FILM COATED ORAL at 08:25

## 2018-12-02 RX ADMIN — Medication 250 MG: at 08:25

## 2018-12-02 RX ADMIN — Medication 250 MG: at 20:32

## 2018-12-02 RX ADMIN — SULFUR HEXAFLUORIDE 3 ML: KIT at 13:45

## 2018-12-02 RX ADMIN — DOCUSATE SODIUM 100 MG: 100 CAPSULE, LIQUID FILLED ORAL at 20:32

## 2018-12-02 RX ADMIN — CEFTRIAXONE SODIUM 1 G: 1 INJECTION, SOLUTION INTRAVENOUS at 08:28

## 2018-12-02 RX ADMIN — SODIUM CHLORIDE, PRESERVATIVE FREE 3 ML: 5 INJECTION INTRAVENOUS at 20:33

## 2018-12-02 RX ADMIN — CYANOCOBALAMIN TAB 1000 MCG 1000 MCG: 1000 TAB at 08:25

## 2018-12-02 RX ADMIN — GABAPENTIN 100 MG: 100 CAPSULE ORAL at 17:29

## 2018-12-02 RX ADMIN — GABAPENTIN 100 MG: 100 CAPSULE ORAL at 21:42

## 2018-12-02 RX ADMIN — LISINOPRIL: 20 TABLET ORAL at 08:25

## 2018-12-02 NOTE — H&P
[facility] Medicine Services  HISTORY AND PHYSICAL    Patient Name: Sandip Feliz  : 1931  MRN: [id number]  Primary Care Physician: Kelly Robison MD  Date of admission: 2018      Subjective   Subjective     Chief Complaint:  Leg pain    HPI:  Sandip Feliz is a 87 y.o. male who presents to the ED with c/o leg pain with onset 4 months ago. He reports that he has pain from his knees down that waxes and wanes with tingling in his toes. His pain is described as aching. The pt denies any redness or warmth across his BLE.  At baseline the patient uses a walker, and today was unable to ambulate secondary to pain. He reports having low back pain which is chronic and unchanged from baseline. The pt went to his PCP for this complaint who prescribed him tramadol. He notes that this medication gives him partial relief, however, his pain has persisted which prompted his visit to the ED.  Patient also reports having urinary retention that started last night.  He notes that he had an enlarged bladder and had a cystoscopy with clot evacuation performed by Dr. Rausch, Urologist at the end of October. He had a catheter put in at this time and was removed 3 weeks ago. He also notes that he was taken off his Xarelto before his procedure took place.  He also complains of generalized weakness but denies fever, chills, chest pain, edema, or any complaints at this time. He denies any recent falls.  CT of the lumbar spine was attempted twice tonight but patient was unable to tolerated secondary to pain in his back and legs when being in the supine position.  Patient was started on Rocephin in the  ED for a UTI, and is being admitted to the Hospitalist for further evaluation and management.          Review of Systems   Constitutional: Positive for activity change and appetite change. Negative for chills, diaphoresis, fatigue, fever and unexpected weight change.   HENT: Negative.    Eyes:  Negative.    Respiratory: Positive for shortness of breath (chronic). Negative for cough, choking, chest tightness, wheezing and stridor.    Cardiovascular: Negative.    Gastrointestinal: Negative.    Endocrine: Negative.    Genitourinary: Positive for decreased urine volume and difficulty urinating (started yesterday). Negative for dysuria, frequency, hematuria and urgency.   Musculoskeletal: Positive for back pain (low back, chronic and unchanged).        Bilateral foot and leg pain from the knees down   Skin: Negative.    Allergic/Immunologic: Negative.    Neurological: Positive for weakness (generalized). Negative for dizziness, tremors, seizures, syncope, facial asymmetry, speech difficulty, light-headedness, numbness and headaches.        Tingling in toes   Hematological: Negative.    Psychiatric/Behavioral: Negative.         Otherwise 10-system ROS reviewed and is negative except as mentioned in the HPI.    Personal History     Past Medical History:   Diagnosis Date   • A-fib (CMS/HCC)    • Arthritis    • Shaffer catheter in place    • GERD (gastroesophageal reflux disease)    • Hearing impairment    • Hearing loss     both ears hearing aides   • Heartburn    • Hypertension    • Melanoma (CMS/HCC)    • Wears dentures    • Wears dentures     partial bottom full at top   • Wears glasses    • Wears hearing aid        Past Surgical History:   Procedure Laterality Date   • APPENDECTOMY     • CATARACT EXTRACTION     • COLONOSCOPY      2 years ago   • SKIN CANCER EXCISION      MELANOMA   • STOMACH SURGERY         Family History: family history includes Cancer in his mother; Prostate cancer in his brother, brother, and brother; Stroke in his father. Otherwise pertinent FHx was reviewed and unremarkable.     Social History:  reports that he quit smoking about 48 years ago. His smoking use included pipe. he has never used smokeless tobacco. He reports that he drinks about 1.2 - 2.4 oz of alcohol per week. He reports that  he does not use drugs.  Social History     Social History Narrative   • Not on file       Medications:  Available home medication information reviewed.    No Known Allergies    Objective   Objective     Vital Signs:   Temp:  [97.7 °F (36.5 °C)-98.3 °F (36.8 °C)] 97.7 °F (36.5 °C)  Heart Rate:  [] 95  Resp:  [16-18] 18  BP: (117-147)/(74-93) 147/78        Physical Exam   Constitutional: He is oriented to person, place, and time. He appears well-developed. No distress.   HENT:   Head: Normocephalic.   Eyes: Pupils are equal, round, and reactive to light.   Neck: Normal range of motion. Neck supple.   Cardiovascular: Normal rate, regular rhythm, normal heart sounds and intact distal pulses. Exam reveals no gallop and no friction rub.   No murmur heard.  Pulmonary/Chest: Effort normal and breath sounds normal. No stridor. No respiratory distress. He has no wheezes. He has no rales.   Abdominal: Soft. Bowel sounds are normal. He exhibits no distension and no mass. There is no tenderness. There is no rebound and no guarding.   Musculoskeletal: Normal range of motion. He exhibits no edema, tenderness or deformity.   Neurological: He is alert and oriented to person, place, and time. No cranial nerve deficit.   Speech is clear, strength equal bilaterally, no sensory deficit   Skin: Skin is warm and dry. No rash noted. He is not diaphoretic. No erythema. No pallor.   Psychiatric: He has a normal mood and affect. His behavior is normal. Judgment and thought content normal.          Results Reviewed:  I have personally reviewed current lab, radiology, and data and agree.    Results from last 7 days   Lab Units  12/01/18   1405   WBC 10*3/mm3  7.06   HEMOGLOBIN g/dL  10.5*   HEMATOCRIT %  33.8*   PLATELETS 10*3/mm3  386     Results from last 7 days   Lab Units  12/01/18   1405   SODIUM mmol/L  126*   POTASSIUM mmol/L  3.8   CHLORIDE mmol/L  97*   CO2 mmol/L  22.0   BUN mg/dL  17   CREATININE mg/dL  0.89   GLUCOSE mg/dL   116*   CALCIUM mg/dL  8.9   ALT (SGPT) U/L  17   AST (SGOT) U/L  23     Estimated Creatinine Clearance: 52.5 mL/min (by C-G formula based on SCr of 0.89 mg/dL).  Brief Urine Lab Results  (Last result in the past 365 days)      Color   Clarity   Blood   Leuk Est   Nitrite   Protein   CREAT   Urine HCG        12/01/18 1414 Straw Cloudy 3+ Moderate (2+) Positive Trace             BNP   Date Value Ref Range Status   12/01/2018 39.0 0.0 - 100.0 pg/mL Final     Comment:     Results may be falsely decreased if patient taking Biotin.     Imaging Results (last 24 hours)     Procedure Component Value Units Date/Time    XR Chest 1 View [[id number]] Collected:  12/01/18 1631     Updated:  12/01/18 1631    Narrative:          EXAMINATION: XR CHEST 1 VW - 12/01/2018     INDICATION: Shortness of air.     COMPARISON: 10/22/2018     FINDINGS: Portable chest reveals the heart to be enlarged. Underlying  chronic changes seen in lung fields bilaterally with slight prominence  of the pulmonary vascularity. Degenerative changes seen within the  spine. No pleural effusion or pneumothorax. Chronic changes seen within  the lung bases. Slight prominence of the pulmonary vascularity.           Impression:       Enlargement of the heart with slight prominence of the  pulmonary vascularity. No evidence of acute parenchymal disease.     DICTATED:   12/01/2018  EDITED/ls :   12/01/2018               Results for orders placed in visit on 05/24/16   SCANNED - ECHOCARDIOGRAM       Assessment/Plan   Assessment / Plan     Active Hospital Problems    Diagnosis   • **Generalized weakness   • Acute UTI (urinary tract infection)   • Anemia   • Pain in both lower extremities   • Hyponatremia   • Urinary retention   • Prediabetes   • Paroxysmal atrial fibrillation (CMS/HCC)   • HTN (hypertension)         Assessment & Plan:    1.  Bilateral lower extremity pain and difficulty with ambulation   -fall precautions   -re-attempt CT of the lumbar spine in the  morning   -pt/ot consult in the am   -pain control    2.  Acute UTI (poa)   -rocephin   -urine culture   -bmp, cbc in the am     3.  Urinary retention   -christianson placed   -consult urology in the am    4.  Hyponatremia   -urine sodium and osmolality   -serum osmolality   -NS @ 75 ml/hr   -bmp in the am    5.  Generalized weakness   -pt/ot consult in the am   -fall precautions   -case management consult for discharge planning    6.  HTN   -continue home meds    7.  Anemia   -improved since previous admission   -cbc in the am and monitor    8.  PAF   -currently off Xarelto per Dr. Rausch secondary to recent surgery    9.  History of prediabetes   -a1c in the am    DVT prophylaxis:  Mechanical    CODE STATUS:    Code Status and Medical Interventions:   Ordered at: 12/01/18 2021     Level Of Support Discussed With:    Patient     Code Status:    CPR     Medical Interventions (Level of Support Prior to Arrest):    Full           Electronically signed by PITO Marr, 12/01/18, 7:21 PM.    PHYSICIAN NOTE(ADDENDUM)            Vitals:     12/01/18 1856   BP: 147/78   Pulse: 95   Resp: 18   Temp: 97.7 °F (36.5 °C)   SpO2: 96%         HPI  87-year-old male presented to ED with the chief complaint of lower extremity pain/weakness-going on for past 4 months-worse over past week..  Does co of some leg/knee pain, no overt back pain, difficulty standing, and bearing wt, no fall, no major sensory sx.        .  Also has urinary retention-past 24 hrs.no bowel incontinece.  Patient's catheter was removed approximately 3 weeks back.  Patient did had urinary retention-800 mL on bladder scan.  Patient got admitted at the end of October secondary to urinary retention status post laser surgery of prostate by Dr. samuel hardin, and reduction of paraphimosis.        Patient got morphine 2 mg IV, Zofran 4 mg, Rocephin 1 g,     Exam-very hard of hearring  RS- CTA-BL, ,  No wheezing , no crackles, good effort.  CVS- s1s2 Regular, no  murmur.  ABD- soft, non tender, not distended, no organomegaly.  EXT- no edema,good dorsi and plantar flexion, power is 4/5 in all 4 ext, but has significant tremors worse in arms,then leg.  No speech deficit or facial droop.  No back tenderness, Babinski down going.  NEURO- AAO-3, no focal deficit.        Old records reviewed and summarized in PM hx.        PM hx  Hypertension-Lotrel 5/20 by mouth daily.  Triamterene/HCTZ 37.5 by mouth daily  BPH-sinus steroid 5 mg by mouth daily, Flomax 0.4 mg by mouth daily at bedtime  Chronic pain-hydrocodone 5 mg by mouth every 6 when necessary.  GERD-Zantac 300 milligrams by mouth every RN  A. fib-Xarelto 10 mg-still on hold.  Osteoarthritis-left knee arthroplasty..  Drinks alcohol-daily -1 drink of vodka.     LABS  BNP of 39, glucose of 116, sodium of 126, potassium of 3.8, BUN/creatinine 17 and 0.8, bicarbonate of 22  Serum osmolality of 282, urine osmolality of 422 urine sodium of 94  WBC of 7, neutrophils of 73, hemoglobin of 10, hematocrit of 33, platelet count of 386,  UA-large blood, nitrite positive, RBC 21-30, and WBC too numerous to count, bacteria 1+,  Chest x-ray-enlargement of heart with slight prominence of PVC.  Almost similar to the old film.  EKG-sinus rhythm of 92 bpm, no acute ST-T wave changes, QTC of 440.     A/P  Lower ext weakness generalized as's with tremors-  DD-radiculopathy, ?tremors,   -will benefit from MRI-pt did not tolerate ct scan tonight-tried for lower back.  -neuro consult, b12, folate, TSH, s cortisol  -wup for hyponatremia-appears euvolemic-hold hctz. ?siadh  -hx of P.afib-xarelto is on hold-pt has slight hematuria tonight-need to address longterm anticoagulation-chadvasc=3.  -urology consult in am.        I have independently seen and examined the patient with ARNP and the note above reflects my changes and contributions. I have discussed with findings, diagnosis and plans with the patient and family.      Dulce Valencia MD 12/01/18  9:48 PM

## 2018-12-02 NOTE — PLAN OF CARE
Problem: Patient Care Overview  Goal: Plan of Care Review   12/02/18 7903   Coping/Psychosocial   Plan of Care Reviewed With patient   Plan of Care Review   Progress no change   OTHER   Outcome Summary Pt c/o pain in bilateral LE, neurontin started today, norco given prn, pt unable to lay flat, always in sitting position, catheter patient, VSS.

## 2018-12-02 NOTE — PLAN OF CARE
Problem: Patient Care Overview  Goal: Plan of Care Review  Outcome: Ongoing (interventions implemented as appropriate)   18   Coping/Psychosocial   Plan of Care Reviewed With patient   Plan of Care Review   Progress no change   OTHER   Outcome Summary Patient arrived to the [facility] earlier today. Awaiting MRI of brain and echocariogram. Currently resting, on 2 L NC. Coude catheter placed due to urinary retention. Will continue to monitor.       Problem: Fall Risk (Adult)  Intervention: Reduce Risk/Promote Restraint Free Environment   18   Safety Management   Environmental Safety Modification clutter free environment maintained;lighting adjusted;room near unit station;room organization consistent   Safety Promotion/Fall Prevention activity supervised;fall prevention program maintained;gait belt;nonskid shoes/slippers when out of bed;safety round/check completed   Prevent Crosby Drop/Fall   Safety/Security Measures bed alarm set;chair alarm set       Goal: Identify Related Risk Factors and Signs and Symptoms   18   Fall Risk (Adult)   Related Risk Factors (Fall Risk) bladder function altered;culprit medication(s);fatigue/slow reaction;fear of falling;gait/mobility problems;sleep pattern alteration;environment unfamiliar   Signs and Symptoms (Fall Risk) presence of risk factors     Goal: Absence of Fall  Outcome: Ongoing (interventions implemented as appropriate)   18   Fall Risk (Adult)   Absence of Fall making progress toward outcome       Problem: Skin Injury Risk (Adult)  Intervention: Promote/Optimize Nutrition   18   Nutrition Interventions   Oral Nutrition Promotion rest periods promoted;social interaction promoted       Goal: Identify Related Risk Factors and Signs and Symptoms  Outcome: Ongoing (interventions implemented as appropriate)   18   Skin Injury Risk (Adult)   Related Risk Factors (Skin Injury Risk) advanced age;mobility impaired      Goal: Skin Health and Integrity  Outcome: Ongoing (interventions implemented as appropriate)   12/02/18 0148   Skin Injury Risk (Adult)   Skin Health and Integrity making progress toward outcome

## 2018-12-02 NOTE — CONSULTS
Neurology    Referring Provider: Dr. Valencia    Reason for Consultation: BLE pain      Chief complaint: bilateral leg pain    Subjective .     History of present illness:  Mr. Feliz is a pleasant 87-year-old male with a past medical history significant for hypertension and A. fib who is admitted to the hospitalist service for bilateral leg pain.  His wife reports that his symptoms of been going on for several weeks, easily since September 2018.  He endorses low back pain shooting into both hips and down the lateral and posterior aspect of both thighs and into the posterior forelegs and the dorsal and plantar aspects of both feet.  He has difficulty lying supine due to exacerbation of pain.  At baseline he is able to ambulate with a walker but he has had some acute worsening in pain to where he is unable to bear weight.  On arrival to the ED here CT of the L-spine was attempted but he was unable to lie flat due to pain.  Neurology was consulted for further recommendations.  He denies any bowel incontinence or saddle anesthesia.    Review of Systems: Positive for pain.Otherwise complete review of systems was discussed with the patient and found to be negative except for that mentioned in history of present illness.    History  Past Medical History:   Diagnosis Date   • A-fib (CMS/HCC)    • Arthritis    • Shaffer catheter in place    • GERD (gastroesophageal reflux disease)    • Hearing impairment    • Hearing loss     both ears hearing aides   • Heartburn    • Hypertension    • Melanoma (CMS/HCC)    • Wears dentures    • Wears dentures     partial bottom full at top   • Wears glasses    • Wears hearing aid    ,   Past Surgical History:   Procedure Laterality Date   • APPENDECTOMY     • CATARACT EXTRACTION     • COLONOSCOPY      2 years ago   • SKIN CANCER EXCISION      MELANOMA   • STOMACH SURGERY     ,   Family History   Problem Relation Age of Onset   • Cancer Mother    • Stroke Father    • Prostate cancer Brother     • Prostate cancer Brother    • Prostate cancer Brother    ,   Social History     Tobacco Use   • Smoking status: Former Smoker     Types: Pipe     Last attempt to quit: 1970     Years since quittin.9   • Smokeless tobacco: Never Used   • Tobacco comment: quit over 40 years ago    Substance Use Topics   • Alcohol use: Yes     Alcohol/week: 1.2 - 2.4 oz     Types: 1 - 2 Glasses of wine, 1 - 2 Cans of beer per week     Comment: NIGHTLY   • Drug use: No   ,   Medications Prior to Admission   Medication Sig Dispense Refill Last Dose   • acetaminophen (TYLENOL) 500 MG tablet Take 1,000 mg by mouth Every 6 (Six) Hours As Needed for Mild Pain .   10/30/2018 at 0500   • amLODIPine-benazepril (LOTREL 5-20) 5-20 MG per capsule Take 1 capsule by mouth Daily.   10/30/2018 at 0500   • cefdinir (OMNICEF) 300 MG capsule Take 1 capsule by mouth 2 (Two) Times a Day. 10 capsule 0 10/30/2018 at 0500   • Cholecalciferol (VITAMIN D3) 2000 units tablet Take  by mouth Daily.   10/29/2018 at 0800   • denosumab (PROLIA) 60 MG/ML solution syringe Inject 60 mg under the skin every 6 (six) months.   10/23/2018   • docusate sodium 100 MG capsule Take 1 capsule by mouth 2 (Two) Times a Day. 20 each 0    • finasteride (PROSCAR) 5 MG tablet Take 1 tablet by mouth Daily. 30 tablet 0 10/29/2018 at 1200   • HYDROcodone-acetaminophen (NORCO) 5-325 MG per tablet Take 1 tablet by mouth Every 6 (Six) Hours As Needed 30 tablet 0    • Multiple Vitamins-Minerals (MULTIVITAMIN MEN 50+ PO) Take  by mouth Daily.   10/29/2018 at 0800   • nystatin (MYCOSTATIN) 627424 UNIT/GM ointment Apply  topically to the appropriate area as directed 2 (Two) Times a Day. Treat for seven days 30 g 0 10/29/2018   • raNITIdine (ZANTAC) 300 MG tablet Take 300 mg by mouth As Needed for Indigestion or Heartburn.   Past Month at Unknown time   • rivaroxaban (XARELTO) 10 MG tablet Take 1 tablet by mouth Daily. Resume after follow-up with Dr. Miracle Bustillos 30 tablet     •  "saccharomyces boulardii (FLORASTOR) 250 MG capsule Take 1 capsule by mouth 2 (Two) Times a Day. (Patient taking differently: Take 250 mg by mouth As Needed.) 30 capsule 0 Past Week at Unknown time   • tamsulosin (FLOMAX) 0.4 MG capsule 24 hr capsule Take 1 capsule by mouth Daily. 30 capsule 0 10/29/2018 at 0800   • triamterene-hydrochlorothiazide (MAXZIDE-25) 37.5-25 MG per tablet Take 1 tablet by mouth Daily.   10/30/2018 at 0500   • vitamin B-12 (CYANOCOBALAMIN) 1000 MCG tablet Take 1,000 mcg by mouth Daily.   10/29/2018 at 0800    and Allergies:  Patient has no known allergies.    Objective     Vital Signs   Blood pressure 140/78, pulse 75, temperature 98.8 °F (37.1 °C), temperature source Oral, resp. rate 18, height 165.1 cm (65\"), weight 63.5 kg (140 lb), SpO2 95 %.    Physical Exam:      Gen: Sitting up in bed with eyes open. In NAD. Appears stated age   Eyes: PERRL, conjuntivae/lids normal   ENT: External canals normal bilaterally. Oropharynx normal.    Neck: Supple. No thyroid enlargement noted   Respiratory: CTA bilaterally. Respirations unlabored   CV: Irregular rhythm, S1 and S2 nml. Radial pulses 2+ bilaterally.    Skin: No rashes noted on exposed skin. Normal turgor.   MSK: Normal bulk and tone. Nml ROM     Neurologic:   Mental status: Awake, alert, oriented x3. Follows commands. Speech fluent.    CN: PERRL, EOM intact, sensation intact in upper/mid/lower face bilaterally, facial movements symmetric, hearing intact to finger rub bilaterally, palate elevates symmetrically, tongue movements and SCMs strong bilaterally    Motor: Strength full (5/5) throughout in BUE and BLE    Reflexes: 2+ in the upper extremity is bilaterally and 1+ patellar reflexes bilaterally.  Unable to elicit ankle jerk due to poor patient cooperation as he continued to actively dorsiflex his feet.  No clonus noted   Sensory: Intact to LT and temperature throughout   Coordination: No dysmetria noted   Gait: Not tested        Results " Reviewed:     Labs reviewed  EKG report reviewed                 Assessment/Plan     1.  Bilateral leg pain = concerning for referred pain from lumbar degenerative disc disease versus bilateral sciatic neuropathy.  Recommend reattempting MRI L-spine with pain control if possible.  We will send for EMG/NCV in AM. Trial of gabapentin 100mg TID (titrating as tolerated). PT/OT eval.        Fatemeh Ma MD  12/02/18  2:07 PM

## 2018-12-02 NOTE — PROGRESS NOTES
"Adult Nutrition  Assessment/PES    Patient Name:  Sandip Feliz  YOB: 1931  MRN: [id number]  Admit Date:  12/1/2018    Assessment Date:  12/2/2018      Reason for Assessment     Row Name 12/02/18 1221          Reason for Assessment    Reason For Assessment  -- nsg adm database documentation of \"unsure\" as r/t wt changes prior to adm.  30 mins     Diagnosis  -- UTI, Bilat LE pain, urinary retention. H/o preDM, Afib, HTN.         Nutrition/Diet History     Row Name 12/02/18 1225          Nutrition/Diet History    Factors Affecting Nutritional Intake  -- Wife estimates pt's wt at 140lb. Wife states pt has not been eating well during past 2 weeks, and estimates pt eating less than 1/2 of pt's nl po intake during this time. Wife notes pt is eating well during adm thus far, and better than at home.          Anthropometrics     Row Name 12/02/18 1223          Anthropometrics    Weight  -- ht=65in, fn=943dz per stated wt. Noted epic wt data on 10/22/18 standing wt of 143lb; wife verfies. Wife states wt is approx same as in Oct 2018.  Rec obtain current wt this adm to assess for any changes.      Labs reviewed.           Nutrition Prescription Ordered     Row Name 12/02/18 1226          Nutrition Prescription PO    Common Modifiers  Cardiac         Evaluation of Received Nutrient/Fluid Intake     Row Name 12/02/18 1226          PO Evaluation    Number of Days PO Intake Evaluated  Insufficient Data               Problem/Interventions:  Problem 1     Row Name 12/02/18 1226          Nutrition Diagnoses Problem 1    Problem 1  Predicted Suboptimal Intake     Etiology (related to)  -- less than nl appetite during past 2 weeks     Signs/Symptoms (evidenced by)  Report of Mnimal PO Intake per wife                 Intervention Goal     Row Name 12/02/18 1227          Intervention Goal    PO  Establish PO         Nutrition Intervention     Row Name 12/02/18 1227          Nutrition Intervention    RD/Tech Action  " Follow Tx progress;Interview for preference;Encourage intake           Education/Evaluation     Row Name 12/02/18 1227          Monitor/Evaluation    Monitor  Per protocol           Electronically signed by:  Nereida Wise MS,RD,LD  12/02/18 12:27 PM

## 2018-12-02 NOTE — PROGRESS NOTES
[facility] Medicine Services  PROGRESS NOTE    Patient Name: Sandip Feliz  : 1931  MRN: [id number]    Date of Admission: 2018  Length of Stay: 0  Primary Care Physician: Kelly Robison MD    Subjective   Subjective     CC:  Weakness, leg pain    HPI:  Pt admitted earlier this am by my colleague, Dr. Valencia. Denies any issues currently but still cannot walk.     Review of Systems  Gen- No fevers, chills  CV- No chest pain, palpitations  Resp- No cough, dyspnea  GI- No N/V/D, abd pain  Otherwise ROS is negative except as mentioned in the HPI.    Objective   Objective     Vital Signs:   Temp:  [97.7 °F (36.5 °C)-98.8 °F (37.1 °C)] 98.8 °F (37.1 °C)  Heart Rate:  [] 75  Resp:  [16-18] 18  BP: (117-147)/(61-93) 140/78        Physical Exam:  Constitutional: No acute distress, awake, alert  HENT: NCAT, mucous membranes moist  Respiratory: Clear to auscultation bilaterally, respiratory effort normal   Cardiovascular: RRR, no murmurs, rubs, or gallops, palpable pedal pulses bilaterally  Gastrointestinal: Positive bowel sounds, soft, nontender, nondistended  Musculoskeletal: No bilateral ankle edema  Psychiatric: Appropriate affect, cooperative  Neurologic: Oriented x 3, Cranial Nerves grossly intact to confrontation, speech clear  Skin: No rashes    Results Reviewed:  I have personally reviewed current lab, radiology, and data and agree.    Results from last 7 days   Lab Units  18   0721  18   1405   WBC 10*3/mm3  8.19  7.06   HEMOGLOBIN g/dL  10.3*  10.5*   HEMATOCRIT %  33.6*  33.8*   PLATELETS 10*3/mm3  377  386     Results from last 7 days   Lab Units  18   0721  18   1405   SODIUM mmol/L  133  126*   POTASSIUM mmol/L  4.0  3.8   CHLORIDE mmol/L  101  97*   CO2 mmol/L  23.0  22.0   BUN mg/dL  14  17   CREATININE mg/dL  0.75  0.89   GLUCOSE mg/dL  112*  116*   CALCIUM mg/dL  8.6*  8.9   ALT (SGPT) U/L   --   17   AST (SGOT) U/L   --   23      Estimated Creatinine Clearance: 58.4 mL/min (by C-G formula based on SCr of 0.75 mg/dL).  BNP   Date Value Ref Range Status   12/01/2018 39.0 0.0 - 100.0 pg/mL Final     Comment:     Results may be falsely decreased if patient taking Biotin.       Microbiology Results Abnormal     Procedure Component Value - Date/Time    Urine Culture - Urine, Urine, Clean Catch [[id number]]  (Abnormal) Collected:  12/01/18 1402    Lab Status:  Preliminary result Specimen:  Urine, Clean Catch Updated:  12/02/18 0703     Urine Culture 20,000-30,000 CFU/mL Gram Negative Bacilli          Imaging Results (last 24 hours)     Procedure Component Value Units Date/Time    XR Chest 1 View [[id number]] Collected:  12/01/18 1631     Updated:  12/01/18 1631    Narrative:          EXAMINATION: XR CHEST 1 VW - 12/01/2018     INDICATION: Shortness of air.     COMPARISON: 10/22/2018     FINDINGS: Portable chest reveals the heart to be enlarged. Underlying  chronic changes seen in lung fields bilaterally with slight prominence  of the pulmonary vascularity. Degenerative changes seen within the  spine. No pleural effusion or pneumothorax. Chronic changes seen within  the lung bases. Slight prominence of the pulmonary vascularity.           Impression:       Enlargement of the heart with slight prominence of the  pulmonary vascularity. No evidence of acute parenchymal disease.     DICTATED:   12/01/2018  EDITED/ls :   12/01/2018               Results for orders placed in visit on 05/24/16   SCANNED - ECHOCARDIOGRAM       I have reviewed the medications.      amLODIPine-lisinopril 5-20 mg combo dose  Oral Q24H   ceftriaxone 1 g Intravenous Q24H   docusate sodium 100 mg Oral BID   finasteride 5 mg Oral Daily   saccharomyces boulardii 250 mg Oral BID   sodium chloride 3 mL Intravenous Q12H   vitamin B-12 1,000 mcg Oral Daily         Assessment/Plan   Assessment / Plan     Active Hospital Problems    Diagnosis   • **Generalized weakness   • Acute UTI  "  • Acute UTI (urinary tract infection)   • Anemia   • Pain in both lower extremities   • Hyponatremia   • Urinary retention   • Prediabetes   • Paroxysmal atrial fibrillation (CMS/HCC)   • HTN (hypertension)          Brief Hospital Course to date:  Sandip Feliz is a 87 y.o. male with PMH of HTN, BPH, chronic pain, GERD, Afib on chronic Xarelto but has been on hold due to hematuria, and recent admission in October for urinary retention s/p reduction of paraphimosis and cystoscopy with clot evacuation by Dr. Rausch with removal of Shaffer approximately three weeks ago who presents with complaints of generalized weakness and acute urinary retention.        Assessment & Plan:  --Generalized weakness- Neurology consulted, pt unable to tolerate CT L-spine in the ED due to pain.  Would benefit from MRI, but doubt he could tolerate this at this time.  Will also ask PT/OT to see.   --UTI- await urine culture results.  Continue IV ABX.   --Hyponatremia- holding HCTZ, also could be chronic from EtOH use (drinks \"one drink of vodka\" daily).  Monitor  --Urinary retention- s/p Shaffer placement, consulted Urology.   --PAF- continue rate control, Xarelto on hold per Urology's recs last admission.   --HTN- continue home meds    DVT Prophylaxis:  mechanical    CODE STATUS:   Code Status and Medical Interventions:   Ordered at: 12/01/18 2021     Level Of Support Discussed With:    Patient     Code Status:    CPR     Medical Interventions (Level of Support Prior to Arrest):    Full       Disposition: I expect the patient to be discharged TBD      Electronically signed by Flavia Pavon MD, 12/02/18, 10:44 AM.    "

## 2018-12-02 NOTE — CONSULTS
Urology Consult    Referring Provider: Librado  Reason for Consultation:Urinary retention    Patient Care Team:  Kelly Robison MD as PCP - General (Family Medicine)  Kelly Robison MD as PCP - Claims Attributed    Chief complaint unable to void    Subjective .     History of present illness: A 87-year-old gentleman in acute urinary retention.  Patient's previous the undergone a laser vaporization of the prostate.  Patient's catheter was removed in October and he has been voiding well until recently.  He's been having some dysuria and increased urinary frequency.  There is been no hematuria.  Currently Shaffer catheters in place and antibiotics have been started for urinary tract infection    Review of Systems  Pertinent items are noted in HPI    History  Past Medical History:   Diagnosis Date   • A-fib (CMS/HCC)    • Arthritis    • Shaffer catheter in place    • GERD (gastroesophageal reflux disease)    • Hearing impairment    • Hearing loss     both ears hearing aides   • Heartburn    • Hypertension    • Melanoma (CMS/[facility])    • Wears dentures    • Wears dentures     partial bottom full at top   • Wears glasses    • Wears hearing aid        Objective     Vital Signs   Temp:  [97.7 °F (36.5 °C)-98.8 °F (37.1 °C)] 98.8 °F (37.1 °C)  Heart Rate:  [] 75  Resp:  [16-18] 18  BP: (117-147)/(61-93) 140/78    Physical Exam:     General Appearance:    Alert, cooperative, in no acute distress   Head:    Normocephalic, without obvious abnormality, atraumatic   Eyes:            Lids and lashes normal, conjunctivae and sclerae normal, no   icterus, no pallor, corneas clear, PERRLA   Ears:    Ears appear intact with no abnormalities noted   Throat:   No oral lesions, no thrush, oral mucosa moist   Neck:   No adenopathy, supple, trachea midline, no thyromegaly, no   carotid bruit, no JVD   Back:     No kyphosis present, no scoliosis present, no skin lesions,      erythema or scars, no tenderness to percussion or                    palpation,   range of motion normal   Lungs:     Clear to auscultation,respirations regular, even and                  unlabored    Heart:    Regular rhythm and normal rate, normal S1 and S2, no            murmur, no gallop, no rub, no click   Chest Wall:    No abnormalities observed   Abdomen:     Normal bowel sounds, no masses, no organomegaly, soft        non-tender, non-distended, no guarding, no rebound                tenderness   Rectal:     Deferred   Extremities:   Moves all extremities well, no edema, no cyanosis, no             redness   Pulses:   Pulses palpable and equal bilaterally   Skin:   No bleeding, bruising or rash   Lymph nodes:   No palpable adenopathy   Neurologic:   Cranial nerves 2 - 12 grossly intact, sensation intact, DTR       present and equal bilaterally       Results Review:   I reviewed the patient's new clinical results.    Lab Results (last 72 hours)     Procedure Component Value Units Date/Time    Vitamin B12 [[id number]]  (Normal) Collected:  12/02/18 0721    Specimen:  Blood Updated:  12/02/18 0816     Vitamin B-12 676 pg/mL     Folate [[id number]]  (Abnormal) Collected:  12/02/18 0721    Specimen:  Blood Updated:  12/02/18 0816     Folate >24.00 ng/mL      Comment: Results may be falsely increased if patient taking Biotin.       Narrative:       Folate Reference Ranges:    Deficient:            Less than 1.2 ng/mL  Indeterminant:        1.2-3.1 ng/mL  Normal:               3.2-20.0 ng/mL    Basic Metabolic Panel [[id number]]  (Abnormal) Collected:  12/02/18 0721    Specimen:  Blood Updated:  12/02/18 0816     Glucose 112 mg/dL      BUN 14 mg/dL      Creatinine 0.75 mg/dL      Sodium 133 mmol/L      Potassium 4.0 mmol/L      Chloride 101 mmol/L      CO2 23.0 mmol/L      Calcium 8.6 mg/dL      eGFR Non African Amer 99 mL/min/1.73      BUN/Creatinine Ratio 18.7     Anion Gap 9.0 mmol/L     Narrative:       National Kidney Foundation Guidelines    Stage     Description         GFR  1         Normal or High     90+  2         Mild decrease      60-89  3         Moderate decrease  30-59  4         Severe decrease    15-29  5         Kidney failure     <15    The MDRD GFR formula is only valid for adults with stable renal function between ages 18 and 70.    TSH [[id number]]  (Normal) Collected:  12/02/18 0721    Specimen:  Blood Updated:  12/02/18 0816     TSH 0.971 mIU/mL     Hemoglobin A1c [[id number]]  (Abnormal) Collected:  12/02/18 0721    Specimen:  Blood Updated:  12/02/18 0806     Hemoglobin A1C 6.70 %     Narrative:       The American Diabetes Association recommends maintenance of Hemoglobin A1C at 7.0% or lower. Goals for Hemoglobin A1C reduction may need to be modified if hypoglycemia is a problem.    CBC (No Diff) [[id number]]  (Abnormal) Collected:  12/02/18 0721    Specimen:  Blood Updated:  12/02/18 0757     WBC 8.19 10*3/mm3      RBC 4.02 10*6/mm3      Hemoglobin 10.3 g/dL      Hematocrit 33.6 %      MCV 83.6 fL      MCH 25.6 pg      MCHC 30.7 g/dL      RDW 15.3 %      RDW-SD 46.9 fl      MPV 9.2 fL      Platelets 377 10*3/mm3     Urine Culture - Urine, Urine, Clean Catch [[id number]]  (Abnormal) Collected:  12/01/18 1402    Specimen:  Urine, Clean Catch Updated:  12/02/18 0703     Urine Culture 20,000-30,000 CFU/mL Gram Negative Bacilli    Cortisol [[id number]] Collected:  12/01/18 2300    Specimen:  Blood Updated:  12/02/18 0636     Cortisol 12.50 mcg/dL     Sodium, Urine, Random - Urine, Clean Catch [[id number]]  (Abnormal) Collected:  12/01/18 2131    Specimen:  Urine, Clean Catch Updated:  12/01/18 2139     Sodium, Urine 94 mmol/L     Osmolality, Urine - Urine, Clean Catch [[id number]]  (Normal) Collected:  12/01/18 2131    Specimen:  Urine, Clean Catch Updated:  12/01/18 2137     Osmolality, Urine 422 mOsm/kg     Osmolality, Serum [[id number]]  (Normal) Collected:  12/01/18 2053    Specimen:  Blood Updated:  12/01/18 2116     Osmolality 282 mOsm/kg     Comprehensive Metabolic  Panel [[id number]]  (Abnormal) Collected:  12/01/18 1405    Specimen:  Blood Updated:  12/01/18 1450     Glucose 116 mg/dL      BUN 17 mg/dL      Creatinine 0.89 mg/dL      Sodium 126 mmol/L      Potassium 3.8 mmol/L      Chloride 97 mmol/L      CO2 22.0 mmol/L      Calcium 8.9 mg/dL      Total Protein 6.8 g/dL      Albumin 4.23 g/dL      ALT (SGPT) 17 U/L      AST (SGOT) 23 U/L      Alkaline Phosphatase 107 U/L      Total Bilirubin 0.4 mg/dL      eGFR Non African Amer 81 mL/min/1.73      Globulin 2.6 gm/dL      A/G Ratio 1.6 g/dL      BUN/Creatinine Ratio 19.1     Anion Gap 7.0 mmol/L     Narrative:       National Kidney Foundation Guidelines    Stage     Description        GFR  1         Normal or High     90+  2         Mild decrease      60-89  3         Moderate decrease  30-59  4         Severe decrease    15-29  5         Kidney failure     <15    The MDRD GFR formula is only valid for adults with stable renal function between ages 18 and 70.    BNP [[id number]]  (Normal) Collected:  12/01/18 1405    Specimen:  Blood Updated:  12/01/18 1449     BNP 39.0 pg/mL      Comment: Results may be falsely decreased if patient taking Biotin.       Urinalysis With Culture If Indicated - Urine, Clean Catch [[id number]]  (Abnormal) Collected:  12/01/18 1402    Specimen:  Urine, Clean Catch Updated:  12/01/18 1446     Color, UA Dark Yellow     Appearance, UA Turbid     pH, UA 5.5     Specific Gravity, UA 1.011     Glucose, UA Negative     Ketones, UA Trace     Bilirubin, UA Negative     Blood, UA Large (3+)     Protein, UA Trace     Leuk Esterase, UA Large (3+)     Nitrite, UA Positive     Urobilinogen, UA 0.2 E.U./dL    Urinalysis, Microscopic Only - Urine, Clean Catch [[id number]]  (Abnormal) Collected:  12/01/18 1402    Specimen:  Urine, Clean Catch Updated:  12/01/18 1446     RBC, UA 21-30 /HPF      WBC, UA Too Numerous to Count /HPF      Bacteria, UA 1+ /HPF      Squamous Epithelial Cells, UA 0-2 /HPF      Hyaline Casts,  UA 0-6 /LPF      Methodology Manual Light Microscopy    CBC & Differential [[id number]] Collected:  12/01/18 1405    Specimen:  Blood Updated:  12/01/18 1426    Narrative:       The following orders were created for panel order CBC & Differential.  Procedure                               Abnormality         Status                     ---------                               -----------         ------                     CBC Auto Differential[[id number]]        Abnormal            Final result                 Please view results for these tests on the individual orders.    CBC Auto Differential [[id number]]  (Abnormal) Collected:  12/01/18 1405    Specimen:  Blood Updated:  12/01/18 1426     WBC 7.06 10*3/mm3      RBC 4.11 10*6/mm3      Hemoglobin 10.5 g/dL      Hematocrit 33.8 %      MCV 82.2 fL      MCH 25.5 pg      MCHC 31.1 g/dL      RDW 14.9 %      RDW-SD 45.2 fl      MPV 9.2 fL      Platelets 386 10*3/mm3      Neutrophil % 73.3 %      Lymphocyte % 12.2 %      Monocyte % 11.8 %      Eosinophil % 2.1 %      Basophil % 0.6 %      Immature Grans % 0.3 %      Neutrophils, Absolute 5.18 10*3/mm3      Lymphocytes, Absolute 0.86 10*3/mm3      Monocytes, Absolute 0.83 10*3/mm3      Eosinophils, Absolute 0.15 10*3/mm3      Basophils, Absolute 0.04 10*3/mm3      Immature Grans, Absolute 0.02 10*3/mm3     POCT Urinalysis, dipstick [[id number]]  (Abnormal) Collected:  12/01/18 1414    Specimen:  Urine Updated:  12/01/18 1416     Color Straw     Clarity, UA Cloudy     Glucose, UA Negative mg/dL      Bilirubin Negative     Ketones, UA Trace     Specific Gravity  1.025     Blood, UA 3+     pH, Urine 6.0     Protein, POC Trace mg/dL      Urobilinogen, UA Normal     Leukocytes Moderate (2+)     Nitrite, UA Positive        Imaging Results (last 24 hours)     Procedure Component Value Units Date/Time    XR Chest 1 View [[id number]] Collected:  12/01/18 1631     Updated:  12/02/18 1104    Narrative:          EXAMINATION: XR CHEST 1 VW -  12/01/2018     INDICATION: Shortness of air.     COMPARISON: 10/22/2018     FINDINGS: Portable chest reveals the heart to be enlarged. Underlying  chronic changes seen in lung fields bilaterally with slight prominence  of the pulmonary vascularity. Degenerative changes seen within the  spine. No pleural effusion or pneumothorax. Chronic changes seen within  the lung bases. Slight prominence of the pulmonary vascularity.           Impression:       Enlargement of the heart with slight prominence of the  pulmonary vascularity. No evidence of acute parenchymal disease.     DICTATED:   12/01/2018  EDITED/ls :   12/01/2018      This report was finalized on 12/2/2018 11:02 AM by Dr. Fozia Franco MD.             Medications:  Current Facility-Administered Medications   Medication Dose Route Frequency Provider Last Rate Last Dose   • acetaminophen (TYLENOL) tablet 1,000 mg  1,000 mg Oral Q6H PRN Hillary Bowie APRN       • amLODIPine (NORVASC) 5 mg, lisinopril (PRINIVIL,ZESTRIL) 20 mg   Oral Q24H Hillary Bowie APRN       • cefTRIAXone (ROCEPHIN) IVPB 1 g  1 g Intravenous Q24H Hillary Bowie APRN 100 mL/hr at 12/02/18 0828 1 g at 12/02/18 0828   • docusate sodium (COLACE) capsule 100 mg  100 mg Oral BID Hillary Bowie APRN   100 mg at 12/02/18 0825   • finasteride (PROSCAR) tablet 5 mg  5 mg Oral Daily Hillary Bowie APRN   5 mg at 12/02/18 0825   • HYDROcodone-acetaminophen (NORCO) 5-325 MG per tablet 1 tablet  1 tablet Oral Q6H PRN Hillary Boiwe APRN   1 tablet at 12/02/18 0828   • saccharomyces boulardii (FLORASTOR) capsule 250 mg  250 mg Oral BID Hillary Bowie APRN   250 mg at 12/02/18 0825   • sodium chloride 0.9 % flush 10 mL  10 mL Intravenous PRN Sea Sierra MD       • sodium chloride 0.9 % flush 3 mL  3 mL Intravenous Q12H Hillary Bowie APRN   3 mL at 12/01/18 2148   • sodium chloride 0.9 % flush 3-10 mL  3-10 mL Intravenous PRN Jamir,  PITO Conn       • sodium chloride 0.9 % infusion  75 mL/hr Intravenous Continuous Hillary Bowie APRN 75 mL/hr at 12/02/18 1218 75 mL/hr at 12/02/18 1218   • vitamin B-12 (CYANOCOBALAMIN) tablet 1,000 mcg  1,000 mcg Oral Daily Hillary Bowie APRN   1,000 mcg at 12/02/18 0825       Assessment/Plan       Generalized weakness    HTN (hypertension)    Paroxysmal atrial fibrillation (CMS/HCC)    Urinary retention    Prediabetes    Hyponatremia    Acute UTI (urinary tract infection)    Anemia    Pain in both lower extremities    Acute UTI      Impression: Urinary retention and UTI    Plan: Continue catheter and antibiotics for now    I discussed the patients findings and my recommendations with patient and family    Ten Child MD  12/02/18  12:53 PM

## 2018-12-03 ENCOUNTER — APPOINTMENT (OUTPATIENT)
Dept: NEUROLOGY | Facility: HOSPITAL | Age: 83
End: 2018-12-03
Attending: PSYCHIATRY & NEUROLOGY

## 2018-12-03 LAB
ANION GAP SERPL CALCULATED.3IONS-SCNC: 10 MMOL/L (ref 3–11)
BACTERIA SPEC AEROBE CULT: ABNORMAL
BACTERIA SPEC AEROBE CULT: ABNORMAL
BUN BLD-MCNC: 9 MG/DL (ref 9–23)
BUN/CREAT SERPL: 13.8 (ref 7–25)
CALCIUM SPEC-SCNC: 8.3 MG/DL (ref 8.7–10.4)
CHLORIDE SERPL-SCNC: 102 MMOL/L (ref 99–109)
CO2 SERPL-SCNC: 21 MMOL/L (ref 20–31)
CREAT BLD-MCNC: 0.65 MG/DL (ref 0.6–1.3)
DEPRECATED RDW RBC AUTO: 46.3 FL (ref 37–54)
ERYTHROCYTE [DISTWIDTH] IN BLOOD BY AUTOMATED COUNT: 15.2 % (ref 11.3–14.5)
GFR SERPL CREATININE-BSD FRML MDRD: 116 ML/MIN/1.73
GLUCOSE BLD-MCNC: 115 MG/DL (ref 70–100)
HCT VFR BLD AUTO: 33.1 % (ref 38.9–50.9)
HGB BLD-MCNC: 10.2 G/DL (ref 13.1–17.5)
MCH RBC QN AUTO: 25.5 PG (ref 27–31)
MCHC RBC AUTO-ENTMCNC: 30.8 G/DL (ref 32–36)
MCV RBC AUTO: 82.8 FL (ref 80–99)
PLATELET # BLD AUTO: 363 10*3/MM3 (ref 150–450)
PMV BLD AUTO: 9.4 FL (ref 6–12)
POTASSIUM BLD-SCNC: 3.5 MMOL/L (ref 3.5–5.5)
RBC # BLD AUTO: 4 10*6/MM3 (ref 4.2–5.76)
SODIUM BLD-SCNC: 133 MMOL/L (ref 132–146)
WBC NRBC COR # BLD: 8.45 10*3/MM3 (ref 3.5–10.8)

## 2018-12-03 PROCEDURE — 85027 COMPLETE CBC AUTOMATED: CPT | Performed by: INTERNAL MEDICINE

## 2018-12-03 PROCEDURE — 99233 SBSQ HOSP IP/OBS HIGH 50: CPT | Performed by: INTERNAL MEDICINE

## 2018-12-03 PROCEDURE — 95910 NRV CNDJ TEST 7-8 STUDIES: CPT

## 2018-12-03 PROCEDURE — 25010000002 CEFTRIAXONE PER 250 MG: Performed by: NURSE PRACTITIONER

## 2018-12-03 PROCEDURE — 80048 BASIC METABOLIC PNL TOTAL CA: CPT | Performed by: INTERNAL MEDICINE

## 2018-12-03 PROCEDURE — 99231 SBSQ HOSP IP/OBS SF/LOW 25: CPT | Performed by: PSYCHIATRY & NEUROLOGY

## 2018-12-03 PROCEDURE — 95886 MUSC TEST DONE W/N TEST COMP: CPT

## 2018-12-03 RX ORDER — THIAMINE MONONITRATE (VIT B1) 100 MG
100 TABLET ORAL DAILY
Status: DISCONTINUED | OUTPATIENT
Start: 2018-12-03 | End: 2018-12-06 | Stop reason: HOSPADM

## 2018-12-03 RX ORDER — POTASSIUM CHLORIDE 1.5 G/1.77G
40 POWDER, FOR SOLUTION ORAL AS NEEDED
Status: DISCONTINUED | OUTPATIENT
Start: 2018-12-03 | End: 2018-12-06 | Stop reason: HOSPADM

## 2018-12-03 RX ORDER — POTASSIUM CHLORIDE 7.45 MG/ML
10 INJECTION INTRAVENOUS
Status: DISCONTINUED | OUTPATIENT
Start: 2018-12-03 | End: 2018-12-06 | Stop reason: HOSPADM

## 2018-12-03 RX ORDER — POTASSIUM CHLORIDE 750 MG/1
40 CAPSULE, EXTENDED RELEASE ORAL AS NEEDED
Status: DISCONTINUED | OUTPATIENT
Start: 2018-12-03 | End: 2018-12-06 | Stop reason: HOSPADM

## 2018-12-03 RX ADMIN — HYDROCODONE BITARTRATE AND ACETAMINOPHEN 1 TABLET: 5; 325 TABLET ORAL at 01:09

## 2018-12-03 RX ADMIN — SODIUM CHLORIDE, PRESERVATIVE FREE 3 ML: 5 INJECTION INTRAVENOUS at 21:30

## 2018-12-03 RX ADMIN — Medication 250 MG: at 20:43

## 2018-12-03 RX ADMIN — GABAPENTIN 100 MG: 100 CAPSULE ORAL at 14:51

## 2018-12-03 RX ADMIN — CYANOCOBALAMIN TAB 1000 MCG 1000 MCG: 1000 TAB at 08:39

## 2018-12-03 RX ADMIN — POTASSIUM CHLORIDE 40 MEQ: 750 CAPSULE, EXTENDED RELEASE ORAL at 14:08

## 2018-12-03 RX ADMIN — Medication 250 MG: at 08:39

## 2018-12-03 RX ADMIN — GABAPENTIN 100 MG: 100 CAPSULE ORAL at 20:43

## 2018-12-03 RX ADMIN — SODIUM CHLORIDE, PRESERVATIVE FREE 3 ML: 5 INJECTION INTRAVENOUS at 08:41

## 2018-12-03 RX ADMIN — LISINOPRIL: 20 TABLET ORAL at 08:39

## 2018-12-03 RX ADMIN — SODIUM CHLORIDE 75 ML/HR: 9 INJECTION, SOLUTION INTRAVENOUS at 14:08

## 2018-12-03 RX ADMIN — GABAPENTIN 100 MG: 100 CAPSULE ORAL at 05:38

## 2018-12-03 RX ADMIN — POTASSIUM CHLORIDE 40 MEQ: 750 CAPSULE, EXTENDED RELEASE ORAL at 10:57

## 2018-12-03 RX ADMIN — FINASTERIDE 5 MG: 5 TABLET, FILM COATED ORAL at 08:40

## 2018-12-03 RX ADMIN — Medication 100 MG: at 14:51

## 2018-12-03 RX ADMIN — CEFTRIAXONE SODIUM 1 G: 1 INJECTION, SOLUTION INTRAVENOUS at 08:40

## 2018-12-03 NOTE — PROGRESS NOTES
Continued Stay Note  [facility]     Patient Name: Sandip Feliz  MRN: [id number]  Today's Date: 12/3/2018    Admit Date: 12/1/2018    Discharge Plan     Row Name 12/03/18 1052       Plan    Plan  Plan is currently home with spouse, may need home health or rehab at discharge.  PT and OT eval pending    Patient/Family in Agreement with Plan  yes    Plan Comments  Plan being developed and is dependent on hospital medical course and needs as D/C approaches    Final Discharge Disposition Code  06 - home with home health care        Discharge Codes    No documentation.             Narda Aleman RN

## 2018-12-03 NOTE — SIGNIFICANT NOTE
RN reports pt hx in chart of consistent ETOH use 1-2 drinks a night (unclear if more). She states he is twitchy/tremory sleeping overnight. No other changes.    1. Can add CIWA but will hold off on adding the ativan and defer that to rounding providers.    Please f/u PRN.

## 2018-12-03 NOTE — PROGRESS NOTES
[facility]  PROGRESS NOTE    Patient Name: Sandip Feliz  : 1931  MRN: [id number]    Date of Admission: 2018  Length of Stay: 1  Primary Care Physician: Kelly Robison MD    Subjective   Subjective     CC:  Weakness, leg pain    HPI:  Pt's wife reports that he was confused this am when he woke up but that he didn't sleep well. Otherwise, no other issues and he is back to his baseline now.  Getting EMG at bedside when I saw him.     Review of Systems  Gen- No fevers, chills  CV- No chest pain, palpitations  Resp- No cough, dyspnea  GI- No N/V/D, abd pain  Otherwise ROS is negative except as mentioned in the HPI.    Objective   Objective     Vital Signs:   Temp:  [98.1 °F (36.7 °C)-98.8 °F (37.1 °C)] 98.1 °F (36.7 °C)  Heart Rate:  [] 81  Resp:  [18] 18  BP: (128-148)/(68-75) 139/75        Physical Exam:  Constitutional: No acute distress, awake, alert  HENT: NCAT, mucous membranes moist  Respiratory: Clear to auscultation bilaterally, respiratory effort normal   Cardiovascular: RRR, no murmurs, rubs, or gallops, palpable pedal pulses bilaterally  Gastrointestinal: Positive bowel sounds, soft, nontender, nondistended  Musculoskeletal: No bilateral ankle edema  Psychiatric: Appropriate affect, cooperative  Neurologic: Oriented x 3, Cranial Nerves grossly intact to confrontation, speech clear  Skin: No rashes    Results Reviewed:  I have personally reviewed current lab, radiology, and data and agree.    Results from last 7 days   Lab Units  18   0521  18   0721  18   1405   WBC 10*3/mm3  8.45  8.19  7.06   HEMOGLOBIN g/dL  10.2*  10.3*  10.5*   HEMATOCRIT %  33.1*  33.6*  33.8*   PLATELETS 10*3/mm3  363  377  386     Results from last 7 days   Lab Units  18   0522  18   0721  18   1405   SODIUM mmol/L  133  133  126*   POTASSIUM mmol/L  3.5  4.0  3.8   CHLORIDE mmol/L  102  101  97*   CO2 mmol/L  21.0  23.0  22.0    BUN mg/dL  9  14  17   CREATININE mg/dL  0.65  0.75  0.89   GLUCOSE mg/dL  115*  112*  116*   CALCIUM mg/dL  8.3*  8.6*  8.9   ALT (SGPT) U/L   --    --   17   AST (SGOT) U/L   --    --   23     Estimated Creatinine Clearance: 58.4 mL/min (by C-G formula based on SCr of 0.65 mg/dL).  BNP   Date Value Ref Range Status   12/01/2018 39.0 0.0 - 100.0 pg/mL Final     Comment:     Results may be falsely decreased if patient taking Biotin.       Microbiology Results Abnormal     Procedure Component Value - Date/Time    Urine Culture - Urine, Urine, Clean Catch [[id number]]  (Abnormal)  (Susceptibility) Collected:  12/01/18 1402    Lab Status:  Final result Specimen:  Urine, Clean Catch Updated:  12/03/18 1141     Urine Culture 20,000-30,000 CFU/mL Escherichia coli      20,000-30,000 CFU/mL Normal Urogenital Hailey    Susceptibility      Escherichia coli     QUIN     Ampicillin Resistant     Ampicillin + Sulbactam Resistant     Aztreonam Susceptible     Cefepime Susceptible     Cefotaxime Susceptible     Ceftriaxone Susceptible     Cefuroxime sodium Susceptible     Cephalothin Intermediate     Ciprofloxacin Resistant     Ertapenem Susceptible     Gentamicin Susceptible     Levofloxacin Resistant     Meropenem Susceptible     Nitrofurantoin Susceptible     Piperacillin + Tazobactam Susceptible     Tetracycline Susceptible     Tobramycin Susceptible     Trimethoprim + Sulfamethoxazole Susceptible                          Imaging Results (last 24 hours)     Procedure Component Value Units Date/Time    MRI Outside Films [[id number]] Resulted:  12/02/18 1434     Updated:  12/02/18 1434    Narrative:       This procedure was auto-finalized with no dictation required.        Results for orders placed during the hospital encounter of 12/01/18   Adult Transthoracic Echo Complete W/ Cont if Necessary Per Protocol    Narrative · Left ventricular systolic function is normal.  · Estimated EF appears to be in the range of 56 - 60%  · Left  "ventricular diastolic dysfunction (grade I) consistent with   impaired relaxation.  · Trace to mild tricuspid valve regurgitation is present. Estimated right   ventricular systolic pressure from tricuspid regurgitation is normal (<35   mmHg)          I have reviewed the medications.      amLODIPine-lisinopril 5-20 mg combo dose  Oral Q24H   ceftriaxone 1 g Intravenous Q24H   docusate sodium 100 mg Oral BID   finasteride 5 mg Oral Daily   gabapentin 100 mg Oral Q8H   saccharomyces boulardii 250 mg Oral BID   sodium chloride 3 mL Intravenous Q12H   vitamin B-12 1,000 mcg Oral Daily         Assessment/Plan   Assessment / Plan     Active Hospital Problems    Diagnosis   • **Generalized weakness   • Acute UTI   • Acute UTI (urinary tract infection)   • Anemia   • Pain in both lower extremities   • Hyponatremia   • Urinary retention   • Prediabetes   • Paroxysmal atrial fibrillation (CMS/HCC)   • HTN (hypertension)          Brief Hospital Course to date:  Sandip Feliz is a 87 y.o. male with PMH of HTN, BPH, chronic pain, GERD, Afib on chronic Xarelto but has been on hold due to hematuria, and recent admission in October for urinary retention s/p reduction of paraphimosis and cystoscopy with clot evacuation by Dr. Rausch with removal of Shaffer approximately three weeks ago who presents with complaints of generalized weakness and acute urinary retention.        Assessment & Plan:  --Generalized weakness- Neurology consulted, pt unable to tolerate CT L-spine in the ED due to pain.  Would benefit from MRI, but doubt he could tolerate this at this time.  Will also ask PT/OT to see. EMG today per Neurology  --UTI- UCx only growing 20-30K E coli (susceptible to Rocephin), however, given his recent history, will treat as UTI for time being. Continue IV Rocephin   --Hyponatremia- holding HCTZ, also could be chronic from EtOH use (drinks \"one drink of vodka\" daily). Better today  --Urinary retention- s/p Shaffer placement, " consulted Urology, appreciate their input  --PAF- continue rate control, Xarelto on hold per Urology's recs last admission.   --HTN- continue home meds    DVT Prophylaxis:  mechanical    CODE STATUS:   Code Status and Medical Interventions:   Ordered at: 12/01/18 2021     Level Of Support Discussed With:    Patient     Code Status:    CPR     Medical Interventions (Level of Support Prior to Arrest):    Full       Disposition: I expect the patient to be discharged TBD      Electronically signed by Flavia aPvon MD, 12/03/18, 2:25 PM.

## 2018-12-03 NOTE — PLAN OF CARE
Problem: Patient Care Overview  Goal: Plan of Care Review  Outcome: Ongoing (interventions implemented as appropriate)   18   Coping/Psychosocial   Plan of Care Reviewed With patient   Plan of Care Review   Progress no change   OTHER   Outcome Summary Patient's VSS. Had echocardiogram today, awaiting MRI of brain. Patient restless, unable to sleep. Catheter still in place and patent. Will continue to monitor.       Problem: Fall Risk (Adult)  Intervention: Monitor/Assist with Self Care   18   Activity   Activity Assistance Provided assistance, 3 or more people   Daily Care Interventions   Self-Care Promotion BADL personal objects within reach     Intervention: Reduce Risk/Promote Restraint Free Environment   18   Safety Management   Environmental Safety Modification assistive device/personal items within reach;clutter free environment maintained;lighting adjusted;room organization consistent;room near unit station   Safety Promotion/Fall Prevention activity supervised;elopement precautions initiated;fall prevention program maintained;gait belt;nonskid shoes/slippers when out of bed;safety round/check completed   Prevent Kelford Drop/Fall   Safety/Security Measures bed alarm set;chair alarm set     Intervention: Review Medications/Identify Contributors to Fall Risk   18   Safety Management   Medication Review/Management medications reviewed;high risk medications identified       Goal: Identify Related Risk Factors and Signs and Symptoms  Outcome: Ongoing (interventions implemented as appropriate)   18   Fall Risk (Adult)   Related Risk Factors (Fall Risk) fatigue/slow reaction;fear of falling;gait/mobility problems;sensory deficits;neuro disease/injury;sleep pattern alteration;environment unfamiliar   Signs and Symptoms (Fall Risk) presence of risk factors     Goal: Absence of Fall  Outcome: Ongoing (interventions implemented as appropriate)   18    Fall Risk (Adult)   Absence of Fall making progress toward outcome       Problem: Skin Injury Risk (Adult)  Intervention: Maintain Head of Bed Elevation Less Than 30 Degrees as Tolerated   12/03/18 0154   Positioning   Head of Bed (HOB) HOB at 30-45 degrees     Intervention: Prevent/Minimize Shear/Friction Injuries   12/03/18 0154   Positioning   Positioning/Transfer Devices pillows;in use   Skin Interventions   Pressure Reduction Devices pressure-redistributing mattress utilized;positioning supports utilized       Goal: Identify Related Risk Factors and Signs and Symptoms  Outcome: Ongoing (interventions implemented as appropriate)   12/03/18 0154   Skin Injury Risk (Adult)   Related Risk Factors (Skin Injury Risk) advanced age;hospitalization prolonged;mobility impaired     Goal: Skin Health and Integrity  Outcome: Ongoing (interventions implemented as appropriate)   12/03/18 0154   Skin Injury Risk (Adult)   Skin Health and Integrity making progress toward outcome

## 2018-12-03 NOTE — PROGRESS NOTES
Daily Progress Note  Neurology     LOS: 1 day     Subjective     Chief Complaint: bilateral leg pain    Interval History:  A little confused this AM from not sleeping well last night    ROS: negative for fever    Objective     Vital signs in last 24 hours:  Temp:  [98.1 °F (36.7 °C)-98.8 °F (37.1 °C)] 98.1 °F (36.7 °C)  Heart Rate:  [] 81  Resp:  [18] 18  BP: (128-148)/(68-75) 139/75      Physical Exam:   General: Lying in bed with eyes open. In NAD.     Respiratory: Respirations unlabored   CV: RRR       Neurologic Exam:   Mental status: Awake, alert, Follows commands. Speech fluent   CN: II-XII intact                   Results from last 7 days   Lab Units  12/03/18   0521   WBC 10*3/mm3  8.45   HEMOGLOBIN g/dL  10.2*   HEMATOCRIT %  33.1*   PLATELETS 10*3/mm3  363           Results Review:    Labs reviewed  EMG/NCV report reviewed    Assessment/Plan       Generalized weakness    HTN (hypertension)    Paroxysmal atrial fibrillation (CMS/HCC)    Urinary retention    Prediabetes    Hyponatremia    Acute UTI (urinary tract infection)    Anemia    Pain in both lower extremities    Acute UTI        1.  Bilateral L5/S1 radiculopathy = subacute to chronic based on EMG/NCV report. On trial of gabapentin 100mg TID. Okay to titrate as tolerated/needed based on symptoms. Agree with rehab eval. Consider neurosurgical eval, but it may be less likely that he's an ideal surgical candidate.     No further recommendations. Will follow as needed    Fatemeh Ma MD  12/03/18  2:33 PM

## 2018-12-03 NOTE — PLAN OF CARE
Problem: Patient Care Overview  Goal: Plan of Care Review  Outcome: Ongoing (interventions implemented as appropriate)   12/03/18 1807   Coping/Psychosocial   Plan of Care Reviewed With patient;family   Plan of Care Review   Progress no change   OTHER   Outcome Summary adaquate intake po and iv, tolerating meals, 4 moderately lose stool today. vss, denies pain., unable to follow commands , generlized weakness.       Problem: Activity Intolerance (Adult)  Goal: Identify Related Risk Factors and Signs and Symptoms  Outcome: Ongoing (interventions implemented as appropriate)   12/03/18 1807   Activity Intolerance (Adult)   Related Risk Factors (Activity Intolerance) generalized weakness;functional decline;deconditioned state   Signs and Symptoms (Activity Intolerance) unable to complete BADL/IADL       Problem: Urine Elimination Impaired (Adult)  Goal: Identify Related Risk Factors and Signs and Symptoms  Outcome: Ongoing (interventions implemented as appropriate)   12/03/18 1807   Urine Elimination Impaired (Adult)   Related Risk Factors (Urine Elimination Impaired) functional impairment;infection/inflammation;immobility;procedure/surgery related   Signs and Symptoms (Urine Elimination Impaired) impaired renal function;residual greater than 100mL;infection/fever

## 2018-12-04 ENCOUNTER — APPOINTMENT (OUTPATIENT)
Dept: MRI IMAGING | Facility: HOSPITAL | Age: 83
End: 2018-12-04
Attending: INTERNAL MEDICINE

## 2018-12-04 LAB
ANION GAP SERPL CALCULATED.3IONS-SCNC: 8 MMOL/L (ref 3–11)
BUN BLD-MCNC: 8 MG/DL (ref 9–23)
BUN/CREAT SERPL: 14 (ref 7–25)
CALCIUM SPEC-SCNC: 8.2 MG/DL (ref 8.7–10.4)
CHLORIDE SERPL-SCNC: 105 MMOL/L (ref 99–109)
CO2 SERPL-SCNC: 20 MMOL/L (ref 20–31)
CREAT BLD-MCNC: 0.57 MG/DL (ref 0.6–1.3)
DEPRECATED RDW RBC AUTO: 45.2 FL (ref 37–54)
ERYTHROCYTE [DISTWIDTH] IN BLOOD BY AUTOMATED COUNT: 15.1 % (ref 11.3–14.5)
GFR SERPL CREATININE-BSD FRML MDRD: 135 ML/MIN/1.73
GLUCOSE BLD-MCNC: 111 MG/DL (ref 70–100)
HCT VFR BLD AUTO: 32.6 % (ref 38.9–50.9)
HGB BLD-MCNC: 10.4 G/DL (ref 13.1–17.5)
MCH RBC QN AUTO: 26 PG (ref 27–31)
MCHC RBC AUTO-ENTMCNC: 31.9 G/DL (ref 32–36)
MCV RBC AUTO: 81.5 FL (ref 80–99)
PLATELET # BLD AUTO: 317 10*3/MM3 (ref 150–450)
PMV BLD AUTO: 8.8 FL (ref 6–12)
POTASSIUM BLD-SCNC: 3.9 MMOL/L (ref 3.5–5.5)
RBC # BLD AUTO: 4 10*6/MM3 (ref 4.2–5.76)
SODIUM BLD-SCNC: 133 MMOL/L (ref 132–146)
WBC NRBC COR # BLD: 6.85 10*3/MM3 (ref 3.5–10.8)

## 2018-12-04 PROCEDURE — 99233 SBSQ HOSP IP/OBS HIGH 50: CPT | Performed by: INTERNAL MEDICINE

## 2018-12-04 PROCEDURE — 85027 COMPLETE CBC AUTOMATED: CPT | Performed by: INTERNAL MEDICINE

## 2018-12-04 PROCEDURE — 97163 PT EVAL HIGH COMPLEX 45 MIN: CPT

## 2018-12-04 PROCEDURE — 25010000002 DEXAMETHASONE PER 1 MG: Performed by: INTERNAL MEDICINE

## 2018-12-04 PROCEDURE — 80048 BASIC METABOLIC PNL TOTAL CA: CPT | Performed by: INTERNAL MEDICINE

## 2018-12-04 PROCEDURE — 72148 MRI LUMBAR SPINE W/O DYE: CPT

## 2018-12-04 PROCEDURE — 99221 1ST HOSP IP/OBS SF/LOW 40: CPT | Performed by: PHYSICIAN ASSISTANT

## 2018-12-04 PROCEDURE — 97166 OT EVAL MOD COMPLEX 45 MIN: CPT

## 2018-12-04 PROCEDURE — 25010000002 CEFTRIAXONE PER 250 MG: Performed by: NURSE PRACTITIONER

## 2018-12-04 RX ORDER — DEXAMETHASONE SODIUM PHOSPHATE 4 MG/ML
4 INJECTION, SOLUTION INTRA-ARTICULAR; INTRALESIONAL; INTRAMUSCULAR; INTRAVENOUS; SOFT TISSUE EVERY 8 HOURS
Status: DISCONTINUED | OUTPATIENT
Start: 2018-12-04 | End: 2018-12-06 | Stop reason: HOSPADM

## 2018-12-04 RX ADMIN — CEFTRIAXONE SODIUM 1 G: 1 INJECTION, SOLUTION INTRAVENOUS at 08:22

## 2018-12-04 RX ADMIN — DEXAMETHASONE SODIUM PHOSPHATE 4 MG: 4 INJECTION, SOLUTION INTRAMUSCULAR; INTRAVENOUS at 08:16

## 2018-12-04 RX ADMIN — Medication 250 MG: at 08:16

## 2018-12-04 RX ADMIN — HYDROCODONE BITARTRATE AND ACETAMINOPHEN 1 TABLET: 5; 325 TABLET ORAL at 07:50

## 2018-12-04 RX ADMIN — Medication 250 MG: at 21:24

## 2018-12-04 RX ADMIN — SODIUM CHLORIDE 75 ML/HR: 9 INJECTION, SOLUTION INTRAVENOUS at 05:11

## 2018-12-04 RX ADMIN — SODIUM CHLORIDE, PRESERVATIVE FREE 3 ML: 5 INJECTION INTRAVENOUS at 21:25

## 2018-12-04 RX ADMIN — FINASTERIDE 5 MG: 5 TABLET, FILM COATED ORAL at 08:16

## 2018-12-04 RX ADMIN — LISINOPRIL: 20 TABLET ORAL at 08:15

## 2018-12-04 RX ADMIN — GABAPENTIN 100 MG: 100 CAPSULE ORAL at 21:24

## 2018-12-04 RX ADMIN — GABAPENTIN 100 MG: 100 CAPSULE ORAL at 13:31

## 2018-12-04 RX ADMIN — CYANOCOBALAMIN TAB 1000 MCG 1000 MCG: 1000 TAB at 08:16

## 2018-12-04 RX ADMIN — GABAPENTIN 100 MG: 100 CAPSULE ORAL at 05:10

## 2018-12-04 RX ADMIN — DEXAMETHASONE SODIUM PHOSPHATE 4 MG: 4 INJECTION, SOLUTION INTRAMUSCULAR; INTRAVENOUS at 16:36

## 2018-12-04 RX ADMIN — Medication 100 MG: at 08:15

## 2018-12-04 NOTE — PLAN OF CARE
Problem: Patient Care Overview  Goal: Plan of Care Review  Outcome: Ongoing (interventions implemented as appropriate)   12/04/18 0417   Coping/Psychosocial   Plan of Care Reviewed With patient   OTHER   Outcome Summary pt rested well throughout the night and is pleasantly confused. fall precautions in place. plan to work with PT/OT today      Goal: Individualization and Mutuality  Outcome: Ongoing (interventions implemented as appropriate)      Problem: Fall Risk (Adult)  Goal: Absence of Fall  Outcome: Ongoing (interventions implemented as appropriate)      Problem: Skin Injury Risk (Adult)  Goal: Skin Health and Integrity  Outcome: Ongoing (interventions implemented as appropriate)

## 2018-12-04 NOTE — THERAPY EVALUATION
Acute Care - Occupational Therapy Initial Evaluation  [facility]     Patient Name: Sandip Feliz  : 1931  MRN: [id number]  Today's Date: 2018  Onset of Illness/Injury or Date of Surgery: 18  Date of Referral to OT: 18  Referring Physician: PITO Bowie    Admit Date: 2018       ICD-10-CM ICD-9-CM   1. Acute UTI N39.0 599.0   2. Bilateral lower extremity pain M79.604 729.5    M79.605    3. Unable to ambulate R26.2 719.7   4. History of atrial fibrillation Z86.79 V12.59   5. History of hypertension Z86.79 V12.59   6. At high risk for falls Z91.81 V15.88   7. Acute urinary retention R33.8 788.29     Patient Active Problem List   Diagnosis   • HTN (hypertension)   • Osteoporosis   • Arthritis of knee, left   • S/P left unicompartmental knee replacement   • Acute blood loss anemia, mild, asymptomatic   • Paroxysmal atrial fibrillation (CMS/HCC)   • Acute urinary retention   • Hematuria   • Urinary retention   • S/P greenlight laser vaporization of prostate   • Prediabetes   • Leukocytosis, mild, likely reactive   • Hyponatremia   • Acute UTI (urinary tract infection)   • Anemia   • Generalized weakness   • Pain in both lower extremities   • Acute UTI     Past Medical History:   Diagnosis Date   • A-fib (CMS/HCC)    • Arthritis    • Shaffer catheter in place    • GERD (gastroesophageal reflux disease)    • Hearing impairment    • Hearing loss     both ears hearing aides   • Heartburn    • Hypertension    • Melanoma (CMS/HCC)    • Wears dentures    • Wears dentures     partial bottom full at top   • Wears glasses    • Wears hearing aid      Past Surgical History:   Procedure Laterality Date   • APPENDECTOMY     • CATARACT EXTRACTION     • COLONOSCOPY      2 years ago   • SKIN CANCER EXCISION      MELANOMA   • STOMACH SURGERY            OT ASSESSMENT FLOWSHEET (last 72 hours)      Occupational Therapy Evaluation     Row Name 18 1129                   OT Evaluation Time/Intention     Subjective Information  complains of;pain;weakness  -        Document Type  evaluation  -        Mode of Treatment  occupational therapy  -AC        Patient Effort  good  -           General Information    Patient Profile Reviewed?  yes  -        Onset of Illness/Injury or Date of Surgery  12/01/18  -        Referring Physician  PITO Bowie  -        Patient Observations  alert;cooperative;agree to therapy  -        Patient/Family Observations  wife present  -        General Observations of Patient  Pt received sitting up in bed  -        Prior Level of Function  independent:;all household mobility;feeding;grooming;min assist:;dressing;bathing  -        Equipment Currently Used at Home  bath bench;commode, bedside;walker, rolling  -        Pertinent History of Current Functional Problem  Pt admit with generalized weakness, LE pain and urinary retention  -        Existing Precautions/Restrictions  fall  -AC        Risks Reviewed  patient:;spouse/S.O.:;LOB;increased discomfort  -        Benefits Reviewed  patient:;spouse/S.O.:;improve function;increase independence;increase strength;increase balance;increase knowledge  -        Barriers to Rehab  previous functional deficit  -AC           Relationship/Environment    Primary Source of Support/Comfort  spouse  -AC        Lives With  spouse  -AC        Concerns About Impact on Relationships  niece assists ig going for MD visit  -           Resource/Environmental Concerns    Current Living Arrangements  home/apartment/condo  -           Home Main Entrance    Number of Stairs, Main Entrance  two  -AC        Stair Railings, Main Entrance  none  -AC           Cognitive Assessment/Intervention- PT/OT    Orientation Status (Cognition)  oriented x 4  -AC        Follows Commands (Cognition)  follows one step commands;over 90% accuracy  -        Safety Deficit (Cognitive)  at risk behavior observed  -AC           Safety Issues, Functional  Mobility    Safety Issues Affecting Function (Mobility)  at risk behavior observed  -        Impairments Affecting Function (Mobility)  balance;strength;postural/trunk control  -AC           Bed Mobility Assessment/Treatment    Bed Mobility Assessment/Treatment  supine-sit  -        Supine-Sit [place] (Bed Mobility)  contact guard  -        Bed Mobility, Safety Issues  decreased use of legs for bridging/pushing  -           Functional Mobility    Functional Mobility- Ind. Level  verbal cues required;moderate assist (50% patient effort);2 person assist required  -        Functional Mobility- Device  rolling walker  -        Functional Mobility-Distance (Feet)  10  -AC        Functional Mobility- Safety Issues  step length decreased;weight-shifting ability decreased does not step close enough to walker  -           Transfer Assessment/Treatment    Transfer Assessment/Treatment  sit-stand transfer;stand-sit transfer  -           Sit-Stand Transfer    Sit-Stand [place] (Transfers)  verbal cues;moderate assist (50% patient effort);2 person assist  -        Assistive Device (Sit-Stand Transfers)  walker, front-wheeled  -           Stand-Sit Transfer    Stand-Sit [place] (Transfers)  verbal cues;moderate assist (50% patient effort);2 person assist  -        Assistive Device (Stand-Sit Transfers)  walker, front-wheeled  -           ADL Assessment/Intervention    BADL Assessment/Intervention  lower body dressing  -           Lower Body Dressing Assessment/Training    Lower Body Dressing [place] Level  doff;supervision;don;moderate assist (50% patient effort)  -        Lower Body Dressing Position  long sitting  -           BADL Safety/Performance    Impairments, BADL Safety/Performance  balance;pain;strength;trunk/postural control  -           General ROM    GENERAL ROM COMMENTS  BUE WFL  -AC           MMT (Manual Muscle Testing)    General MMT Comments  BUE grossly 4/5   -AC           Motor Assessment/Interventions    Additional Documentation  Balance (Group);Balance Interventions (Group)  -AC           Balance    Balance  static sitting balance;dynamic standing balance;static standing balance  -AC           Static Sitting Balance    Level of [place] (Unsupported Sitting, Static Balance)  independent  -AC           Static Standing Balance    Level of [place] (Supported Standing, Static Balance)  moderate assist, 50 to 74% patient effort RW  -AC           Dynamic Standing Balance    Level of [place], Reaches Outside Midline (Standing, Dynamic Balance)  moderate assist, 50 to 74% patient effort x 2 with RW  -AC           Sensory Assessment/Intervention    Sensory General Assessment  no sensation deficits identified BUE  -AC        Additional Documentation  Vision Assessment/Intervention (Group)  -AC           Vision Assessment/Intervention    Visual Impairment/Limitations  corrective lenses full time  -AC           Positioning and Restraints    Pre-Treatment Position  in bed  -AC        Post Treatment Position  chair  -AC        In Chair  reclined;call light within reach;encouraged to call for assist;exit alarm on;with family/caregiver;RUE elevated;LUE elevated  -AC           Pain Assessment    Additional Documentation  Pain Scale: Numbers Pre/Post-Treatment (Group)  -AC           Pain Scale: Numbers Pre/Post-Treatment    Pain Scale: Numbers, Pretreatment  8/10  -AC        Pain Scale: Numbers, Post-Treatment  8/10  -AC        Pain Location - Side  Bilateral  -AC        Pain Location - Orientation  lower  -AC        Pain Location  extremity  -AC        Pain Intervention(s)  Repositioned  -AC           Clinical Impression (OT)    Date of Referral to OT  12/02/18  -        OT Diagnosis  ADL decline  -AC        Patient/Family Goals Statement (OT Eval)  Increase ADL independence  -AC        Criteria for Skilled Therapeutic Interventions Met (OT Eval)  yes;treatment  indicated  -AC        Rehab Potential (OT Eval)  good, to achieve stated therapy goals  -AC        Therapy Frequency (OT Eval)  daily  -AC        Care Plan Review (OT)  evaluation/treatment results reviewed  -AC        Care Plan Review, Other Participant (OT Eval)  spouse  -AC        Anticipated Discharge Disposition (OT)  skilled nursing facility  -AC           Planned OT Interventions    Planned Therapy Interventions (OT Eval)  BADL retraining;functional balance retraining;occupation/activity based interventions;strengthening exercise;transfer/mobility retraining  -AC           OT Goals    Bed Mobility Goal Selection (OT)  bed mobility, OT goal 1  -AC        Transfer Goal Selection (OT)  transfer, OT goal 1  -AC        Dressing Goal Selection (OT)  dressing, OT goal 1  -AC        Toileting Goal Selection (OT)  toileting, OT goal 1  -AC           Bed Mobility Goal 1 (OT)    Activity/Assistive Device (Bed Mobility Goal 1, OT)  bed mobility activities, all  -AC        [place] Level/Cues Needed (Bed Mobility Goal 1, OT)  supervision required  -AC        Time Frame (Bed Mobility Goal 1, OT)  by discharge  -AC        Progress/Outcomes (Bed Mobility Goal 1, OT)  goal ongoing  -AC           Transfer Goal 1 (OT)    Activity/Assistive Device (Transfer Goal 1, OT)  bed-to-chair/chair-to-bed;toilet;walker, rolling  -AC        [place] Level/Cues Needed (Transfer Goal 1, OT)  minimum assist (75% or more patient effort)  -AC        Time Frame (Transfer Goal 1, OT)  by discharge  -AC        Progress/Outcome (Transfer Goal 1, OT)  goal ongoing  -AC           Dressing Goal 1 (OT)    Activity/Assistive Device (Dressing Goal 1, OT)  lower body dressing  -AC        [place]/Cues Needed (Dressing Goal 1, OT)  minimum assist (75% or more patient effort)  -AC        Time Frame (Dressing Goal 1, OT)  by discharge  -AC        Progress/Outcome (Dressing Goal 1, OT)  goal ongoing  -AC           Toileting Goal 1 (OT)     Activity/Device (Toileting Goal 1, OT)  adjust/manage clothing;perform perineal hygiene  -        [place] Level/Cues Needed (Toileting Goal 1, OT)  minimum assist (75% or more patient effort)  -        Time Frame (Toileting Goal 1, OT)  by discharge  -        Progress/Outcome (Toileting Goal 1, OT)  goal ongoing  -           Living Environment    Home Accessibility  stairs to enter home;tub/shower is not walk in  -          User Key  (r) = Recorded By, (t) = Taken By, (c) = Cosigned By    Initials Name Effective Dates     Snow Feliz, OT 06/23/15 -          Occupational Therapy Education     Title: PT OT SLP Therapies (In Progress)     Topic: Occupational Therapy (In Progress)     Point: ADL training (Done)     Description: Instruct learner(s) on proper safety adaptation and remediation techniques during self care or transfers.   Instruct in proper use of assistive devices.    Learning Progress Summary           Patient Acceptance, E,TB, VU by  at 12/4/2018  1:38 PM    Comment:  benefits of activity, role of OT, d/c plan   Significant Other Acceptance, E,TB, VU by  at 12/4/2018  1:38 PM    Comment:  benefits of activity, role of OT, d/c plan                               User Key     Initials Effective Dates Name Provider Type Discipline     06/23/15 -  Snow Feliz, OT Occupational Therapist OT                  OT Recommendation and Plan  Outcome Summary/Treatment Plan (OT)  Anticipated Discharge Disposition (OT): skilled nursing facility  Planned Therapy Interventions (OT Eval): BADL retraining, functional balance retraining, occupation/activity based interventions, strengthening exercise, transfer/mobility retraining  Therapy Frequency (OT Eval): daily  Plan of Care Review  Plan of Care Reviewed With: spouse, patient  Plan of Care Reviewed With: spouse, patient  Outcome Summary: OT eval complete.  Pt CGA supine to sit, independent to doff socks, mod a to don shoes,  Pt presents with  pain, weakness, decreased balance and mild confusion limiting independence with self care.  OT will follow to advance pt toward increased independence with ADLs.  Recommend SNF for rehab.     Outcome Measures     Row Name 12/04/18 1129             How much help from another is currently needed...    Putting on and taking off regular lower body clothing?  2  -AC      Bathing (including washing, rinsing, and drying)  2  -AC      Toileting (which includes using toilet bed pan or urinal)  2  -AC      Putting on and taking off regular upper body clothing  3  -AC      Taking care of personal grooming (such as brushing teeth)  3  -AC      Eating meals  3  -AC      Score  15  -AC         Functional Assessment    Outcome Measure Options  AM-PAC 6 Clicks Daily Activity (OT)  -        User Key  (r) = Recorded By, (t) = Taken By, (c) = Cosigned By    Initials Name Provider Type    Snow Ventura OT Occupational Therapist          Time Calculation:   Time Calculation- OT     Row Name 12/04/18 1129             Time Calculation- OT    OT Start Time  1129  -      Total Timed Code Minutes- OT  0 minute(s)  -      OT Received On  12/04/18  -      OT Goal Re-Cert Due Date  12/14/18  -        User Key  (r) = Recorded By, (t) = Taken By, (c) = Cosigned By    Initials Name Provider Type    Snow Ventura OT Occupational Therapist        Therapy Suggested Charges     Code   Minutes Charges    None           Therapy Charges for Today     Code Description Service Date Service Provider Modifiers Qty    [id number]  OT EVAL MOD COMPLEXITY 4 12/4/2018 Snow Feliz OT GO 1               Snow Feliz OT  12/4/2018

## 2018-12-04 NOTE — PLAN OF CARE
Problem: Patient Care Overview  Goal: Plan of Care Review  Outcome: Ongoing (interventions implemented as appropriate)   12/04/18 9905   Coping/Psychosocial   Plan of Care Reviewed With patient;spouse   Plan of Care Review   Progress improving   OTHER   Outcome Summary VSS, minimal complaints of pain related to lying down. Pt. seems comfortable sitting up. MRI done with assist of pain medication. Neurosurgery consulted. Continue to monitor.

## 2018-12-04 NOTE — PLAN OF CARE
Problem: Patient Care Overview  Goal: Plan of Care Review  Outcome: Ongoing (interventions implemented as appropriate)   12/04/18 1130   Coping/Psychosocial   Plan of Care Reviewed With spouse;patient   OTHER   Outcome Summary PT eval completed patient is able to transfer and ambulate with walker with mod assist x2 anticipate patient will need SNF for continued PT/OT upon D/C from [facility] prior to going home with wife

## 2018-12-04 NOTE — DISCHARGE PLACEMENT REQUEST
"Sandip Yuan (87 y.o. Male)   Narda Aleman, RN  [phone]    Date of Birth Social Security Number Address Home Phone MRN    08/24/1931  [address] [address] [phone] [id number]    Nondenominational Marital Status          Adventism        Admission Date Admission Type Admitting Provider Attending Provider Department, Room/Bed    12/1/18 Emergency Flavia Pavon MD Howard, Gabriela Kirk, MD [facility] 2F, S204/1    Discharge Date Discharge Disposition Discharge Destination                       Attending Provider:  Flavia Pavon MD    Allergies:  No Known Allergies    Isolation:  None   Infection:  None   Code Status:  CPR    Ht:  165.1 cm (65\")   Wt:  63.5 kg (140 lb)    Admission Cmt:  None   Principal Problem:  Generalized weakness [R53.1]                 Active Insurance as of 12/1/2018     Primary Coverage     Payor Plan Insurance Group Employer/Plan Group    MEDICARE MEDICARE A & B      Payor Plan Address Payor Plan Phone Number Payor Plan Fax Number Effective Dates    [address] [phone]  8/1/1996 - None Entered    [facility] [id number]       Subscriber Name Subscriber Birth Date Member ID       SANDIP YUAN 8/24/1931 [id number]           Secondary Coverage     Payor Plan Insurance Group Employer/Plan Group    AARP MED SUPP AARP HEALTH CARE OPTIONS      Payor Plan Address Payor Plan Phone Number Payor Plan Fax Number Effective Dates    [facility] [phone]  1/1/2018 - None Entered    [address]       [facility] [address]       Subscriber Name Subscriber Birth Date Member ID       SANDIP YUAN 8/24/1931 [id number]                 Emergency Contacts      (Rel.) Home Phone Work Phone Mobile Phone    Angélica Yuan (Power of ) [phone] -- [phone]               History & Physical      Dulce Valencia MD at 12/1/2018  7:21 PM              [facility] Medicine " Services  HISTORY AND PHYSICAL    Patient Name: Sandip Feliz  : 1931  MRN: [id number]  Primary Care Physician: Kelly Robison MD  Date of admission: 2018      Subjective   Subjective     Chief Complaint:  Leg pain    HPI:  Sandip Feliz is a 87 y.o. male who presents to the ED with c/o leg pain with onset 4 months ago. He reports that he has pain from his knees down that waxes and wanes with tingling in his toes. His pain is described as aching. The pt denies any redness or warmth across his BLE.  At baseline the patient uses a walker, and today was unable to ambulate secondary to pain. He reports having low back pain which is chronic and unchanged from baseline. The pt went to his PCP for this complaint who prescribed him tramadol. He notes that this medication gives him partial relief, however, his pain has persisted which prompted his visit to the ED.  Patient also reports having urinary retention that started last night.  He notes that he had an enlarged bladder and had a cystoscopy with clot evacuation performed by Dr. Rausch, Urologist at the end of October. He had a catheter put in at this time and was removed 3 weeks ago. He also notes that he was taken off his Xarelto before his procedure took place.  He also complains of generalized weakness but denies fever, chills, chest pain, edema, or any complaints at this time. He denies any recent falls.  CT of the lumbar spine was attempted twice tonight but patient was unable to tolerated secondary to pain in his back and legs when being in the supine position.  Patient was started on Rocephin in the  ED for a UTI, and is being admitted to the Hospitalist for further evaluation and management.          Review of Systems   Constitutional: Positive for activity change and appetite change. Negative for chills, diaphoresis, fatigue, fever and unexpected weight change.   HENT: Negative.    Eyes: Negative.    Respiratory: Positive for  shortness of breath (chronic). Negative for cough, choking, chest tightness, wheezing and stridor.    Cardiovascular: Negative.    Gastrointestinal: Negative.    Endocrine: Negative.    Genitourinary: Positive for decreased urine volume and difficulty urinating (started yesterday). Negative for dysuria, frequency, hematuria and urgency.   Musculoskeletal: Positive for back pain (low back, chronic and unchanged).        Bilateral foot and leg pain from the knees down   Skin: Negative.    Allergic/Immunologic: Negative.    Neurological: Positive for weakness (generalized). Negative for dizziness, tremors, seizures, syncope, facial asymmetry, speech difficulty, light-headedness, numbness and headaches.        Tingling in toes   Hematological: Negative.    Psychiatric/Behavioral: Negative.         Otherwise 10-system ROS reviewed and is negative except as mentioned in the HPI.    Personal History     Past Medical History:   Diagnosis Date   • A-fib (CMS/HCC)    • Arthritis    • Shaffer catheter in place    • GERD (gastroesophageal reflux disease)    • Hearing impairment    • Hearing loss     both ears hearing aides   • Heartburn    • Hypertension    • Melanoma (CMS/HCC)    • Wears dentures    • Wears dentures     partial bottom full at top   • Wears glasses    • Wears hearing aid        Past Surgical History:   Procedure Laterality Date   • APPENDECTOMY     • CATARACT EXTRACTION     • COLONOSCOPY      2 years ago   • SKIN CANCER EXCISION      MELANOMA   • STOMACH SURGERY         Family History: family history includes Cancer in his mother; Prostate cancer in his brother, brother, and brother; Stroke in his father. Otherwise pertinent FHx was reviewed and unremarkable.     Social History:  reports that he quit smoking about 48 years ago. His smoking use included pipe. he has never used smokeless tobacco. He reports that he drinks about 1.2 - 2.4 oz of alcohol per week. He reports that he does not use drugs.  Social History      Social History Narrative   • Not on file       Medications:  Available home medication information reviewed.    No Known Allergies    Objective   Objective     Vital Signs:   Temp:  [97.7 °F (36.5 °C)-98.3 °F (36.8 °C)] 97.7 °F (36.5 °C)  Heart Rate:  [] 95  Resp:  [16-18] 18  BP: (117-147)/(74-93) 147/78        Physical Exam   Constitutional: He is oriented to person, place, and time. He appears well-developed. No distress.   HENT:   Head: Normocephalic.   Eyes: Pupils are equal, round, and reactive to light.   Neck: Normal range of motion. Neck supple.   Cardiovascular: Normal rate, regular rhythm, normal heart sounds and intact distal pulses. Exam reveals no gallop and no friction rub.   No murmur heard.  Pulmonary/Chest: Effort normal and breath sounds normal. No stridor. No respiratory distress. He has no wheezes. He has no rales.   Abdominal: Soft. Bowel sounds are normal. He exhibits no distension and no mass. There is no tenderness. There is no rebound and no guarding.   Musculoskeletal: Normal range of motion. He exhibits no edema, tenderness or deformity.   Neurological: He is alert and oriented to person, place, and time. No cranial nerve deficit.   Speech is clear, strength equal bilaterally, no sensory deficit   Skin: Skin is warm and dry. No rash noted. He is not diaphoretic. No erythema. No pallor.   Psychiatric: He has a normal mood and affect. His behavior is normal. Judgment and thought content normal.          Results Reviewed:  I have personally reviewed current lab, radiology, and data and agree.    Results from last 7 days   Lab Units  12/01/18   1405   WBC 10*3/mm3  7.06   HEMOGLOBIN g/dL  10.5*   HEMATOCRIT %  33.8*   PLATELETS 10*3/mm3  386     Results from last 7 days   Lab Units  12/01/18   1405   SODIUM mmol/L  126*   POTASSIUM mmol/L  3.8   CHLORIDE mmol/L  97*   CO2 mmol/L  22.0   BUN mg/dL  17   CREATININE mg/dL  0.89   GLUCOSE mg/dL  116*   CALCIUM mg/dL  8.9   ALT (SGPT)  U/L  17   AST (SGOT) U/L  23     Estimated Creatinine Clearance: 52.5 mL/min (by C-G formula based on SCr of 0.89 mg/dL).  Brief Urine Lab Results  (Last result in the past 365 days)      Color   Clarity   Blood   Leuk Est   Nitrite   Protein   CREAT   Urine HCG        12/01/18 1414 Straw Cloudy 3+ Moderate (2+) Positive Trace             BNP   Date Value Ref Range Status   12/01/2018 39.0 0.0 - 100.0 pg/mL Final     Comment:     Results may be falsely decreased if patient taking Biotin.     Imaging Results (last 24 hours)     Procedure Component Value Units Date/Time    XR Chest 1 View [[id number]] Collected:  12/01/18 1631     Updated:  12/01/18 1631    Narrative:          EXAMINATION: XR CHEST 1 VW - 12/01/2018     INDICATION: Shortness of air.     COMPARISON: 10/22/2018     FINDINGS: Portable chest reveals the heart to be enlarged. Underlying  chronic changes seen in lung fields bilaterally with slight prominence  of the pulmonary vascularity. Degenerative changes seen within the  spine. No pleural effusion or pneumothorax. Chronic changes seen within  the lung bases. Slight prominence of the pulmonary vascularity.           Impression:       Enlargement of the heart with slight prominence of the  pulmonary vascularity. No evidence of acute parenchymal disease.     DICTATED:   12/01/2018  EDITED/ls :   12/01/2018               Results for orders placed in visit on 05/24/16   SCANNED - ECHOCARDIOGRAM       Assessment/Plan   Assessment / Plan     Active Hospital Problems    Diagnosis   • **Generalized weakness   • Acute UTI (urinary tract infection)   • Anemia   • Pain in both lower extremities   • Hyponatremia   • Urinary retention   • Prediabetes   • Paroxysmal atrial fibrillation (CMS/HCC)   • HTN (hypertension)         Assessment & Plan:    1.  Bilateral lower extremity pain and difficulty with ambulation   -fall precautions   -re-attempt CT of the lumbar spine in the morning   -pt/ot consult in the am   -pain  control    2.  Acute UTI (poa)   -rocephin   -urine culture   -bmp, cbc in the am     3.  Urinary retention   -christianson placed   -consult urology in the am    4.  Hyponatremia   -urine sodium and osmolality   -serum osmolality   -NS @ 75 ml/hr   -bmp in the am    5.  Generalized weakness   -pt/ot consult in the am   -fall precautions   -case management consult for discharge planning    6.  HTN   -continue home meds    7.  Anemia   -improved since previous admission   -cbc in the am and monitor    8.  PAF   -currently off Xarelto per Dr. Rausch secondary to recent surgery    9.  History of prediabetes   -a1c in the am    DVT prophylaxis:  Mechanical    CODE STATUS:    Code Status and Medical Interventions:   Ordered at: 12/01/18 2021     Level Of Support Discussed With:    Patient     Code Status:    CPR     Medical Interventions (Level of Support Prior to Arrest):    Full           Electronically signed by PITO Marr, 12/01/18, 7:21 PM.    PHYSICIAN NOTE(ADDENDUM)            Vitals:     12/01/18 1856   BP: 147/78   Pulse: 95   Resp: 18   Temp: 97.7 °F (36.5 °C)   SpO2: 96%         HPI  87-year-old male presented to ED with the chief complaint of lower extremity pain/weakness-going on for past 4 months-worse over past week..  Does co of some leg/knee pain, no overt back pain, difficulty standing, and bearing wt, no fall, no major sensory sx.        .  Also has urinary retention-past 24 hrs.no bowel incontinece.  Patient's catheter was removed approximately 3 weeks back.  Patient did had urinary retention-800 mL on bladder scan.  Patient got admitted at the end of October secondary to urinary retention status post laser surgery of prostate by Dr. samuel hardin, and reduction of paraphimosis.        Patient got morphine 2 mg IV, Zofran 4 mg, Rocephin 1 g,     Exam-very hard of hearring  RS- CTA-BL, ,  No wheezing , no crackles, good effort.  CVS- s1s2 Regular, no murmur.  ABD- soft, non tender, not distended,  no organomegaly.  EXT- no edema,good dorsi and plantar flexion, power is 4/5 in all 4 ext, but has significant tremors worse in arms,then leg.  No speech deficit or facial droop.  No back tenderness, Babinski down going.  NEURO- AAO-3, no focal deficit.        Old records reviewed and summarized in PM hx.        PM hx  Hypertension-Lotrel 5/20 by mouth daily.  Triamterene/HCTZ 37.5 by mouth daily  BPH-sinus steroid 5 mg by mouth daily, Flomax 0.4 mg by mouth daily at bedtime  Chronic pain-hydrocodone 5 mg by mouth every 6 when necessary.  GERD-Zantac 300 milligrams by mouth every RN  A. fib-Xarelto 10 mg-still on hold.  Osteoarthritis-left knee arthroplasty..  Drinks alcohol-daily -1 drink of vodka.     LABS  BNP of 39, glucose of 116, sodium of 126, potassium of 3.8, BUN/creatinine 17 and 0.8, bicarbonate of 22  Serum osmolality of 282, urine osmolality of 422 urine sodium of 94  WBC of 7, neutrophils of 73, hemoglobin of 10, hematocrit of 33, platelet count of 386,  UA-large blood, nitrite positive, RBC 21-30, and WBC too numerous to count, bacteria 1+,  Chest x-ray-enlargement of heart with slight prominence of PVC.  Almost similar to the old film.  EKG-sinus rhythm of 92 bpm, no acute ST-T wave changes, QTC of 440.     A/P  Lower ext weakness generalized as's with tremors-  DD-radiculopathy, ?tremors,   -will benefit from MRI-pt did not tolerate ct scan tonight-tried for lower back.  -neuro consult, b12, folate, TSH, s cortisol  -wup for hyponatremia-appears euvolemic-hold hctz. ?siadh  -hx of P.afib-xarelto is on hold-pt has slight hematuria tonight-need to address longterm anticoagulation-chadvasc=3.  -urology consult in am.        I have independently seen and examined the patient with ARNP and the note above reflects my changes and contributions. I have discussed with findings, diagnosis and plans with the patient and family.      Dulce Valencia MD 12/01/18 9:48 PM                   Electronically signed  "by Dulce Valencia MD at 12/1/2018 11:21 PM       Hospital Medications (active)       Dose Frequency Start End    acetaminophen (TYLENOL) tablet 1,000 mg 1,000 mg Every 6 Hours PRN 12/1/2018     Sig - Route: Take 2 tablets by mouth Every 6 (Six) Hours As Needed for Mild Pain . - Oral    amLODIPine (NORVASC) 5 mg, lisinopril (PRINIVIL,ZESTRIL) 20 mg  Every 24 Hours Scheduled 12/2/2018     Sig - Route: Take  by mouth Daily. - Oral    cefTRIAXone (ROCEPHIN) IVPB 1 g 1 g Every 24 Hours 12/2/2018 12/9/2018    Sig - Route: Infuse 50 mL into a venous catheter Daily. - Intravenous    dexamethasone (DECADRON) injection 4 mg 4 mg Every 8 Hours 12/4/2018     Sig - Route: Infuse 1 mL into a venous catheter Every 8 (Eight) Hours. - Intravenous    docusate sodium (COLACE) capsule 100 mg 100 mg 2 Times Daily 12/1/2018     Sig - Route: Take 1 capsule by mouth 2 (Two) Times a Day. - Oral    finasteride (PROSCAR) tablet 5 mg 5 mg Daily 12/2/2018     Sig - Route: Take 1 tablet by mouth Daily. - Oral    gabapentin (NEURONTIN) capsule 100 mg 100 mg Every 8 Hours Scheduled 12/2/2018     Sig - Route: Take 1 capsule by mouth Every 8 (Eight) Hours. - Oral    HYDROcodone-acetaminophen (NORCO) 5-325 MG per tablet 1 tablet 1 tablet Every 6 Hours PRN 12/1/2018     Sig - Route: Take 1 tablet by mouth Every 6 (Six) Hours As Needed for Moderate Pain . - Oral    potassium chloride (KLOR-CON) packet 40 mEq 40 mEq As Needed 12/3/2018     Sig - Route: Take 40 mEq by mouth As Needed (potassium replacement, see admin instructions). - Oral    Linked Group 1:  \"Or\" Linked Group Details        potassium chloride (MICRO-K) CR capsule 40 mEq 40 mEq As Needed 12/3/2018     Sig - Route: Take 4 capsules by mouth As Needed (potassium replacement.  see admin instructions). - Oral    Linked Group 1:  \"Or\" Linked Group Details        potassium chloride 10 mEq in 100 mL IVPB 10 mEq Every 1 Hour PRN 12/3/2018     Sig - Route: Infuse 100 mL into a venous catheter " "Every 1 (One) Hour As Needed (potassium protocol PERIPHERAL - see admin instructions). - Intravenous    Linked Group 1:  \"Or\" Linked Group Details        saccharomyces boulardii (FLORASTOR) capsule 250 mg 250 mg 2 Times Daily 12/1/2018     Sig - Route: Take 1 capsule by mouth 2 (Two) Times a Day. - Oral    sodium chloride 0.9 % flush 10 mL 10 mL As Needed 12/1/2018     Sig - Route: Infuse 10 mL into a venous catheter As Needed for Line Care. - Intravenous    Linked Group 2:  \"And\" Linked Group Details        sodium chloride 0.9 % flush 3 mL 3 mL Every 12 Hours Scheduled 12/1/2018     Sig - Route: Infuse 3 mL into a venous catheter Every 12 (Twelve) Hours. - Intravenous    sodium chloride 0.9 % flush 3-10 mL 3-10 mL As Needed 12/1/2018     Sig - Route: Infuse 3-10 mL into a venous catheter As Needed for Line Care. - Intravenous    thiamine (VITAMIN B-1) tablet 100 mg 100 mg Daily 12/3/2018     Sig - Route: Take 1 tablet by mouth Daily. - Oral    vitamin B-12 (CYANOCOBALAMIN) tablet 1,000 mcg 1,000 mcg Daily 12/2/2018     Sig - Route: Take 1 tablet by mouth Daily. - Oral    sodium chloride 0.9 % infusion (Discontinued) 75 mL/hr Continuous 12/1/2018 12/4/2018    Sig - Route: Infuse 75 mL/hr into a venous catheter Continuous. - Intravenous            Lab Results (last 24 hours)     Procedure Component Value Units Date/Time    Basic Metabolic Panel [[id number]]  (Abnormal) Collected:  12/04/18 0648    Specimen:  Blood Updated:  12/04/18 0741     Glucose 111 mg/dL      BUN 8 mg/dL      Creatinine 0.57 mg/dL      Sodium 133 mmol/L      Potassium 3.9 mmol/L      Chloride 105 mmol/L      CO2 20.0 mmol/L      Calcium 8.2 mg/dL      eGFR Non African Amer 135 mL/min/1.73      BUN/Creatinine Ratio 14.0     Anion Gap 8.0 mmol/L     Narrative:       National Kidney Foundation Guidelines    Stage     Description        GFR  1         Normal or High     90+  2         Mild decrease      60-89  3         Moderate decrease  30-59  4    "      Severe decrease    15-29  5         Kidney failure     <15    The MDRD GFR formula is only valid for adults with stable renal function between ages 18 and 70.    CBC (No Diff) [[id number]]  (Abnormal) Collected:  18    Specimen:  Blood Updated:  18     WBC 6.85 10*3/mm3      RBC 4.00 10*6/mm3      Hemoglobin 10.4 g/dL      Hematocrit 32.6 %      MCV 81.5 fL      MCH 26.0 pg      MCHC 31.9 g/dL      RDW 15.1 %      RDW-SD 45.2 fl      MPV 8.8 fL      Platelets 317 10*3/mm3            Physician Progress Notes (last 24 hours) (Notes from 12/3/2018  2:35 PM through 2018  2:35 PM)      Flavia Pavon MD at 2018  9:16 AM              [facility] Medicine Services  PROGRESS NOTE    Patient Name: Sandip Feliz  : 1931  MRN: [id number]    Date of Admission: 2018  Length of Stay: 2  Primary Care Physician: Kelly Robison MD    Subjective   Subjective     CC:  Weakness, leg pain    HPI:  No new issues overnight, per wife, pt slept well last pm.  Pt's hearing aids are currently out of his ears, so communication limited.    Review of Systems  Gen- No fevers, chills  CV- No chest pain, palpitations  Resp- No cough, dyspnea  GI- No N/V/D, abd pain  Otherwise ROS is negative except as mentioned in the HPI.    Objective   Objective     Vital Signs:   Temp:  [97.8 °F (36.6 °C)-100.2 °F (37.9 °C)] 98.5 °F (36.9 °C)  Heart Rate:  [84-87] 86  Resp:  [18] 18  BP: (127-164)/(67-87) 127/67        Physical Exam:  Constitutional: No acute distress, awake, alert, [place]  HENT: NCAT, mucous membranes moist  Respiratory: Clear to auscultation bilaterally, respiratory effort normal   Cardiovascular: RRR, no murmurs, rubs, or gallops, palpable pedal pulses bilaterally  Gastrointestinal: Positive bowel sounds, soft, nontender, nondistended  Musculoskeletal: No bilateral ankle edema  Psychiatric: Appropriate affect, cooperative  Neurologic: Oriented x 3, Cranial  Nerves grossly intact to confrontation, speech clear  Skin: No rashes    Results Reviewed:  I have personally reviewed current lab, radiology, and data and agree.    Results from last 7 days   Lab Units  12/04/18   0648  12/03/18   0521  12/02/18   0721   WBC 10*3/mm3  6.85  8.45  8.19   HEMOGLOBIN g/dL  10.4*  10.2*  10.3*   HEMATOCRIT %  32.6*  33.1*  33.6*   PLATELETS 10*3/mm3  317  363  377     Results from last 7 days   Lab Units  12/04/18   0648  12/03/18   0522  12/02/18   0721  12/01/18   1405   SODIUM mmol/L  133  133  133  126*   POTASSIUM mmol/L  3.9  3.5  4.0  3.8   CHLORIDE mmol/L  105  102  101  97*   CO2 mmol/L  20.0  21.0  23.0  22.0   BUN mg/dL  8*  9  14  17   CREATININE mg/dL  0.57*  0.65  0.75  0.89   GLUCOSE mg/dL  111*  115*  112*  116*   CALCIUM mg/dL  8.2*  8.3*  8.6*  8.9   ALT (SGPT) U/L   --    --    --   17   AST (SGOT) U/L   --    --    --   23     Estimated Creatinine Clearance: 58.4 mL/min (A) (by C-G formula based on SCr of 0.57 mg/dL (L)).  BNP   Date Value Ref Range Status   12/01/2018 39.0 0.0 - 100.0 pg/mL Final     Comment:     Results may be falsely decreased if patient taking Biotin.       Microbiology Results Abnormal     Procedure Component Value - Date/Time    Urine Culture - Urine, Urine, Clean Catch [[id number]]  (Abnormal)  (Susceptibility) Collected:  12/01/18 1402    Lab Status:  Final result Specimen:  Urine, Clean Catch Updated:  12/03/18 1141     Urine Culture 20,000-30,000 CFU/mL Escherichia coli      20,000-30,000 CFU/mL Normal Urogenital [place]    Susceptibility      Escherichia coli     QUIN     Ampicillin Resistant     Ampicillin + Sulbactam Resistant     Aztreonam Susceptible     Cefepime Susceptible     Cefotaxime Susceptible     Ceftriaxone Susceptible     Cefuroxime sodium Susceptible     Cephalothin Intermediate     Ciprofloxacin Resistant     Ertapenem Susceptible     Gentamicin Susceptible     Levofloxacin Resistant     Meropenem Susceptible      Nitrofurantoin Susceptible     Piperacillin + Tazobactam Susceptible     Tetracycline Susceptible     Tobramycin Susceptible     Trimethoprim + Sulfamethoxazole Susceptible                          Imaging Results (last 24 hours)     Procedure Component Value Units Date/Time    MRI Lumbar Spine Without Contrast [[id number]] Collected:  12/04/18 1035     Updated:  12/04/18 1035    Narrative:       EXAMINATION: MRI LUMBAR SPINE WO CONTRAST- 12/04/2018     INDICATION: N39.0-Urinary tract infection, site not specified;  M79.604-Pain in right leg; M79.605-Pain in left leg; R26.2-Difficulty in  walking, not elsewhere classified; Z86.79-Personal history of other  diseases of the circulatory system; Z86.79-Personal history of other  diseases of the circulatory system; Z91.81-History of falling;  R33.8-Other         TECHNIQUE: Sagittal T1-T2 STIR and axial T2-weighted images of the  lumbar spine.     COMPARISON: NONE     FINDINGS: Patient indicates lower back pain radiating to right leg,  uncomfortable standing, lying flat.     Images are significantly motion degraded throughout the study. There is  straightening of the normal lumbar lordosis. No compression deformity or  significant focal subluxation is seen. There is multilevel canal  narrowing due to disc protrusions and posterior element hypertrophy.  There appears to be only mild edema associated with degenerative disc  changes, particularly at L5-S1, and no evidence of underlying pathologic  lesion.     Axial images show canal borderline stenotic at T12-L1.     At L1-2, canal appears normal diameter. Foramina appear lower limits of  normal.     At L2-3, there is mild asymmetric canal narrowing due to facet  osteophytes and irregular disc bulge. Foramina appear normal on the  right and mildly narrowed on the left.     At L3-4, there is mild to moderate canal stenosis, with large facet  osteophyte and disc protrusion causing significant narrowing of the left  one third of  bony canal, and causing thecal sac to appear deviated to  the right. Right neural foramen is at least moderately narrowed. Left  neural foramen appears obliterated by bony hypertrophic change.     At L4-5, there is moderate canal stenosis due to posterior element  hypertrophy and disc protrusion. There is again high-grade left-sided  foraminal stenosis and there appears to be only mild right-sided  foraminal stenosis.     At L5-S1, there is a central disc protrusion and osteophyte, with what  is thought to be significant narrowing of the left one half of the canal  and milder narrowing of the right one half of the canal. There appears  to be left lateral recess stenosis as well whether due to disc  protrusion or facet osteophyte. Left neural foramen is markedly  narrowed. Right neural foramen appears practically completely effaced.       Impression:       1. Heavily motion degraded exam. No evidence of acute trauma or  significant focal subluxation.  2. High-grade multilevel foraminal stenosis, including moderate right  and marked to severe left-sided foraminal stenosis at L3-4, marked  left-sided foraminal stenosis at L4-5, marked bilateral foraminal  stenosis at L5-S1.  3. Asymmetric mild to moderate canal stenosis at L3-4, moderate canal  stenosis at L4-5 and milder L2-3 canal stenosis. Bony hypertrophic  change causes irregular canal narrowing. Please see above complete  description by disc level.     D:  12/04/2018  E:  12/04/2018           Results for orders placed during the hospital encounter of 12/01/18   Adult Transthoracic Echo Complete W/ Cont if Necessary Per Protocol    Narrative · Left ventricular systolic function is normal.  · Estimated EF appears to be in the range of 56 - 60%  · Left ventricular diastolic dysfunction (grade I) consistent with   impaired relaxation.  · Trace to mild tricuspid valve regurgitation is present. Estimated right   ventricular systolic pressure from tricuspid  "regurgitation is normal (<35   mmHg)          I have reviewed the medications.      amLODIPine-lisinopril 5-20 mg combo dose  Oral Q24H   ceftriaxone 1 g Intravenous Q24H   dexamethasone 4 mg Intravenous Q8H   docusate sodium 100 mg Oral BID   finasteride 5 mg Oral Daily   gabapentin 100 mg Oral Q8H   saccharomyces boulardii 250 mg Oral BID   sodium chloride 3 mL Intravenous Q12H   thiamine 100 mg Oral Daily   vitamin B-12 1,000 mcg Oral Daily         Assessment/Plan   Assessment / Plan     Active Hospital Problems    Diagnosis   • **Generalized weakness   • Acute UTI   • Acute UTI (urinary tract infection)   • Anemia   • Pain in both lower extremities   • Hyponatremia   • Urinary retention   • Prediabetes   • Paroxysmal atrial fibrillation (CMS/HCC)   • HTN (hypertension)          Brief Hospital Course to date:  Sandip Feliz is a 87 y.o. male with PMH of HTN, BPH, chronic pain, GERD, Afib on chronic Xarelto but has been on hold due to hematuria, and recent admission in October for urinary retention s/p reduction of paraphimosis and cystoscopy with clot evacuation by Dr. Rausch with removal of Shaffer approximately three weeks ago who presents with complaints of generalized weakness and acute urinary retention.        Assessment & Plan:  --L5-S1 Radiculopathy- subacute/chronic per EMG/NCS 12/3 with acute inability to walk and urinary retention.  Neurology started Gabapentin and have now signed off.  Will obtain MRI L spine to further evaluate, started IV Decadron to alleviate pain and hopefully aid in pt ability to lay flat for MRI.  Consult NSGY pending MRI results.   --UTI- UCx only growing 20-30K E coli (susceptible to Rocephin), however, given his recent history, will treat as UTI for time being. Continue IV Rocephin   --Hyponatremia- holding HCTZ, also could be chronic from EtOH use (drinks \"one drink of vodka\" daily). Better today  --Urinary retention- s/p Shaffer placement, consulted Urology, appreciate " their input  --PAF- continue rate control, Xarelto on hold per Urology's recs last admission.   --HTN- continue home meds    DVT Prophylaxis:  mechanical    CODE STATUS:   Code Status and Medical Interventions:   Ordered at: 12/01/18 2021     Level Of Support Discussed With:    Patient     Code Status:    CPR     Medical Interventions (Level of Support Prior to Arrest):    Full       Disposition: I expect the patient to be discharged TBD      Electronically signed by Flavia Pavon MD, 12/04/18, 11:16 AM.      Electronically signed by Flavia Pavon MD at 12/4/2018 11:19 AM          Consult Notes (last 72 hours) (Notes from 12/1/2018  2:35 PM through 12/4/2018  2:35 PM)      Ephraim Aguero PA-C at 12/4/2018  1:25 PM          Consults    Referring Provider: Librado BATES    Patient Care Team:  Kelly Robison MD as PCP - General (Family Medicine)  Kelly Robison MD as PCP - Claims Attributed    Chief Complaint: Low back and bilateral lower extremity pain and urinary retention    Subjective .     History of present illness:       Patient is an 87-year-old male who has a very brief past medical history.  Patient has been very healthy up until January of this year.  In January patient started noticing he is having more pain with his low back and legs whenever he would walk for distance and it has decreases activity for approximately 11 months.  About 11 months who is living completely independent and ambulating freely with assistance.  Patient is an avid traveler with his wife who is at bedside to help with history.    Patient was having an acute exacerbation of low back pain when he started retaining urine.  Patient underwent a procedure with Dr. Marlow in October and an indwelling catheter was left for 8 weeks and then he was trialed off of the Shaffer patient was unable to empty his bladder.     Patient's urinary retention got exquisitely worse on Friday that was associated with new low back and leg  "pain.  Patient was ambulating with a walker up until Friday.  Patient states his pain is worse whenever he lays flat and feels significantly better when he is sitting upright especially leaning forward.  Patient is set the last few nights upright in a chair slumped forward.  She also states that whenever he stands up to walk his pain is drastically worse.  Patient is comfortable and can \"live with the pain\" when he is sitting.     Since admission patient's symptoms have worsened to where he is much more comfortable but still having issues when trying to lay flat.  The MRI was impossible at first due to the patient's pain when lying flat, but he got an MRI of the lumbar spine that shows quite a bit of motion artifact but does reveal some lumbar stenosis and lateral recess stenosis at L5-S1 and L4 5 on the left.       Review of Systems    History  Past Medical History:   Diagnosis Date   • A-fib (CMS/HCC)    • Arthritis    • Shaffer catheter in place    • GERD (gastroesophageal reflux disease)    • Hearing impairment    • Hearing loss     both ears hearing aides   • Heartburn    • Hypertension    • Melanoma (CMS/HCC)    • Wears dentures    • Wears dentures     partial bottom full at top   • Wears glasses    • Wears hearing aid    ,   Past Surgical History:   Procedure Laterality Date   • APPENDECTOMY     • CATARACT EXTRACTION     • COLONOSCOPY      2 years ago   • SKIN CANCER EXCISION      MELANOMA   • STOMACH SURGERY     ,   Family History   Problem Relation Age of Onset   • Cancer Mother    • Stroke Father    • Prostate cancer Brother    • Prostate cancer Brother    • Prostate cancer Brother    ,   Social History     Tobacco Use   • Smoking status: Former Smoker     Types: Pipe     Last attempt to quit: 1970     Years since quittin.9   • Smokeless tobacco: Never Used   • Tobacco comment: quit over 40 years ago    Substance Use Topics   • Alcohol use: Yes     Comment: 1-2 drinks a night   • Drug use: No   , "   Medications Prior to Admission   Medication Sig Dispense Refill Last Dose   • acetaminophen (TYLENOL) 500 MG tablet Take 1,000 mg by mouth Every 6 (Six) Hours As Needed for Mild Pain .   10/30/2018 at 0500   • amLODIPine-benazepril (LOTREL 5-20) 5-20 MG per capsule Take 1 capsule by mouth Daily.   10/30/2018 at 0500   • cefdinir (OMNICEF) 300 MG capsule Take 1 capsule by mouth 2 (Two) Times a Day. 10 capsule 0 10/30/2018 at 0500   • Cholecalciferol (VITAMIN D3) 2000 units tablet Take  by mouth Daily.   10/29/2018 at 0800   • denosumab (PROLIA) 60 MG/ML solution syringe Inject 60 mg under the skin every 6 (six) months.   10/23/2018   • docusate sodium 100 MG capsule Take 1 capsule by mouth 2 (Two) Times a Day. 20 each 0    • finasteride (PROSCAR) 5 MG tablet Take 1 tablet by mouth Daily. 30 tablet 0 10/29/2018 at 1200   • HYDROcodone-acetaminophen (NORCO) 5-325 MG per tablet Take 1 tablet by mouth Every 6 (Six) Hours As Needed 30 tablet 0    • Multiple Vitamins-Minerals (MULTIVITAMIN MEN 50+ PO) Take  by mouth Daily.   10/29/2018 at 0800   • nystatin (MYCOSTATIN) 305337 UNIT/GM ointment Apply  topically to the appropriate area as directed 2 (Two) Times a Day. Treat for seven days 30 g 0 10/29/2018   • raNITIdine (ZANTAC) 300 MG tablet Take 300 mg by mouth As Needed for Indigestion or Heartburn.   Past Month at Unknown time   • rivaroxaban (XARELTO) 10 MG tablet Take 1 tablet by mouth Daily. Resume after follow-up with Dr. Miracle Bustillos 30 tablet     • saccharomyces boulardii (FLORASTOR) 250 MG capsule Take 1 capsule by mouth 2 (Two) Times a Day. (Patient taking differently: Take 250 mg by mouth As Needed.) 30 capsule 0 Past Week at Unknown time   • tamsulosin (FLOMAX) 0.4 MG capsule 24 hr capsule Take 1 capsule by mouth Daily. 30 capsule 0 10/29/2018 at 0800   • triamterene-hydrochlorothiazide (MAXZIDE-25) 37.5-25 MG per tablet Take 1 tablet by mouth Daily.   10/30/2018 at 0500   • vitamin B-12 (CYANOCOBALAMIN) 1000  MCG tablet Take 1,000 mcg by mouth Daily.   10/29/2018 at 0800   , Scheduled Meds:    amLODIPine-lisinopril 5-20 mg combo dose  Oral Q24H   ceftriaxone 1 g Intravenous Q24H   dexamethasone 4 mg Intravenous Q8H   docusate sodium 100 mg Oral BID   finasteride 5 mg Oral Daily   gabapentin 100 mg Oral Q8H   saccharomyces boulardii 250 mg Oral BID   sodium chloride 3 mL Intravenous Q12H   thiamine 100 mg Oral Daily   vitamin B-12 1,000 mcg Oral Daily   , Continuous Infusions:   , PRN Meds:  •  acetaminophen  •  HYDROcodone-acetaminophen  •  potassium chloride **OR** potassium chloride **OR** potassium chloride  •  [COMPLETED] Insert peripheral IV **AND** sodium chloride  •  sodium chloride and Allergies:  Patient has no known allergies.   SMOKING STATUS: Nonsmoker  Objective     Vital Signs   Temp:  [97.8 °F (36.6 °C)-100.2 °F (37.9 °C)] 98.1 °F (36.7 °C)  Heart Rate:  [83-87] 83  Resp:  [18] 18  BP: (127-164)/(67-87) 128/74  Body mass index is 23.3 kg/m².    Physical Exam:   GENERAL:   The patient is in no acute distress, and is able to answer all questions appropriately.  Neck: Supple without lymphadenopathy  Musculoskeletal:     strength is 4 out of 5 bilaterally.   Shoulder abduction is 4 out of 5.    Dorsiflexion is 2/5 right and 5 out of 5 on left.  Plantarflexion is strong and equal bilaterally  Hip Flexion 5/5 bilaterally.    The patient’s gait not tested secondary to pain  Neurologic:   The patient is alert and oriented by 3.     Pupils are equal and reactive to light.    Visual fields are full.    Extraocular movements are intact without nystagmus.    There is no evidence of central motor drift. No facial droop.  No difficulty with rapid alternating movements.    Sensation is equal bilaterally with no deficit.      Reflexes:  1+ @ biceps, triceps, brachioradialis, as well as the patellar and Achilles tendon bilaterally.  No Felicia's, clonus, or long track signs noted    Results Review:   I reviewed the  patient's new imaging results and agree with the interpretation.  Discussed with Dr. Reyna.  MRI of the lumbar spine shows severe left-sided L5-S1 foraminal stenosis.  Patient also has a facet cyst with lateral recess stenosis on left L4 5.     EMG nerve conduction studies show subacute/chronic L5-S1 radiculopathy bilaterally.    Peroneal nerve is normal which is all in considering patient has right-sided foot drop and the majority of the patient's lumbar MRI has pathology on the left.    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  Patient had acute exacerbation of lower extremity and low back pain as well as urinary retention on Friday.  The pain has improved but patient still requiring a Shaffer catheter.    Further recommendations will follow my conversation with Dr. Reyna.    I discussed case with Dr. Pavon who does not believe that this is a full-blown UTI, however is still on antibiotics for prophylactic treatment of colonization and is not septic.     I discussed the patients findings and my recommendations with patient, family and consulting provider    Ephraim Aguero PA-C  12/04/18  1:25 PM    Time: 60 minutes            Electronically signed by Ephraim Aguero PA-C at 12/4/2018  1:42 PM     Fatemeh Ma MD at 12/2/2018  2:07 PM      Consult Orders    1. Inpatient Neurology Consult General [[id number]] ordered by Dulce Valencia MD at 12/01/18 5706                Neurology    Referring Provider: Dr. Valencia    Reason for Consultation: BLE pain      Chief complaint: bilateral leg pain    Subjective .     History of present illness:  Mr. Feliz is a pleasant 87-year-old male with a past medical history  significant for hypertension and A. viri who is admitted to the hospitalist service for bilateral leg pain.  His wife reports that his symptoms of been going on for several weeks, easily since 2018.  He endorses low back pain shooting into both hips and down the lateral and posterior aspect of both thighs and into the posterior forelegs and the dorsal and plantar aspects of both feet.  He has difficulty lying supine due to exacerbation of pain.  At baseline he is able to ambulate with a walker but he has had some acute worsening in pain to where he is unable to bear weight.  On arrival to the ED here CT of the L-spine was attempted but he was unable to lie flat due to pain.  Neurology was consulted for further recommendations.  He denies any bowel incontinence or saddle anesthesia.    Review of Systems: Positive for pain.Otherwise complete review of systems was discussed with the patient and found to be negative except for that mentioned in history of present illness.    History  Past Medical History:   Diagnosis Date   • A-fib (CMS/HCC)    • Arthritis    • Shaffer catheter in place    • GERD (gastroesophageal reflux disease)    • Hearing impairment    • Hearing loss     both ears hearing aides   • Heartburn    • Hypertension    • Melanoma (CMS/HCC)    • Wears dentures    • Wears dentures     partial bottom full at top   • Wears glasses    • Wears hearing aid    ,   Past Surgical History:   Procedure Laterality Date   • APPENDECTOMY     • CATARACT EXTRACTION     • COLONOSCOPY      2 years ago   • SKIN CANCER EXCISION      MELANOMA   • STOMACH SURGERY     ,   Family History   Problem Relation Age of Onset   • Cancer Mother    • Stroke Father    • Prostate cancer Brother    • Prostate cancer Brother    • Prostate cancer Brother    ,   Social History     Tobacco Use   • Smoking status: Former Smoker     Types: Pipe     Last attempt to quit: 1970     Years since quittin.9   • Smokeless tobacco: Never Used    • Tobacco comment: quit over 40 years ago    Substance Use Topics   • Alcohol use: Yes     Alcohol/week: 1.2 - 2.4 oz     Types: 1 - 2 Glasses of wine, 1 - 2 Cans of beer per week     Comment: NIGHTLY   • Drug use: No   ,   Medications Prior to Admission   Medication Sig Dispense Refill Last Dose   • acetaminophen (TYLENOL) 500 MG tablet Take 1,000 mg by mouth Every 6 (Six) Hours As Needed for Mild Pain .   10/30/2018 at 0500   • amLODIPine-benazepril (LOTREL 5-20) 5-20 MG per capsule Take 1 capsule by mouth Daily.   10/30/2018 at 0500   • cefdinir (OMNICEF) 300 MG capsule Take 1 capsule by mouth 2 (Two) Times a Day. 10 capsule 0 10/30/2018 at 0500   • Cholecalciferol (VITAMIN D3) 2000 units tablet Take  by mouth Daily.   10/29/2018 at 0800   • denosumab (PROLIA) 60 MG/ML solution syringe Inject 60 mg under the skin every 6 (six) months.   10/23/2018   • docusate sodium 100 MG capsule Take 1 capsule by mouth 2 (Two) Times a Day. 20 each 0    • finasteride (PROSCAR) 5 MG tablet Take 1 tablet by mouth Daily. 30 tablet 0 10/29/2018 at 1200   • HYDROcodone-acetaminophen (NORCO) 5-325 MG per tablet Take 1 tablet by mouth Every 6 (Six) Hours As Needed 30 tablet 0    • Multiple Vitamins-Minerals (MULTIVITAMIN MEN 50+ PO) Take  by mouth Daily.   10/29/2018 at 0800   • nystatin (MYCOSTATIN) 447235 UNIT/GM ointment Apply  topically to the appropriate area as directed 2 (Two) Times a Day. Treat for seven days 30 g 0 10/29/2018   • raNITIdine (ZANTAC) 300 MG tablet Take 300 mg by mouth As Needed for Indigestion or Heartburn.   Past Month at Unknown time   • rivaroxaban (XARELTO) 10 MG tablet Take 1 tablet by mouth Daily. Resume after follow-up with Dr. Miracle Bustillos 30 tablet     • saccharomyces boulardii (FLORASTOR) 250 MG capsule Take 1 capsule by mouth 2 (Two) Times a Day. (Patient taking differently: Take 250 mg by mouth As Needed.) 30 capsule 0 Past Week at Unknown time   • tamsulosin (FLOMAX) 0.4 MG capsule 24 hr capsule  "Take 1 capsule by mouth Daily. 30 capsule 0 10/29/2018 at 0800   • triamterene-hydrochlorothiazide (MAXZIDE-25) 37.5-25 MG per tablet Take 1 tablet by mouth Daily.   10/30/2018 at 0500   • vitamin B-12 (CYANOCOBALAMIN) 1000 MCG tablet Take 1,000 mcg by mouth Daily.   10/29/2018 at 0800    and Allergies:  Patient has no known allergies.    Objective     Vital Signs   Blood pressure 140/78, pulse 75, temperature 98.8 °F (37.1 °C), temperature source Oral, resp. rate 18, height 165.1 cm (65\"), weight 63.5 kg (140 lb), SpO2 95 %.    Physical Exam:      Gen: Sitting up in bed with eyes open. In NAD. Appears stated age   Eyes: PERRL, conjuntivae/lids normal   ENT: External canals normal bilaterally. Oropharynx normal.    Neck: Supple. No thyroid enlargement noted   Respiratory: CTA bilaterally. Respirations unlabored   CV: Irregular rhythm, S1 and S2 nml. Radial pulses 2+ bilaterally.    Skin: No rashes noted on exposed skin. Normal turgor.   MSK: Normal bulk and tone. Nml ROM     Neurologic:   Mental status: Awake, alert, oriented x3. Follows commands. Speech fluent.    CN: PERRL, EOM intact, sensation intact in upper/mid/lower face bilaterally, facial movements symmetric, hearing intact to finger rub bilaterally, palate elevates symmetrically, tongue movements and SCMs strong bilaterally    Motor: Strength full (5/5) throughout in BUE and BLE    Reflexes: 2+ in the upper extremity is bilaterally and 1+ patellar reflexes bilaterally.  Unable to elicit ankle jerk due to poor patient cooperation as he continued to actively dorsiflex his feet.  No clonus noted   Sensory: Intact to LT and temperature throughout   Coordination: No dysmetria noted   Gait: Not tested        Results Reviewed:     Labs reviewed  EKG report reviewed                 Assessment/Plan     1.  Bilateral leg pain = concerning for referred pain from lumbar degenerative disc disease versus bilateral sciatic neuropathy.  Recommend reattempting MRI L-spine " with pain control if possible.  We will send for EMG/NCV in AM. Trial of gabapentin 100mg TID (titrating as tolerated). PT/OT eval.        Fatemeh Ma MD  12/02/18  2:07 PM              Electronically signed by Fatemeh Ma MD at 12/2/2018  2:12 PM     Ten Child MD at 12/2/2018 12:53 PM          Urology Consult    Referring Provider: Librado  Reason for Consultation:Urinary retention    Patient Care Team:  Kelly Robison MD as PCP - General (Family Medicine)  Kelly Robison MD as PCP - Claims Attributed    Chief complaint unable to void    Subjective .     History of present illness: A 87-year-old gentleman in acute urinary retention.  Patient's previous the undergone a laser vaporization of the prostate.  Patient's catheter was removed in October and he has been voiding well until recently.  He's been having some dysuria and increased urinary frequency.  There is been no hematuria.  Currently Shaffer catheters in place and antibiotics have been started for urinary tract infection    Review of Systems  Pertinent items are noted in HPI    History  Past Medical History:   Diagnosis Date   • A-fib (CMS/HCC)    • Arthritis    • Shaffer catheter in place    • GERD (gastroesophageal reflux disease)    • Hearing impairment    • Hearing loss     both ears hearing aides   • Heartburn    • Hypertension    • Melanoma (CMS/HCC)    • Wears dentures    • Wears dentures     partial bottom full at top   • Wears glasses    • Wears hearing aid        Objective     Vital Signs   Temp:  [97.7 °F (36.5 °C)-98.8 °F (37.1 °C)] 98.8 °F (37.1 °C)  Heart Rate:  [] 75  Resp:  [16-18] 18  BP: (117-147)/(61-93) 140/78    Physical Exam:     General Appearance:    Alert, cooperative, in no acute distress   Head:    Normocephalic, without obvious abnormality, atraumatic   Eyes:            Lids and lashes normal, conjunctivae and sclerae normal, no   icterus, no pallor, corneas clear, PERRLA   Ears:    Ears appear  intact with no abnormalities noted   Throat:   No oral lesions, no thrush, oral mucosa moist   Neck:   No adenopathy, supple, trachea midline, no thyromegaly, no   carotid bruit, no JVD   Back:     No kyphosis present, no scoliosis present, no skin lesions,      erythema or scars, no tenderness to percussion or                   palpation,   range of motion normal   Lungs:     Clear to auscultation,respirations regular, even and                  unlabored    Heart:    Regular rhythm and normal rate, normal S1 and S2, no            murmur, no gallop, no rub, no click   Chest Wall:    No abnormalities observed   Abdomen:     Normal bowel sounds, no masses, no organomegaly, soft        non-tender, non-distended, no guarding, no rebound                tenderness   Rectal:     Deferred   Extremities:   Moves all extremities well, no edema, no cyanosis, no             redness   Pulses:   Pulses palpable and equal bilaterally   Skin:   No bleeding, bruising or rash   Lymph nodes:   No palpable adenopathy   Neurologic:   Cranial nerves 2 - 12 grossly intact, sensation intact, DTR       present and equal bilaterally       Results Review:   I reviewed the patient's new clinical results.    Lab Results (last 72 hours)     Procedure Component Value Units Date/Time    Vitamin B12 [[id number]]  (Normal) Collected:  12/02/18 0721    Specimen:  Blood Updated:  12/02/18 0816     Vitamin B-12 676 pg/mL     Folate [[id number]]  (Abnormal) Collected:  12/02/18 0721    Specimen:  Blood Updated:  12/02/18 0816     Folate >24.00 ng/mL      Comment: Results may be falsely increased if patient taking Biotin.       Narrative:       Folate Reference Ranges:    Deficient:            Less than 1.2 ng/mL  Indeterminant:        1.2-3.1 ng/mL  Normal:               3.2-20.0 ng/mL    Basic Metabolic Panel [[id number]]  (Abnormal) Collected:  12/02/18 0721    Specimen:  Blood Updated:  12/02/18 0816     Glucose 112 mg/dL      BUN 14 mg/dL       Creatinine 0.75 mg/dL      Sodium 133 mmol/L      Potassium 4.0 mmol/L      Chloride 101 mmol/L      CO2 23.0 mmol/L      Calcium 8.6 mg/dL      eGFR Non African Amer 99 mL/min/1.73      BUN/Creatinine Ratio 18.7     Anion Gap 9.0 mmol/L     Narrative:       National Kid[employer] Guidelines    Stage     Description        GFR  1         Normal or High     90+  2         Mild decrease      60-89  3         Moderate decrease  30-59  4         Severe decrease    15-29  5         Kidney failure     <15    The MDRD GFR formula is only valid for adults with stable renal function between ages 18 and 70.    TSH [[id number]]  (Normal) Collected:  12/02/18 0721    Specimen:  Blood Updated:  12/02/18 0816     TSH 0.971 mIU/mL     Hemoglobin A1c [[id number]]  (Abnormal) Collected:  12/02/18 0721    Specimen:  Blood Updated:  12/02/18 0806     Hemoglobin A1C 6.70 %     Narrative:       The American Diabetes Association recommends maintenance of Hemoglobin A1C at 7.0% or lower. Goals for Hemoglobin A1C reduction may need to be modified if hypoglycemia is a problem.    CBC (No Diff) [[id number]]  (Abnormal) Collected:  12/02/18 0721    Specimen:  Blood Updated:  12/02/18 0757     WBC 8.19 10*3/mm3      RBC 4.02 10*6/mm3      Hemoglobin 10.3 g/dL      Hematocrit 33.6 %      MCV 83.6 fL      MCH 25.6 pg      MCHC 30.7 g/dL      RDW 15.3 %      RDW-SD 46.9 fl      MPV 9.2 fL      Platelets 377 10*3/mm3     Urine Culture - Urine, Urine, Clean Catch [[id number]]  (Abnormal) Collected:  12/01/18 1402    Specimen:  Urine, Clean Catch Updated:  12/02/18 0703     Urine Culture 20,000-30,000 CFU/mL Gram Negative Bacilli    Cortisol [[id number]] Collected:  12/01/18 2300    Specimen:  Blood Updated:  12/02/18 0636     Cortisol 12.50 mcg/dL     Sodium, Urine, Random - Urine, Clean Catch [[id number]]  (Abnormal) Collected:  12/01/18 2131    Specimen:  Urine, Clean Catch Updated:  12/01/18 2139     Sodium, Urine 94 mmol/L     Osmolality, Urine -  Urine, Clean Catch [[id number]]  (Normal) Collected:  12/01/18 2131    Specimen:  Urine, Clean Catch Updated:  12/01/18 2137     Osmolality, Urine 422 mOsm/kg     Osmolality, Serum [[id number]]  (Normal) Collected:  12/01/18 2053    Specimen:  Blood Updated:  12/01/18 2116     Osmolality 282 mOsm/kg     Comprehensive Metabolic Panel [[id number]]  (Abnormal) Collected:  12/01/18 1405    Specimen:  Blood Updated:  12/01/18 1450     Glucose 116 mg/dL      BUN 17 mg/dL      Creatinine 0.89 mg/dL      Sodium 126 mmol/L      Potassium 3.8 mmol/L      Chloride 97 mmol/L      CO2 22.0 mmol/L      Calcium 8.9 mg/dL      Total Protein 6.8 g/dL      Albumin 4.23 g/dL      ALT (SGPT) 17 U/L      AST (SGOT) 23 U/L      Alkaline Phosphatase 107 U/L      Total Bilirubin 0.4 mg/dL      eGFR Non African Amer 81 mL/min/1.73      Globulin 2.6 gm/dL      A/G Ratio 1.6 g/dL      BUN/Creatinine Ratio 19.1     Anion Gap 7.0 mmol/L     Narrative:       [employer] Guidelines    Stage     Description        GFR  1         Normal or High     90+  2         Mild decrease      60-89  3         Moderate decrease  30-59  4         Severe decrease    15-29  5         Kidney failure     <15    The MDRD GFR formula is only valid for adults with stable renal function between ages 18 and 70.    BNP [[id number]]  (Normal) Collected:  12/01/18 1405    Specimen:  Blood Updated:  12/01/18 1449     BNP 39.0 pg/mL      Comment: Results may be falsely decreased if patient taking Biotin.       Urinalysis With Culture If Indicated - Urine, Clean Catch [[id number]]  (Abnormal) Collected:  12/01/18 1402    Specimen:  Urine, Clean Catch Updated:  12/01/18 1446     Color, UA Dark Yellow     Appearance, UA Turbid     pH, UA 5.5     Specific Gravity, UA 1.011     Glucose, UA Negative     Ketones, UA Trace     Bilirubin, UA Negative     Blood, UA Large (3+)     Protein, UA Trace     Leuk Esterase, UA Large (3+)     Nitrite, UA Positive     Urobilinogen, UA  0.2 E.U./dL    Urinalysis, Microscopic Only - Urine, Clean Catch [[id number]]  (Abnormal) Collected:  12/01/18 1402    Specimen:  Urine, Clean Catch Updated:  12/01/18 1446     RBC, UA 21-30 /HPF      WBC, UA Too Numerous to Count /HPF      Bacteria, UA 1+ /HPF      Squamous Epithelial Cells, UA 0-2 /HPF      Hyaline Casts, UA 0-6 /LPF      Methodology Manual Light Microscopy    CBC & Differential [[id number]] Collected:  12/01/18 1405    Specimen:  Blood Updated:  12/01/18 1426    Narrative:       The following orders were created for panel order CBC & Differential.  Procedure                               Abnormality         Status                     ---------                               -----------         ------                     CBC Auto Differential[[id number]]        Abnormal            Final result                 Please view results for these tests on the individual orders.    CBC Auto Differential [[id number]]  (Abnormal) Collected:  12/01/18 1405    Specimen:  Blood Updated:  12/01/18 1426     WBC 7.06 10*3/mm3      RBC 4.11 10*6/mm3      Hemoglobin 10.5 g/dL      Hematocrit 33.8 %      MCV 82.2 fL      MCH 25.5 pg      MCHC 31.1 g/dL      RDW 14.9 %      RDW-SD 45.2 fl      MPV 9.2 fL      Platelets 386 10*3/mm3      Neutrophil % 73.3 %      Lymphocyte % 12.2 %      Monocyte % 11.8 %      Eosinophil % 2.1 %      Basophil % 0.6 %      Immature Grans % 0.3 %      Neutrophils, Absolute 5.18 10*3/mm3      Lymphocytes, Absolute 0.86 10*3/mm3      Monocytes, Absolute 0.83 10*3/mm3      Eosinophils, Absolute 0.15 10*3/mm3      Basophils, Absolute 0.04 10*3/mm3      Immature Grans, Absolute 0.02 10*3/mm3     POCT Urinalysis, dipstick [[id number]]  (Abnormal) Collected:  12/01/18 1414    Specimen:  Urine Updated:  12/01/18 1416     Color Straw     Clarity, UA Cloudy     Glucose, UA Negative mg/dL      Bilirubin Negative     Ketones, UA Trace     Specific Gravity  1.025     Blood, UA 3+     pH, Urine 6.0      Protein, POC Trace mg/dL      Urobilinogen, UA Normal     Leukocytes Moderate (2+)     Nitrite, UA Positive        Imaging Results (last 24 hours)     Procedure Component Value Units Date/Time    XR Chest 1 View [[id number]] Collected:  12/01/18 1631     Updated:  12/02/18 1104    Narrative:          EXAMINATION: XR CHEST 1 VW - 12/01/2018     INDICATION: Shortness of air.     COMPARISON: 10/22/2018     FINDINGS: Portable chest reveals the heart to be enlarged. Underlying  chronic changes seen in lung fields bilaterally with slight prominence  of the pulmonary vascularity. Degenerative changes seen within the  spine. No pleural effusion or pneumothorax. Chronic changes seen within  the lung bases. Slight prominence of the pulmonary vascularity.           Impression:       Enlargement of the heart with slight prominence of the  pulmonary vascularity. No evidence of acute parenchymal disease.     DICTATED:   12/01/2018  EDITED/ls :   12/01/2018      This report was finalized on 12/2/2018 11:02 AM by Dr. Fozia Franco MD.             Medications:  Current Facility-Administered Medications   Medication Dose Route Frequency Provider Last Rate Last Dose   • acetaminophen (TYLENOL) tablet 1,000 mg  1,000 mg Oral Q6H PRN Hillary Bowie APRN       • amLODIPine (NORVASC) 5 mg, lisinopril (PRINIVIL,ZESTRIL) 20 mg   Oral Q24H Hillary Bowie APRN       • cefTRIAXone (ROCEPHIN) IVPB 1 g  1 g Intravenous Q24H Hillary Bowie APRN 100 mL/hr at 12/02/18 0828 1 g at 12/02/18 0828   • docusate sodium (COLACE) capsule 100 mg  100 mg Oral BID Hillary Bowie APRN   100 mg at 12/02/18 0825   • finasteride (PROSCAR) tablet 5 mg  5 mg Oral Daily Hillary Bowie APRN   5 mg at 12/02/18 0825   • HYDROcodone-acetaminophen (NORCO) 5-325 MG per tablet 1 tablet  1 tablet Oral Q6H PRN Hillary Bowie APRN   1 tablet at 12/02/18 0828   • saccharomyces boulardii (FLORASTOR) capsule 250 mg  250 mg Oral BID  BowieHillary zarco, APRN   250 mg at 12/02/18 0825   • sodium chloride 0.9 % flush 10 mL  10 mL Intravenous PRN Sea Sierra MD       • sodium chloride 0.9 % flush 3 mL  3 mL Intravenous Q12H Hillary Bowie, APRN   3 mL at 12/01/18 2148   • sodium chloride 0.9 % flush 3-10 mL  3-10 mL Intravenous PRN Hillary Bowie APRN       • sodium chloride 0.9 % infusion  75 mL/hr Intravenous Continuous Hillary Bowie, APRN 75 mL/hr at 12/02/18 1218 75 mL/hr at 12/02/18 1218   • vitamin B-12 (CYANOCOBALAMIN) tablet 1,000 mcg  1,000 mcg Oral Daily Hillary Bowie, APRN   1,000 mcg at 12/02/18 0825       Assessment/Plan       Generalized weakness    HTN (hypertension)    Paroxysmal atrial fibrillation (CMS/HCC)    Urinary retention    Prediabetes    Hyponatremia    Acute UTI (urinary tract infection)    Anemia    Pain in both lower extremities    Acute UTI      Impression: Urinary retention and UTI    Plan: Continue catheter and antibiotics for now    I discussed the patients findings and my recommendations with patient and family    Ten Child MD  12/02/18  12:53 PM              Electronically signed by Ten Child MD at 12/2/2018 12:56 PM                                            Margo Love, PT   Physical Therapist   Physical Therapy          Therapy Evaluation   Signed        Date of Service:  12/4/2018 11:30 AM                            Signed                Expand All Collapse All                      Expand widget buttonCollapse widget button        Show:Clear all      ManualTemplateCopied    Added by:          Margo Love, FABIANA Serrano for detailscustomization button                                                                                                                                                                                    Acute Care - Physical Therapy Initial Evaluation     Be         Patient Name: Sandip BENAVIDES Tray                  : 1931                      MRN: [id number]    Today's Date: 2018                               Onset of Illness/Injury or Date of Surgery: 18    Date of Referral to PT: 18                  Referring Physician: PITO Bowie                        Admit Date: 2018                          Visit Dx:                   ICD-10-CM     ICD-9-CM       1.     Acute UTI     N39.0     599.0       2.     Bilateral lower extremity pain     M79.604     729.5             M79.605             3.     Unable to ambulate     R26.2     719.7       4.     History of atrial fibrillation     Z86.79     V12.59       5.     History of hypertension     Z86.79     V12.59       6.     At high risk for falls     Z91.81     V15.88       7.     Acute urinary retention     R33.8     788.29       8.     Impaired functional mobility, balance, gait, and endurance     Z74.09     V49.89                  Patient Active Problem List       Diagnosis       •     HTN (hypertension)       •     Osteoporosis       •     Arthritis of knee, left       •     S/P left unicompartmental knee replacement       •     Acute blood loss anemia, mild, asymptomatic       •     Paroxysmal atrial fibrillation (CMS/HCC)       •     Acute urinary retention       •     Hematuria       •     Urinary retention       •     S/P greenlight laser vaporization of prostate       •     Prediabetes       •     Leukocytosis, mild, likely reactive       •     Hyponatremia       •     Acute UTI (urinary tract infection)       •     Anemia       •     Generalized weakness       •     Pain in both lower extremities       •     Acute UTI            Medical History              Past Medical History:       Diagnosis     Date       •     A-fib (CMS/HCC)             •     Arthritis             •     Shaffer catheter in place             •     GERD (gastroesophageal reflux disease)             •     Hearing impairment             •     Hearing loss                    both ears hearing aides       •     Heartburn             •     Hypertension             •     Melanoma (CMS/HCC)             •     Wears dentures             •     Wears dentures                   partial bottom full at top       •     Wears glasses             •     Wears hearing aid                     Surgical History               Past Surgical History:       Procedure     Laterality     Date       •     APPENDECTOMY                   •     CATARACT EXTRACTION                   •     COLONOSCOPY                         2 years ago       •     SKIN CANCER EXCISION                         [facility]       •     STOMACH SURGERY                                                 PT ASSESSMENT (last 12 hours)                               Physical Therapy Evaluation                                    Row Name       12/04/18 1130                                    PT Evaluation Time/Intention             Subjective Information      complains of;pain;weakness  -MIKO                   Document Type      evaluation  -MIKO                   Mode of Treatment      physical therapy  -MIKO                   Patient Effort      good  -MIKO                                           Row Name       12/04/18 1130                                    General Information             Patient Profile Reviewed?      yes  -MIKO                   Onset of Illness/Injury or Date of Surgery      12/01/18  -MIKO                   Referring Physician      PITO Bowie  -MIKO                   Patient Observations      alert;cooperative;agree to therapy  -MIKO                   Patient/Family Observations      wife present and supportive  -MIKO                   Prior Level of Function      independent:;gait;transfer;bed mobility;ADL's  -MIKO                   Equipment Currently Used at Home      bath bench;commode, bedside;walker, rolling  -MIKO                   Pertinent History of Current Functional Problem      patient went to the ED with LE pain with  inability to ambulate. he also had urinary retension and was found to have UTI  -MIKO                   Existing Precautions/Restrictions      fall  -MIKO                   Risks Reviewed      patient:;spouse/S.O.:;LOB;increased discomfort  -MIKO                   Benefits Reviewed      patient:;spouse/S.O.:;increase strength;increase balance;decrease risk of DVT  -MIKO                   Barriers to Rehab      previous functional deficit  -MIKO                                           Row Name       12/04/18 1130                                    Relationship/Environment             Primary Source of Support/Comfort      spouse  -MIKO                   Lives With      spouse  -MIKO                                           Row Name       12/04/18 1130                                    Resource/Environmental Concerns             Current Living Arrangements      home/apartment/condo  -MIKO                   Resource/Environmental Concerns      none  -MIKO                                           Row Name       12/04/18 1130                                    Home Main Entrance             Number of Stairs, Main Entrance      two  -MIKO                                           Row Name       12/04/18 1130                                    Cognitive Assessment/Intervention- PT/OT             Orientation Status (Cognition)      oriented x 4  -MIKO                   Follows Commands (Cognition)      follows one step commands;over 90% accuracy  -MIKO                   Safety Deficit (Cognitive)      awareness of need for assistance;safety precautions awareness;safety precautions follow-through/compliance  -MIKO                                           Row Name       12/04/18 1130                                    Safety Issues, Functional Mobility             Safety Issues Affecting Function (Mobility)      awareness of need for assistance;insight into deficits/self awareness;safety precaution awareness;safety precautions  follow-through/compliance  -MIKO                   Impairments Affecting Function (Mobility)      balance;endurance/activity tolerance;pain;strength;postural/trunk control  -                                           Row Name       12/04/18 1130                                    Bed Mobility Assessment/Treatment             Bed Mobility Assessment/Treatment      supine-sit;scooting/bridging;rolling left;rolling right  -MIKO                   Rolling Left [place] (Bed Mobility)      contact guard  -MIKO                   Rolling Right [place] (Bed Mobility)      contact guard  -MIKO                   Scooting/Bridging [place] (Bed Mobility)      minimum assist (75% patient effort)  -MIKO                   Supine-Sit [place] (Bed Mobility)      contact guard  -MIKO                   Bed Mobility, Safety Issues      decreased use of arms for pushing/pulling;decreased use of legs for bridging/pushing  -                                           Row Name       12/04/18 1130                                    Transfer Assessment/Treatment             Transfer Assessment/Treatment      bed-chair transfer;sit-stand transfer;stand-sit transfer  -                   Bed-Chair [place] (Transfers)      moderate assist (50% patient effort);2 person assist  -                   Assistive Device (Bed-Chair Transfers)      walker, front-wheeled  -                   Sit-Stand [place] (Transfers)      moderate assist (50% patient effort);2 person assist  -MIKO                   Stand-Sit [place] (Transfers)      moderate assist (50% patient effort);2 person assist  -                                           Row Name       12/04/18 1130                                    Sit-Stand Transfer             Assistive Device (Sit-Stand Transfers)      walker, front-wheeled  -                                           Row Name       12/04/18 1130                                    Stand-Sit Transfer              Assistive Device (Stand-Sit Transfers)      walker, front-wheeled  -MIKO                                           Row Name       12/04/18 1130                                    Gait/Stairs Assessment/Training             Gait/Stairs Assessment/Training      gait/ambulation independence  -MIKO                   [place] Level (Gait)      moderate assist (50% patient effort);2 person assist  -MIKO                   Assistive Device (Gait)      walker, front-wheeled  -MIKO                   Distance in Feet (Gait)      10  -MIKO                   Pattern (Gait)      step-to  -MIKO                   Deviations/Abnormal Patterns (Gait)      stride length decreased;base of support, narrow;scissoring  -MIKO                   Comment (Gait/Stairs)      patient has narrow base of support and keeps walker out to far from his body he has hip and trunk flexion during ambulation chair brought behind patient for safety he fatigues easily  -MIKO                                           Row Name       12/04/18 1130                                    General ROM             GENERAL ROM COMMENTS      limited with trunk extension no limitations in LE's   -MIKO                                           Row Name       12/04/18 1130                                    MMT (Manual Muscle Testing)             General MMT Comments      generalized wealness 4/5  -MIKO                                           Row Name       12/04/18 1130                                    Motor Assessment/Intervention             Additional Documentation      Therapeutic Exercise (Group);Therapeutic Exercise Interventions (Group)  -MIKO                                           Row Name       12/04/18 1130                                    Therapeutic Exercise             Therapeutic Exercise      seated, lower extremities  -MIKO                   Additional Documentation      Therapeutic Exercise (Row)  -MIKO                                           Row Name        12/04/18 1130                                    Lower Extremity Seated Therapeutic Exercise             Performed, Seated Lower Extremity (Therapeutic Exercise)      hip flexion/extension;knee flexion/extension;ankle dorsiflexion/plantarflexion  -MKIO                   Exercise Type, Seated Lower Extremity (Therapeutic Exercise)      AROM (active range of motion)  -MIKO                   Sets/Reps Detail, Seated Lower Extremity (Therapeutic Exercise)      1/10  -MIKO                                           Row Name       12/04/18 1130                                    Static Sitting Balance             Level of [place] (Unsupported Sitting, Static Balance)      independent  -MIKO                                           Row Name       12/04/18 1130                                    Static Standing Balance             Level of [place] (Supported Standing, Static Balance)      moderate assist, 50 to 74% patient effort  -MIKO                                           Row Name       12/04/18 1130                                    Dynamic Standing Balance             Level of [place], Reaches Outside Midline (Standing, Dynamic Balance)      moderate assist, 50 to 74% patient effort  -MIKO                                           Row Name       12/04/18 1130                                    Pain Scale: Numbers Pre/Post-Treatment             Pain Scale: Numbers, Pretreatment      8/10  -MIKO                   Pain Scale: Numbers, Post-Treatment      8/10  -MIKO                   Pain Location - Side      Bilateral  -MIKO                   Pain Location - Orientation      lower  -MIKO                   Pain Location      extremity  -MIKO                   Pain Intervention(s)      Repositioned;Ambulation/increased activity;Elevated  -MIKO                                           Row Name       12/04/18 1130                                    Coping             Observed Emotional State      calm;cooperative  -MIKO                    Verbalized Emotional State      acceptance  -MIKO                                           Row Name       12/04/18 1130                                    Plan of Care Review             Plan of Care Reviewed With      spouse;patient  -MIKO                                           Row Name       12/04/18 1130                                    Physical Therapy Clinical Impression             Date of Referral to PT      12/04/18  -MIKO                   PT Diagnosis (PT Clinical Impression)      impaired bed mobility transfer and gait decreased strength and balance  -MIKO                   Patient/Family Goals Statement (PT Clinical Impression)      patient to go home with wife when he can ambulat  -MIKO                   Criteria for Skilled Interventions Met (PT Clinical Impression)      yes;treatment indicated  -MIKO                   Rehab Potential (PT Clinical Summary)      good, to achieve stated therapy goals  -MIKO                   Care Plan Review (PT)      evaluation/treatment results reviewed;care plan/treatment goals reviewed;risks/benefits reviewed;patient/other agree to care plan  -                                           Row Name       12/04/18 1130                                    Physical Therapy Goals             Bed Mobility Goal Selection (PT)      bed mobility, PT goal 1  -MIKO                   Transfer Goal Selection (PT)      transfer, PT goal 1  -MIKO                   Gait Training Goal Selection (PT)      gait training, PT goal 1  -                                           Row Name       12/04/18 1130                                    Bed Mobility Goal 1 (PT)             Activity/Assistive Device (Bed Mobility Goal 1, PT)      sit to supine/supine to sit  -MIKO                   [place] Level/Cues Needed (Bed Mobility Goal 1, PT)      independent  -MIKO                   Time Frame (Bed Mobility Goal 1, PT)      long term goal (LTG);10 days  -MIKO                   Progress/Outcomes  (Bed Mobility Goal 1, PT)      goal ongoing  -MIKO                                           Row Name       12/04/18 1130                                    Transfer Goal 1 (PT)             Activity/Assistive Device (Transfer Goal 1, PT)      sit-to-stand/stand-to-sit  -MIKO                   [place] Level/Cues Needed (Transfer Goal 1, PT)      independent  -MIKO                   Time Frame (Transfer Goal 1, PT)      long term goal (LTG);10 days  -MIKO                   Progress/Outcome (Transfer Goal 1, PT)      goal ongoing  -MIKO                                           Row Name       12/04/18 1130                                    Gait Training Goal 1 (PT)             Activity/Assistive Device (Gait Training Goal 1, PT)      gait (walking locomotion)  -MIKO                   [place] Level (Gait Training Goal 1, PT)      contact guard assist  -MIKO                   Distance (Gait Goal 1, PT)      150  -MIKO                   Time Frame (Gait Training Goal 1, PT)      long term goal (LTG);10 days  -MIKO                   Progress/Outcome (Gait Training Goal 1, PT)      goal ongoing  -MIKO                                           Row Name       12/04/18 1130                                    Patient Education Goal (PT)             Activity (Patient Education Goal, PT)      HEP  -MIKO                   [place]/Cues/Accuracy (Memory Goal 2, PT)      verbalizes understanding  -MIKO                   Time Frame (Patient Education Goal, PT)      long term goal (LTG);10 days  -MIKO                   Progress/Outcome (Patient Education Goal, PT)      goal ongoing  -MIKO                                           Row Name       12/04/18 1130                                    Positioning and Restraints             Pre-Treatment Position      in bed  -MIKO                   Post Treatment Position      chair  -MIKO                   In Chair      notified nsg;reclined;sitting;call light within reach;encouraged to call for assist;exit  alarm on;on mechanical lift sling;waffle cushion  -MIKO                                           Row Name       12/04/18 1130                                    Living Environment             Home Accessibility      stairs to enter home  -MIKO                                         User Key      (r) = Recorded By, (t) = Taken By, (c) = Cosigned By                   Initials       Name       Provider Type               Margo De La Cruz PT     Physical Therapist                                   Physical Therapy Education                              Title: PT OT SLP Therapies (In Progress)                                Topic: Physical Therapy (In Progress)                                Point: Mobility training (In Progress)                                Learning Progress Summary                                         Patient     Acceptance, E, NR by MIKO at 12/4/2018 11:30 AM       Significant Other     Acceptance, E, NR by MIKO at 12/4/2018 11:30 AM                                                                      Point: Home exercise program (In Progress)                                Learning Progress Summary                                         Patient     Acceptance, E, NR by MIKO at 12/4/2018 11:30 AM       Significant Other     Acceptance, E, NR by MIKO at 12/4/2018 11:30 AM                                                                      Point: Body mechanics (In Progress)                                Learning Progress Summary                                         Patient     Acceptance, E, NR by MIKO at 12/4/2018 11:30 AM       Significant Other     Acceptance, E, NR by MIKO at 12/4/2018 11:30 AM                                                                      Point: Precautions (In Progress)                                Learning Progress Summary                                         Patient     Acceptance, E, NR by MIKO at 12/4/2018 11:30 AM       Significant Other     Acceptance, E, NR  by MIKO at 12/4/2018 11:30 AM                                                                                                     User Key                           Initials     Effective Dates     Name     Provider Type     Discipline             MIKO     06/19/15 -      Margo Love, PT     Physical Therapist     PT                                          PT Recommendation and Plan    Anticipated Discharge Disposition (PT): skilled nursing facility    Planned Therapy Interventions (PT Eval): balance training, bed mobility training, gait training, home exercise program, strengthening, transfer training    Therapy Frequency (PT Clinical Impression): daily    Outcome Summary/Treatment Plan (PT)    Anticipated Discharge Disposition (PT): skilled nursing facility    Plan of Care Reviewed With: spouse, patient    Outcome Summary: PT eval completed patient is able to transfer and ambulate with walker with mod assist x2 anticipate patient will need SNF for continued PT/OT upon D/C from [facility] prior to going home with wife                  Outcome Measures                                           Row Name       12/04/18 1130       12/04/18 1129                                    How much help from another person do you currently need...             Turning from your back to your side while in flat bed without using bedrails?      4  -MIKO      --                   Moving from lying on back to sitting on the side of a flat bed without bedrails?      3  -MIKO      --                   Moving to and from a bed to a chair (including a wheelchair)?      2  -MIKO      --                   Standing up from a chair using your arms (e.g., wheelchair, bedside chair)?      2  -MIKO      --                   Climbing 3-5 steps with a railing?      1  -MIKO      --                   To walk in hospital room?      2  -MIKO      --                   AM-PAC 6 Clicks Score      14  -MIKO      --                           How much help from another is  currently needed...             Putting on and taking off regular lower body clothing?      --      2  -AC                   Bathing (including washing, rinsing, and drying)      --      2  -AC                   Toileting (which includes using toilet bed pan or urinal)      --      2  -AC                   Putting on and taking off regular upper body clothing      --      3  -AC                   Taking care of personal grooming (such as brushing teeth)      --      3  -AC                   Eating meals      --      3  -AC                   Score      --      15  -AC                           Functional Assessment             Outcome Measure Options      AM-PAC 6 Clicks Basic Mobility (PT)  -MIKO      AM-PAC 6 Clicks Daily Activity (OT)  -AC                                         User Key      (r) = Recorded By, (t) = Taken By, (c) = Cosigned By                   Initials       Name       Provider Type               Margo De La Cruz, PT     Physical Therapist             Snow Ventura, OT     Occupational Therapist                            Time Calculation:                 PT Charges                                      Row Name       12/04/18 1130                                                 Time Calculation             Start Time      1130  -MIKO                         PT Received On      12/04/18  -MIKO                         PT Goal Re-Cert Due Date      12/14/18  -MIKO                                               User Key      (r) = Recorded By, (t) = Taken By, (c) = Cosigned By                   Initials       Name       Provider Type               Margo De La Cruz, PT     Physical Therapist                                 Therapy Suggested Charges                    Code               Minutes       Charges               None                                                     Therapy Charges for Today                    Code       Description       Service Date       Service  Provider       Modifiers       Qty               [id number]     HC PT EVAL HIGH COMPLEXITY 4     2018     Margo Love, PT     GP     1                             PT G-Codes    Outcome Measure Options: AM-PAC 6 Clicks Basic Mobility (PT)    AM-PAC 6 Clicks Score: 14    Score: 15              Margo Love, PT             2018                                                                                                          Snow Feliz, OT   Occupational Therapist   Occupational Therapy          Therapy Evaluation   Signed        Date of Service:  2018  1:47 PM                            Signed                Expand All Collapse All                      Expand widget buttonCollapse widget button        Show:Clear all      ManualTemplateCopied    Added by:          Snow Feliz, OT      Zach for detailscustomization button                                                                                                                                                                                    Acute Care - Occupational Therapy Initial Evaluation    [facility]         Patient Name: Sandip Feliz                 : 1931                      MRN: [id number]    Today's Date: 2018                   Onset of Illness/Injury or Date of Surgery: 18    Date of Referral to OT: 18                  Referring Physician: PITO Bowie                   Admit Date: 2018                       ICD-10-CM     ICD-9-CM       1.     Acute UTI     N39.0     599.0       2.     Bilateral lower extremity pain     M79.604     729.5             M79.605             3.     Unable to ambulate     R26.2     719.7       4.     History of atrial fibrillation     Z86.79     V12.59       5.     History of hypertension     Z86.79     V12.59       6.     At high risk for falls     Z91.81     V15.88       7.     Acute urinary retention     R33.8     788.29                   Patient Active Problem List       Diagnosis       •     HTN (hypertension)       •     Osteoporosis       •     Arthritis of knee, left       •     S/P left unicompartmental knee replacement       •     Acute blood loss anemia, mild, asymptomatic       •     Paroxysmal atrial fibrillation (CMS/HCC)       •     Acute urinary retention       •     Hematuria       •     Urinary retention       •     S/P greenlight laser vaporization of prostate       •     Prediabetes       •     Leukocytosis, mild, likely reactive       •     Hyponatremia       •     Acute UTI (urinary tract infection)       •     Anemia       •     Generalized weakness       •     Pain in both lower extremities       •     Acute UTI            Medical History              Past Medical History:       Diagnosis     Date       •     A-fib (CMS/HCC)             •     Arthritis             •     Shaffer catheter in place             •     GERD (gastroesophageal reflux disease)             •     Hearing impairment             •     Hearing loss                   both ears hearing aides       •     Heartburn             •     Hypertension             •     Melanoma (CMS/HCC)             •     Wears dentures             •     Wears dentures                   partial bottom full at top       •     Wears glasses             •     Wears hearing aid                     Surgical History               Past Surgical History:       Procedure     Laterality     Date       •     APPENDECTOMY                   •     CATARACT EXTRACTION                   •     COLONOSCOPY                         2 years ago       •     SKIN CANCER EXCISION                         MELANOMA       •     STOMACH SURGERY                                                       OT ASSESSMENT FLOWSHEET (last 72 hours)                             Occupational Therapy Evaluation                                          Row Name       12/04/18 6928                                                                            OT Evaluation Time/Intention             Subjective Information      complains of;pain;weakness  -AC                                     Document Type      evaluation  -AC                                     Mode of Treatment      occupational therapy  -AC                                     Patient Effort      good  -AC                                             General Information             Patient Profile Reviewed?      yes  -AC                                     Onset of Illness/Injury or Date of Surgery      12/01/18  -                                     Referring Physician      PITO Bowie  -                                     Patient Observations      alert;cooperative;agree to therapy  -AC                                     Patient/Family Observations      wife present  -                                     General Observations of Patient      Pt received sitting up in bed  -AC                                     Prior Level of Function      independent:;all household mobility;feeding;grooming;min assist:;dressing;bathing  -AC                                     Equipment Currently Used at Home      bath bench;commode, bedside;walker, rolling  -AC                                     Pertinent History of Current Functional Problem      Pt admit with generalized weakness, LE pain and urinary retention  -AC                                     Existing Precautions/Restrictions      fall  -AC                                     Risks Reviewed      patient:;spouse/S.O.:;LOB;increased discomfort  -AC                                     Benefits Reviewed      patient:;spouse/S.O.:;improve function;increase independence;increase strength;increase balance;increase knowledge  -AC                                     Barriers to Rehab      previous functional deficit  -AC                                             Relationship/Environment             Primary Source of  Support/Comfort      spouse  -AC                                     Lives With      spouse  -AC                                     Concerns About Impact on Relationships      niece assists ig going for MD visit  -                                             Resource/Environmental Concerns             Current Living Arrangements      home/apartment/condo  -                                             Home Main Entrance             Number of Stairs, Main Entrance      two  -AC                                     Stair Railings, Main Entrance      none  -AC                                             Cognitive Assessment/Intervention- PT/OT             Orientation Status (Cognition)      oriented x 4  -AC                                     Follows Commands (Cognition)      follows one step commands;over 90% accuracy  -AC                                     Safety Deficit (Cognitive)      at risk behavior observed  -AC                                             Safety Issues, Functional Mobility             Safety Issues Affecting Function (Mobility)      at risk behavior observed  -AC                                     Impairments Affecting Function (Mobility)      balance;strength;postural/trunk control  -                                             Bed Mobility Assessment/Treatment             Bed Mobility Assessment/Treatment      supine-sit  -                                     Supine-Sit [place] (Bed Mobility)      contact guard  -                                     Bed Mobility, Safety Issues      decreased use of legs for bridging/pushing  -                                             Functional Mobility             Functional Mobility- Ind. Level      verbal cues required;moderate assist (50% patient effort);2 person assist required  -                                     Functional Mobility- Device      rolling walker  -                                     Functional  Mobility-Distance (Feet)      10  -AC                                     Functional Mobility- Safety Issues      step length decreased;weight-shifting ability decreased does not step close enough to walker  -AC                                             Transfer Assessment/Treatment             Transfer Assessment/Treatment      sit-stand transfer;stand-sit transfer  -AC                                             Sit-Stand Transfer             Sit-Stand [place] (Transfers)      verbal cues;moderate assist (50% patient effort);2 person assist  -                                     Assistive Device (Sit-Stand Transfers)      walker, front-wheeled  -                                             Stand-Sit Transfer             Stand-Sit [place] (Transfers)      verbal cues;moderate assist (50% patient effort);2 person assist  -AC                                     Assistive Device (Stand-Sit Transfers)      walker, front-wheeled  -AC                                             ADL Assessment/Intervention             BADL Assessment/Intervention      lower body dressing  -AC                                             Lower Body Dressing Assessment/Training             Lower Body Dressing [place] Level      doff;supervision;don;moderate assist (50% patient effort)  -AC                                     Lower Body Dressing Position      long sitting  -AC                                             BADL Safety/Performance             Impairments, BADL Safety/Performance      balance;pain;strength;trunk/postural control  -                                             General ROM             GENERAL ROM COMMENTS      BUE WFL  -AC                                             MMT (Manual Muscle Testing)             General MMT Comments      BUE grossly 4/5  -AC                                             Motor Assessment/Interventions             Additional Documentation      Balance (Group);Balance  Interventions (Group)  -AC                                             Balance             Balance      static sitting balance;dynamic standing balance;static standing balance  -AC                                             Static Sitting Balance             Level of [place] (Unsupported Sitting, Static Balance)      independent  -AC                                             Static Standing Balance             Level of [place] (Supported Standing, Static Balance)      moderate assist, 50 to 74% patient effort RW  -AC                                             Dynamic Standing Balance             Level of [place], Reaches Outside Midline (Standing, Dynamic Balance)      moderate assist, 50 to 74% patient effort x 2 with RW  -AC                                             Sensory Assessment/Intervention             Sensory General Assessment      no sensation deficits identified BUE  -                                     Additional Documentation      Vision Assessment/Intervention (Group)  -AC                                             Vision Assessment/Intervention             Visual Impairment/Limitations      corrective lenses full time  -AC                                             Positioning and Restraints             Pre-Treatment Position      in bed  -AC                                     Post Treatment Position      chair  -AC                                     In Chair      reclined;call light within reach;encouraged to call for assist;exit alarm on;with family/caregiver;RUE elevated;LUE elevated  -                                             Pain Assessment             Additional Documentation      Pain Scale: Numbers Pre/Post-Treatment (Group)  -AC                                             Pain Scale: Numbers Pre/Post-Treatment             Pain Scale: Numbers, Pretreatment      8/10  -AC                                     Pain Scale: Numbers, Post-Treatment      8/10  -AC                                      Pain Location - Side      Bilateral  -AC                                     Pain Location - Orientation      lower  -AC                                     Pain Location      extremity  -AC                                     Pain Intervention(s)      Repositioned  -AC                                             Clinical Impression (OT)             Date of Referral to OT      12/02/18  -AC                                     OT Diagnosis      ADL decline  -AC                                     Patient/Family Goals Statement (OT Eval)      Increase ADL independence  -AC                                     Criteria for Skilled Therapeutic Interventions Met (OT Eval)      yes;treatment indicated  -AC                                     Rehab Potential (OT Eval)      good, to achieve stated therapy goals  -AC                                     Therapy Frequency (OT Eval)      daily  -AC                                     Care Plan Review (OT)      evaluation/treatment results reviewed  -AC                                     Care Plan Review, Other Participant (OT Eval)      spouse  -AC                                     Anticipated Discharge Disposition (OT)      skilled nursing facility  -AC                                             Planned OT Interventions             Planned Therapy Interventions (OT Eval)      BADL retraining;functional balance retraining;occupation/activity based interventions;strengthening exercise;transfer/mobility retraining  -AC                                             OT Goals             Bed Mobility Goal Selection (OT)      bed mobility, OT goal 1  -AC                                     Transfer Goal Selection (OT)      transfer, OT goal 1  -AC                                     Dressing Goal Selection (OT)      dressing, OT goal 1  -AC                                     Toileting Goal Selection (OT)      toileting, OT goal 1  -AC                                              Bed Mobility Goal 1 (OT)             Activity/Assistive Device (Bed Mobility Goal 1, OT)      bed mobility activities, all  -AC                                     [place] Level/Cues Needed (Bed Mobility Goal 1, OT)      supervision required  -AC                                     Time Frame (Bed Mobility Goal 1, OT)      by discharge  -AC                                     Progress/Outcomes (Bed Mobility Goal 1, OT)      goal ongoing  -AC                                             Transfer Goal 1 (OT)             Activity/Assistive Device (Transfer Goal 1, OT)      bed-to-chair/chair-to-bed;toilet;walker, rolling  -AC                                     [place] Level/Cues Needed (Transfer Goal 1, OT)      minimum assist (75% or more patient effort)  -AC                                     Time Frame (Transfer Goal 1, OT)      by discharge  -AC                                     Progress/Outcome (Transfer Goal 1, OT)      goal ongoing  -AC                                             Dressing Goal 1 (OT)             Activity/Assistive Device (Dressing Goal 1, OT)      lower body dressing  -AC                                     [place]/Cues Needed (Dressing Goal 1, OT)      minimum assist (75% or more patient effort)  -AC                                     Time Frame (Dressing Goal 1, OT)      by discharge  -AC                                     Progress/Outcome (Dressing Goal 1, OT)      goal ongoing  -AC                                             Toileting Goal 1 (OT)             Activity/Device (Toileting Goal 1, OT)      adjust/manage clothing;perform perineal hygiene  -AC                                     [place] Level/Cues Needed (Toileting Goal 1, OT)      minimum assist (75% or more patient effort)  -AC                                     Time Frame (Toileting Goal 1, OT)      by discharge  -AC                                     Progress/Outcome  (Toileting Goal 1, OT)      goal ongoing  -                                             Living Environment             Home Accessibility      stairs to enter home;tub/shower is not walk in  [facility]                                                           User Key      (r) = Recorded By, (t) = Taken By, (c) = Cosigned By                   Initials       Name       Effective Dates                    Snow Feliz OT     06/23/15 -                                 Occupational Therapy Education                              Title: PT OT SLP Therapies (In Progress)                                Topic: Occupational Therapy (In Progress)                                 Point: ADL training (Done)                        Description: Instruct learner(s) on proper safety adaptation and remediation techniques during self care or transfers.   Instruct in proper use of assistive devices.                               Learning Progress Summary                                         Patient     Acceptance, E,TB, VU by  at 12/4/2018  1:38 PM             Comment:  benefits of activity, role of OT, d/c plan       Significant Other     Acceptance, E,TB, VU by  at 12/4/2018  1:38 PM             Comment:  benefits of activity, role of OT, d/c plan                                                                                                     User Key                           Initials     Effective Dates     Name     Provider Type     Discipline                  06/23/15 -      Snow Feliz, OT     Occupational Therapist     OT                                                    OT Recommendation and Plan    Outcome Summary/Treatment Plan (OT)    Anticipated Discharge Disposition (OT): skilled nursing facility    Planned Therapy Interventions (OT Eval): BADL retraining, functional balance retraining, occupation/activity based interventions, strengthening exercise, transfer/mobility retraining    Therapy Frequency (OT  Eval): daily    Plan of Care Review    Plan of Care Reviewed With: spouse, patient    Plan of Care Reviewed With: spouse, patient    Outcome Summary: OT eval complete.  Pt CGA supine to sit, independent to doff socks, mod a to don shoes,  Pt presents with pain, weakness, decreased balance and mild confusion limiting independence with self care.  OT will follow to advance pt toward increased independence with ADLs.  Recommend SNF for rehab.                        Outcome Measures                                           Row Name       12/04/18 1129                                                 How much help from another is currently needed...             Putting on and taking off regular lower body clothing?      2  -AC                         Bathing (including washing, rinsing, and drying)      2  -AC                         Toileting (which includes using toilet bed pan or urinal)      2  -AC                         Putting on and taking off regular upper body clothing      3  -AC                         Taking care of personal grooming (such as brushing teeth)      3  -AC                         Eating meals      3  -AC                         Score      15  -AC                                 Functional Assessment             Outcome Measure Options      AM-PAC 6 Clicks Daily Activity (OT)  -AC                                               User Key      (r) = Recorded By, (t) = Taken By, (c) = Cosigned By                   Initials       Name       Provider Type               AC     Snow Feliz, OT     Occupational Therapist                                Time Calculation:                 Time Calculation- OT                                      Row Name       12/04/18 1129                                                 Time Calculation- OT             OT Start Time      1129  -                         Total Timed Code Minutes- OT      0 minute(s)  -AC                         OT Received On       12/04/18  -                         OT Goal Re-Cert Due Date      12/14/18  -                                               User Key      (r) = Recorded By, (t) = Taken By, (c) = Cosigned By                   Initials       Name       Provider Type               Snow Ventura OT     Occupational Therapist                                 Therapy Suggested Charges                    Code               Minutes       Charges               None                                                     Therapy Charges for Today                    Code       Description       Service Date       Service Provider       Modifiers       Qty               [id number]     HC OT EVAL MOD COMPLEXITY 4     12/4/2018     Snow Feliz OT     GO     1                                      Snow Feliz OT                 12/4/2018

## 2018-12-04 NOTE — CONSULTS
"Consults    Referring Provider: Librado BATES    Patient Care Team:  Kelly Robison MD as PCP - General (Family Medicine)  Kelly Robison MD as PCP - Claims Attributed    Chief Complaint: Low back and bilateral lower extremity pain and urinary retention    Subjective .     History of present illness:       Patient is an 87-year-old male who has a very brief past medical history.  Patient has been very healthy up until January of this year.  In January patient started noticing he is having more pain with his low back and legs whenever he would walk for distance and it has decreases activity for approximately 11 months.  About 11 months who is living completely independent and ambulating freely with assistance.  Patient is an avid traveler with his wife who is at bedside to help with history.  Patient is been treated with Dr. Mccallum with lumbar epidural steroid injections.  Was last seen by Dr. Mccallum in October of this year.    In early October patient was having an acute exacerbation of low back pain when he started retaining urine.  Patient underwent a procedure with Dr. Dunlap in October and an indwelling catheter was left in place for 8 weeks and then he was trialed off of the Shaffer patient was unable to empty his bladder.     Patient's urinary retention got exquisitely worse on Friday that was associated with new low back and leg pain.  Patient was ambulating with a walker up until Friday.  Patient states his pain is worse whenever he lays flat and feels significantly better when he is sitting upright especially leaning forward.  Patient is set the last few nights upright in a chair slumped forward.  She also states that whenever he stands up to walk his pain is drastically worse.  Patient is comfortable and can \"live with the pain\" when he is sitting.     Since admission patient's symptoms have worsened to where he is much more comfortable but still having issues when trying to lay flat.  The MRI was " impossible at first due to the patient's pain when lying flat, but he got an MRI of the lumbar spine that shows quite a bit of motion artifact but does reveal some lumbar stenosis and lateral recess stenosis at L5-S1 and L4 5 on the left.       Review of Systems   Constitutional: Negative for activity change, appetite change, chills, fatigue and fever.   HENT: Negative for congestion, dental problem, ear pain, hearing loss, sinus pressure and tinnitus.    Eyes: Negative for pain and redness.   Respiratory: Negative for apnea, cough, shortness of breath and wheezing.    Cardiovascular: Negative for chest pain, palpitations and leg swelling.   Gastrointestinal: Negative for abdominal distention, abdominal pain, blood in stool, constipation, diarrhea, nausea and vomiting.   Endocrine: Negative for cold intolerance, heat intolerance and polyuria.   Genitourinary: Positive for difficulty urinating, dysuria and frequency. Negative for enuresis and urgency.   Musculoskeletal: Positive for back pain.   Skin: Negative for color change and rash.   Neurological: Negative for dizziness, tremors, seizures, syncope, speech difficulty, weakness, light-headedness, numbness and headaches.   Psychiatric/Behavioral: Negative for behavioral problems and confusion. The patient is not nervous/anxious.        History  Past Medical History:   Diagnosis Date   • A-fib (CMS/HCC)    • Arthritis    • Shaffer catheter in place    • GERD (gastroesophageal reflux disease)    • Hearing impairment    • Hearing loss     both ears hearing aides   • Heartburn    • Hypertension    • Melanoma (CMS/HCC)    • Wears dentures    • Wears dentures     partial bottom full at top   • Wears glasses    • Wears hearing aid    ,   Past Surgical History:   Procedure Laterality Date   • APPENDECTOMY     • CATARACT EXTRACTION     • COLONOSCOPY      2 years ago   • SKIN CANCER EXCISION      MELANOMA   • STOMACH SURGERY     ,   Family History   Problem Relation Age of  Onset   • Cancer Mother    • Stroke Father    • Prostate cancer Brother    • Prostate cancer Brother    • Prostate cancer Brother    ,   Social History     Tobacco Use   • Smoking status: Former Smoker     Types: Pipe     Last attempt to quit: 1970     Years since quittin.9   • Smokeless tobacco: Never Used   • Tobacco comment: quit over 40 years ago    Substance Use Topics   • Alcohol use: Yes     Comment: 1-2 drinks a night   • Drug use: No   ,   Medications Prior to Admission   Medication Sig Dispense Refill Last Dose   • acetaminophen (TYLENOL) 500 MG tablet Take 1,000 mg by mouth Every 6 (Six) Hours As Needed for Mild Pain .   10/30/2018 at 0500   • amLODIPine-benazepril (LOTREL 5-20) 5-20 MG per capsule Take 1 capsule by mouth Daily.   10/30/2018 at 0500   • cefdinir (OMNICEF) 300 MG capsule Take 1 capsule by mouth 2 (Two) Times a Day. 10 capsule 0 10/30/2018 at 0500   • Cholecalciferol (VITAMIN D3) 2000 units tablet Take  by mouth Daily.   10/29/2018 at 0800   • denosumab (PROLIA) 60 MG/ML solution syringe Inject 60 mg under the skin every 6 (six) months.   10/23/2018   • docusate sodium 100 MG capsule Take 1 capsule by mouth 2 (Two) Times a Day. 20 each 0    • finasteride (PROSCAR) 5 MG tablet Take 1 tablet by mouth Daily. 30 tablet 0 10/29/2018 at 1200   • HYDROcodone-acetaminophen (NORCO) 5-325 MG per tablet Take 1 tablet by mouth Every 6 (Six) Hours As Needed 30 tablet 0    • Multiple Vitamins-Minerals (MULTIVITAMIN MEN 50+ PO) Take  by mouth Daily.   10/29/2018 at 0800   • nystatin (MYCOSTATIN) 034370 UNIT/GM ointment Apply  topically to the appropriate area as directed 2 (Two) Times a Day. Treat for seven days 30 g 0 10/29/2018   • raNITIdine (ZANTAC) 300 MG tablet Take 300 mg by mouth As Needed for Indigestion or Heartburn.   Past Month at Unknown time   • rivaroxaban (XARELTO) 10 MG tablet Take 1 tablet by mouth Daily. Resume after follow-up with Dr. Miracle Bustillos 30 tablet     • saccharomyces  boulardii (FLORASTOR) 250 MG capsule Take 1 capsule by mouth 2 (Two) Times a Day. (Patient taking differently: Take 250 mg by mouth As Needed.) 30 capsule 0 Past Week at Unknown time   • tamsulosin (FLOMAX) 0.4 MG capsule 24 hr capsule Take 1 capsule by mouth Daily. 30 capsule 0 10/29/2018 at 0800   • triamterene-hydrochlorothiazide (MAXZIDE-25) 37.5-25 MG per tablet Take 1 tablet by mouth Daily.   10/30/2018 at 0500   • vitamin B-12 (CYANOCOBALAMIN) 1000 MCG tablet Take 1,000 mcg by mouth Daily.   10/29/2018 at 0800   , Scheduled Meds:    amLODIPine-lisinopril 5-20 mg combo dose  Oral Q24H   ceftriaxone 1 g Intravenous Q24H   dexamethasone 4 mg Intravenous Q8H   docusate sodium 100 mg Oral BID   finasteride 5 mg Oral Daily   gabapentin 100 mg Oral Q8H   saccharomyces boulardii 250 mg Oral BID   sodium chloride 3 mL Intravenous Q12H   thiamine 100 mg Oral Daily   vitamin B-12 1,000 mcg Oral Daily   , Continuous Infusions:   , PRN Meds:  •  acetaminophen  •  HYDROcodone-acetaminophen  •  potassium chloride **OR** potassium chloride **OR** potassium chloride  •  [COMPLETED] Insert peripheral IV **AND** sodium chloride  •  sodium chloride and Allergies:  Patient has no known allergies.   SMOKING STATUS: Nonsmoker  Objective     Vital Signs   Temp:  [97.8 °F (36.6 °C)-100.2 °F (37.9 °C)] 98.1 °F (36.7 °C)  Heart Rate:  [83-87] 83  Resp:  [18] 18  BP: (127-164)/(67-87) 128/74  Body mass index is 23.3 kg/m².    Physical Exam:   GENERAL:   The patient is in no acute distress, and is able to answer all questions appropriately.  Neck: Supple without lymphadenopathy  Musculoskeletal:     strength is 4 out of 5 bilaterally.   Shoulder abduction is 4 out of 5.    Dorsiflexion is 3-4/5 right and 5 out of 5 on left.  Plantarflexion is strong and equal bilaterally  Hip Flexion 5/5 bilaterally.    The patient’s gait not tested secondary to pain  Neurologic:   The patient is alert and oriented by 3.     Pupils are equal and  reactive to light.    Visual fields are full.    Extraocular movements are intact without nystagmus.    There is no evidence of central motor drift. No facial droop.  No difficulty with rapid alternating movements.    Sensation is equal bilaterally with no deficit.      Reflexes:  1+ @ biceps, triceps, brachioradialis, as well as the patellar and Achilles tendon bilaterally.  No Felicia's, clonus, or long track signs noted    Results Review:   I reviewed the patient's new imaging results and agree with the interpretation.  Discussed with Dr. Reyna.  MRI of the lumbar spine shows severe left-sided L5-S1 foraminal stenosis.  Patient also has a facet cyst with lateral recess stenosis on left L4 5.     EMG nerve conduction studies show subacute/chronic L5-S1 radiculopathy bilaterally.    Peroneal nerve is normal which is all in considering patient has right-sided foot drop and the majority of the patient's lumbar MRI has pathology on the left.    Assessment/Plan       Generalized weakness    HTN (hypertension)    Paroxysmal atrial fibrillation (CMS/HCC)    Urinary retention    Prediabetes    Hyponatremia    Acute UTI (urinary tract infection)    Anemia    Pain in both lower extremities    Acute UTI    Patient is been worked up thoroughly by Dr. Mccallum and has had procedures performed by Dr. Mccallum.  Bending is reasonable to ask Dr. Mccallum to revisit the patient in the hospital.    Dr. Reyna came in and assessed the patient and patient was resting quietly with little to no complaints of pain.  From our standpoint there is no indication for urgent surgery at this time.  We've contacted Dr. Mccallum to see if he would like to comment on the patient's care.    Patient can follow-up on an outpatient basis after the urinary retention issue has been dealt with.    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  Surgery with an active infection is not likely, especially in absence of ongoing  symptomatology.      Please do not hesitate to call with any other questions or concerns.    I discussed the patients findings and my recommendations with patient, family and consulting provider    Ephraim Aguero PA-C  12/04/18  1:25 PM    Time: 60 minutes

## 2018-12-04 NOTE — PLAN OF CARE
Problem: Fall Risk (Adult)  Goal: Identify Related Risk Factors and Signs and Symptoms  Outcome: Outcome(s) achieved Date Met: 12/04/18 12/03/18 0154   Fall Risk (Adult)   Related Risk Factors (Fall Risk) fatigue/slow reaction;fear of falling;gait/mobility problems;sensory deficits;neuro disease/injury;sleep pattern alteration;environment unfamiliar   Signs and Symptoms (Fall Risk) presence of risk factors       Problem: Skin Injury Risk (Adult)  Goal: Identify Related Risk Factors and Signs and Symptoms  Outcome: Ongoing (interventions implemented as appropriate)      Problem: Activity Intolerance (Adult)  Goal: Identify Related Risk Factors and Signs and Symptoms  Outcome: Outcome(s) achieved Date Met: 12/04/18

## 2018-12-04 NOTE — PROGRESS NOTES
Continued Stay Note   [place]     Patient Name: Sandip Feliz  MRN: [id number]  Today's Date: 12/4/2018    Admit Date: 12/1/2018    Discharge Plan     Row Name 12/04/18 1044       Plan    Plan  Plan is currently pending PT and OT eval to determine need for rehab    Plan Comments  Met with spouse at bedside.  Patient in MRI.  Spouse concerned that PT hasn't seen him for therapy yet.  She noted 2 unsuccesful attempts while patient receiving bedside care. States she feels that since he is so old, no one cares about him getting better.  Reassured spouse that that isn't the case and CM with try to determine the delay in PT.  Spouse in agreement with plan.      Final Discharge Disposition Code  30 - still a patient        Discharge Codes    No documentation.             Narda Aleman RN

## 2018-12-04 NOTE — PLAN OF CARE
Problem: Patient Care Overview  Goal: Plan of Care Review  Outcome: Ongoing (interventions implemented as appropriate)   12/04/18 1129   Coping/Psychosocial   Plan of Care Reviewed With spouse;patient   Plan of Care Review   Progress (Evaluation)   OTHER   Outcome Summary OT eval complete. Pt CGA supine to sit, independent to doff socks, mod a to don shoes, Pt presents with pain, weakness, decreased balance and mild confusion limiting independence with self care. OT will follow to advance pt toward increased independence with ADLs. Recommend SNF for rehab.

## 2018-12-04 NOTE — PROGRESS NOTES
[facility] Medicine Services  PROGRESS NOTE    Patient Name: Sandip Feliz  : 1931  MRN: [id number]    Date of Admission: 2018  Length of Stay: 2  Primary Care Physician: Kelly Robison MD    Subjective   Subjective     CC:  Weakness, leg pain    HPI:  No new issues overnight, per wife, pt slept well last pm.  Pt's hearing aids are currently out of his ears, so communication limited.    Review of Systems  Gen- No fevers, chills  CV- No chest pain, palpitations  Resp- No cough, dyspnea  GI- No N/V/D, abd pain  Otherwise ROS is negative except as mentioned in the HPI.    Objective   Objective     Vital Signs:   Temp:  [97.8 °F (36.6 °C)-100.2 °F (37.9 °C)] 98.5 °F (36.9 °C)  Heart Rate:  [84-87] 86  Resp:  [18] 18  BP: (127-164)/(67-87) 127/67        Physical Exam:  Constitutional: No acute distress, awake, alert, [place]  HENT: NCAT, mucous membranes moist  Respiratory: Clear to auscultation bilaterally, respiratory effort normal   Cardiovascular: RRR, no murmurs, rubs, or gallops, palpable pedal pulses bilaterally  Gastrointestinal: Positive bowel sounds, soft, nontender, nondistended  Musculoskeletal: No bilateral ankle edema  Psychiatric: Appropriate affect, cooperative  Neurologic: Oriented x 3, Cranial Nerves grossly intact to confrontation, speech clear  Skin: No rashes    Results Reviewed:  I have personally reviewed current lab, radiology, and data and agree.    Results from last 7 days   Lab Units  18   0648  18   0521  18   0721   WBC 10*3/mm3  6.85  8.45  8.19   HEMOGLOBIN g/dL  10.4*  10.2*  10.3*   HEMATOCRIT %  32.6*  33.1*  33.6*   PLATELETS 10*3/mm3  317  363  377     Results from last 7 days   Lab Units  18   0648  18   0522  18   0721  18   1405   SODIUM mmol/L  133  133  133  126*   POTASSIUM mmol/L  3.9  3.5  4.0  3.8   CHLORIDE mmol/L  105  102  101  97*   CO2 mmol/L  20.0  21.0  23.0  22.0   BUN mg/dL  8*  9   14  17   CREATININE mg/dL  0.57*  0.65  0.75  0.89   GLUCOSE mg/dL  111*  115*  112*  116*   CALCIUM mg/dL  8.2*  8.3*  8.6*  8.9   ALT (SGPT) U/L   --    --    --   17   AST (SGOT) U/L   --    --    --   23     Estimated Creatinine Clearance: 58.4 mL/min (A) (by C-G formula based on SCr of 0.57 mg/dL (L)).  BNP   Date Value Ref Range Status   12/01/2018 39.0 0.0 - 100.0 pg/mL Final     Comment:     Results may be falsely decreased if patient taking Biotin.       Microbiology Results Abnormal     Procedure Component Value - Date/Time    Urine Culture - Urine, Urine, Clean Catch [[id number]]  (Abnormal)  (Susceptibility) Collected:  12/01/18 1402    Lab Status:  Final result Specimen:  Urine, Clean Catch Updated:  12/03/18 1141     Urine Culture 20,000-30,000 CFU/mL Escherichia coli      20,000-30,000 CFU/mL Normal Urogenital Hailey    Susceptibility      Escherichia coli     QUIN     Ampicillin Resistant     Ampicillin + Sulbactam Resistant     Aztreonam Susceptible     Cefepime Susceptible     Cefotaxime Susceptible     Ceftriaxone Susceptible     Cefuroxime sodium Susceptible     Cephalothin Intermediate     Ciprofloxacin Resistant     Ertapenem Susceptible     Gentamicin Susceptible     Levofloxacin Resistant     Meropenem Susceptible     Nitrofurantoin Susceptible     Piperacillin + Tazobactam Susceptible     Tetracycline Susceptible     Tobramycin Susceptible     Trimethoprim + Sulfamethoxazole Susceptible                          Imaging Results (last 24 hours)     Procedure Component Value Units Date/Time    MRI Lumbar Spine Without Contrast [[id number]] Collected:  12/04/18 1035     Updated:  12/04/18 1035    Narrative:       EXAMINATION: MRI LUMBAR SPINE WO CONTRAST- 12/04/2018     INDICATION: N39.0-Urinary tract infection, site not specified;  M79.604-Pain in right leg; M79.605-Pain in left leg; R26.2-Difficulty in  walking, not elsewhere classified; Z86.79-Personal history of other  diseases of the  circulatory system; Z86.79-Personal history of other  diseases of the circulatory system; Z91.81-History of falling;  R33.8-Other         TECHNIQUE: Sagittal T1-T2 STIR and axial T2-weighted images of the  lumbar spine.     COMPARISON: NONE     FINDINGS: Patient indicates lower back pain radiating to right leg,  uncomfortable standing, lying flat.     Images are significantly motion degraded throughout the study. There is  straightening of the normal lumbar lordosis. No compression deformity or  significant focal subluxation is seen. There is multilevel canal  narrowing due to disc protrusions and posterior element hypertrophy.  There appears to be only mild edema associated with degenerative disc  changes, particularly at L5-S1, and no evidence of underlying pathologic  lesion.     Axial images show canal borderline stenotic at T12-L1.     At L1-2, canal appears normal diameter. Foramina appear lower limits of  normal.     At L2-3, there is mild asymmetric canal narrowing due to facet  osteophytes and irregular disc bulge. Foramina appear normal on the  right and mildly narrowed on the left.     At L3-4, there is mild to moderate canal stenosis, with large facet  osteophyte and disc protrusion causing significant narrowing of the left  one third of bony canal, and causing thecal sac to appear deviated to  the right. Right neural foramen is at least moderately narrowed. Left  neural foramen appears obliterated by bony hypertrophic change.     At L4-5, there is moderate canal stenosis due to posterior element  hypertrophy and disc protrusion. There is again high-grade left-sided  foraminal stenosis and there appears to be only mild right-sided  foraminal stenosis.     At L5-S1, there is a central disc protrusion and osteophyte, with what  is thought to be significant narrowing of the left one half of the canal  and milder narrowing of the right one half of the canal. There appears  to be left lateral recess stenosis  as well whether due to disc  protrusion or facet osteophyte. Left neural foramen is markedly  narrowed. Right neural foramen appears practically completely effaced.       Impression:       1. Heavily motion degraded exam. No evidence of acute trauma or  significant focal subluxation.  2. High-grade multilevel foraminal stenosis, including moderate right  and marked to severe left-sided foraminal stenosis at L3-4, marked  left-sided foraminal stenosis at L4-5, marked bilateral foraminal  stenosis at L5-S1.  3. Asymmetric mild to moderate canal stenosis at L3-4, moderate canal  stenosis at L4-5 and milder L2-3 canal stenosis. Bony hypertrophic  change causes irregular canal narrowing. Please see above complete  description by disc level.     D:  12/04/2018  E:  12/04/2018           Results for orders placed during the hospital encounter of 12/01/18   Adult Transthoracic Echo Complete W/ Cont if Necessary Per Protocol    Narrative · Left ventricular systolic function is normal.  · Estimated EF appears to be in the range of 56 - 60%  · Left ventricular diastolic dysfunction (grade I) consistent with   impaired relaxation.  · Trace to mild tricuspid valve regurgitation is present. Estimated right   ventricular systolic pressure from tricuspid regurgitation is normal (<35   mmHg)          I have reviewed the medications.      amLODIPine-lisinopril 5-20 mg combo dose  Oral Q24H   ceftriaxone 1 g Intravenous Q24H   dexamethasone 4 mg Intravenous Q8H   docusate sodium 100 mg Oral BID   finasteride 5 mg Oral Daily   gabapentin 100 mg Oral Q8H   saccharomyces boulardii 250 mg Oral BID   sodium chloride 3 mL Intravenous Q12H   thiamine 100 mg Oral Daily   vitamin B-12 1,000 mcg Oral Daily         Assessment/Plan   Assessment / Plan     Active Hospital Problems    Diagnosis   • **Generalized weakness   • Acute UTI   • Acute UTI (urinary tract infection)   • Anemia   • Pain in both lower extremities   • Hyponatremia   • Urinary  "retention   • Prediabetes   • Paroxysmal atrial fibrillation (CMS/[facility])   • HTN (hypertension)          Brief Hospital Course to date:  Sandip Feliz is a 87 y.o. male with PMH of HTN, BPH, chronic pain, GERD, Afib on chronic Xarelto but has been on hold due to hematuria, and recent admission in October for urinary retention s/p reduction of paraphimosis and cystoscopy with clot evacuation by Dr. Rausch with removal of Shaffer approximately three weeks ago who presents with complaints of generalized weakness and acute urinary retention.        Assessment & Plan:  --L5-S1 Radiculopathy- subacute/chronic per EMG/NCS 12/3 with acute inability to walk and urinary retention.  Neurology started Gabapentin and have now signed off.  Will obtain MRI L spine to further evaluate, started IV Decadron to alleviate pain and hopefully aid in pt ability to lay flat for MRI.  Consult NSGY pending MRI results.   --UTI- UCx only growing 20-30K E coli (susceptible to Rocephin), however, given his recent history, will treat as UTI for time being. Continue IV Rocephin   --Hyponatremia- holding HCTZ, also could be chronic from EtOH use (drinks \"one drink of vodka\" daily). Better today  --Urinary retention- s/p Shaffer placement, consulted Urology, appreciate their input  --PAF- continue rate control, Xarelto on hold per Urology's recs last admission.   --HTN- continue home meds    DVT Prophylaxis:  mechanical    CODE STATUS:   Code Status and Medical Interventions:   Ordered at: 12/01/18 2021     Level Of Support Discussed With:    Patient     Code Status:    CPR     Medical Interventions (Level of Support Prior to Arrest):    Full       Disposition: I expect the patient to be discharged TBD      Electronically signed by Flavia Pavon MD, 12/04/18, 11:16 AM.    "

## 2018-12-04 NOTE — THERAPY EVALUATION
Acute Care - Physical Therapy Initial Evaluation  [facility]     Patient Name: Sandip Feliz  : 1931  MRN: [id number]  Today's Date: 2018   Onset of Illness/Injury or Date of Surgery: 18  Date of Referral to PT: 18  Referring Physician: PITO Bowie      Admit Date: 2018    Visit Dx:     ICD-10-CM ICD-9-CM   1. Acute UTI N39.0 599.0   2. Bilateral lower extremity pain M79.604 729.5    M79.605    3. Unable to ambulate R26.2 719.7   4. History of atrial fibrillation Z86.79 V12.59   5. History of hypertension Z86.79 V12.59   6. At high risk for falls Z91.81 V15.88   7. Acute urinary retention R33.8 788.29   8. Impaired functional mobility, balance, gait, and endurance Z74.09 V49.89     Patient Active Problem List   Diagnosis   • HTN (hypertension)   • Osteoporosis   • Arthritis of knee, left   • S/P left unicompartmental knee replacement   • Acute blood loss anemia, mild, asymptomatic   • Paroxysmal atrial fibrillation (CMS/HCC)   • Acute urinary retention   • Hematuria   • Urinary retention   • S/P greenlight laser vaporization of prostate   • Prediabetes   • Leukocytosis, mild, likely reactive   • Hyponatremia   • Acute UTI (urinary tract infection)   • Anemia   • Generalized weakness   • Pain in both lower extremities   • Acute UTI     Past Medical History:   Diagnosis Date   • A-fib (CMS/HCC)    • Arthritis    • Shaffer catheter in place    • GERD (gastroesophageal reflux disease)    • Hearing impairment    • Hearing loss     both ears hearing aides   • Heartburn    • Hypertension    • Melanoma (CMS/HCC)    • Wears dentures    • Wears dentures     partial bottom full at top   • Wears glasses    • Wears hearing aid      Past Surgical History:   Procedure Laterality Date   • APPENDECTOMY     • CATARACT EXTRACTION     • COLONOSCOPY      2 years ago   • SKIN CANCER EXCISION      MELANOMA   • STOMACH SURGERY          PT ASSESSMENT (last 12 hours)      Physical Therapy Evaluation      Row Name 12/04/18 1130          PT Evaluation Time/Intention    Subjective Information  complains of;pain;weakness  -MIKO     Document Type  evaluation  -MIKO     Mode of Treatment  physical therapy  -MIKO     Patient Effort  good  -MIKO     Row Name 12/04/18 1130          General Information    Patient Profile Reviewed?  yes  -MIKO     Onset of Illness/Injury or Date of Surgery  12/01/18  -MIKO     Referring Physician  PITO Bowie  -MIKO     Patient Observations  alert;cooperative;agree to therapy  -MIKO     Patient/Family Observations  wife present and supportive  -MIKO     Prior Level of Function  independent:;gait;transfer;bed mobility;ADL's  -MIKO     Equipment Currently Used at Home  bath bench;commode, bedside;walker, rolling  -MIKO     Pertinent History of Current Functional Problem  patient went to the ED with LE pain with inability to ambulate. he also had urinary retension and was found to have UTI  -MIKO     Existing Precautions/Restrictions  fall  -MIKO     Risks Reviewed  patient:;spouse/S.O.:;LOB;increased discomfort  -MIKO     Benefits Reviewed  patient:;spouse/S.O.:;increase strength;increase balance;decrease risk of DVT  -MIKO     Barriers to Rehab  previous functional deficit  -MIKO     Row Name 12/04/18 1130          Relationship/Environment    Primary Source of Support/Comfort  spouse  -MIKO     Lives With  spouse  -MIKO     Row Name 12/04/18 1130          Resource/Environmental Concerns    Current Living Arrangements  home/apartment/condo  -MIKO     Resource/Environmental Concerns  none  -MIKO     Row Name 12/04/18 1130          Home Main Entrance    Number of Stairs, Main Entrance  two  -MIKO     Row Name 12/04/18 1130          Cognitive Assessment/Intervention- PT/OT    Orientation Status (Cognition)  oriented x 4  -MIKO     Follows Commands (Cognition)  follows one step commands;over 90% accuracy  -MIKO     Safety Deficit (Cognitive)  awareness of need for assistance;safety precautions awareness;safety precautions  follow-through/compliance  -MIKO     Row Name 12/04/18 1130          Safety Issues, Functional Mobility    Safety Issues Affecting Function (Mobility)  awareness of need for assistance;insight into deficits/self awareness;safety precaution awareness;safety precautions follow-through/compliance  -     Impairments Affecting Function (Mobility)  balance;endurance/activity tolerance;pain;strength;postural/trunk control  -MIKO     Row Name 12/04/18 1130          Bed Mobility Assessment/Treatment    Bed Mobility Assessment/Treatment  supine-sit;scooting/bridging;rolling left;rolling right  -MIKO     Rolling Left [place] (Bed Mobility)  contact guard  -MIKO     Rolling Right [place] (Bed Mobility)  contact guard  -MIKO     Scooting/Bridging [place] (Bed Mobility)  minimum assist (75% patient effort)  -MIKO     Supine-Sit [place] (Bed Mobility)  contact guard  -MIKO     Bed Mobility, Safety Issues  decreased use of arms for pushing/pulling;decreased use of legs for bridging/pushing  -MIKO     Row Name 12/04/18 1130          Transfer Assessment/Treatment    Transfer Assessment/Treatment  bed-chair transfer;sit-stand transfer;stand-sit transfer  -     Bed-Chair [place] (Transfers)  moderate assist (50% patient effort);2 person assist  -     Assistive Device (Bed-Chair Transfers)  walker, front-wheeled  -     Sit-Stand [place] (Transfers)  moderate assist (50% patient effort);2 person assist  -MIKO     Stand-Sit [place] (Transfers)  moderate assist (50% patient effort);2 person assist  -MIKO     Row Name 12/04/18 1130          Sit-Stand Transfer    Assistive Device (Sit-Stand Transfers)  walker, front-wheeled  -MIKO     Row Name 12/04/18 1130          Stand-Sit Transfer    Assistive Device (Stand-Sit Transfers)  walker, front-wheeled  -MIKO     Row Name 12/04/18 1130          Gait/Stairs Assessment/Training    Gait/Stairs Assessment/Training  gait/ambulation independence  -     [place] Level (Gait)   moderate assist (50% patient effort);2 person assist  -MIKO     Assistive Device (Gait)  walker, front-wheeled  -MIKO     Distance in Feet (Gait)  10  -MIKO     Pattern (Gait)  step-to  -MIKO     Deviations/Abnormal Patterns (Gait)  stride length decreased;base of support, narrow;scissoring  -MIKO     Comment (Gait/Stairs)  patient has narrow base of support and keeps walker out to far from his body he has hip and trunk flexion during ambulation chair brought behind patient for safety he fatigues easily  -MIKO     Row Name 12/04/18 1130          General ROM    GENERAL ROM COMMENTS  limited with trunk extension no limitations in LE's   -MIKO     Row Name 12/04/18 1130          MMT (Manual Muscle Testing)    General MMT Comments  generalized wealness 4/5  -     Row Name 12/04/18 1130          Motor Assessment/Intervention    Additional Documentation  Therapeutic Exercise (Group);Therapeutic Exercise Interventions (Group)  -     Row Name 12/04/18 1130          Therapeutic Exercise    Therapeutic Exercise  seated, lower extremities  -     Additional Documentation  Therapeutic Exercise (Row)  -     Row Name 12/04/18 1130          Lower Extremity Seated Therapeutic Exercise    Performed, Seated Lower Extremity (Therapeutic Exercise)  hip flexion/extension;knee flexion/extension;ankle dorsiflexion/plantarflexion  -     Exercise Type, Seated Lower Extremity (Therapeutic Exercise)  AROM (active range of motion)  -MIKO     Sets/Reps Detail, Seated Lower Extremity (Therapeutic Exercise)  1/10  -     Row Name 12/04/18 1130          Static Sitting Balance    Level of [place] (Unsupported Sitting, Static Balance)  independent  -     Row Name 12/04/18 1130          Static Standing Balance    Level of [place] (Supported Standing, Static Balance)  moderate assist, 50 to 74% patient effort  -     Row Name 12/04/18 1130          Dynamic Standing Balance    Level of [place], Reaches Outside Midline (Standing, Dynamic  Balance)  moderate assist, 50 to 74% patient effort  -MIKO     Row Name 12/04/18 1130          Pain Scale: Numbers Pre/Post-Treatment    Pain Scale: Numbers, Pretreatment  8/10  -MIKO     Pain Scale: Numbers, Post-Treatment  8/10  -MIKO     Pain Location - Side  Bilateral  -MIKO     Pain Location - Orientation  lower  -MIKO     Pain Location  extremity  -MIKO     Pain Intervention(s)  Repositioned;Ambulation/increased activity;Elevated  -MIKO     Row Name 12/04/18 1130          Coping    Observed Emotional State  calm;cooperative  -MIKO     Verbalized Emotional State  acceptance  -MIKO     Row Name 12/04/18 1130          Plan of Care Review    Plan of Care Reviewed With  spouse;patient  -MIKO     Row Name 12/04/18 1130          Physical Therapy Clinical Impression    Date of Referral to PT  12/04/18  -MIKO     PT Diagnosis (PT Clinical Impression)  impaired bed mobility transfer and gait decreased strength and balance  -     Patient/Family Goals Statement (PT Clinical Impression)  patient to go home with wife when he can ambulat  -MIKO     Criteria for Skilled Interventions Met (PT Clinical Impression)  yes;treatment indicated  -MIKO     Rehab Potential (PT Clinical Summary)  good, to achieve stated therapy goals  -MIKO     Care Plan Review (PT)  evaluation/treatment results reviewed;care plan/treatment goals reviewed;risks/benefits reviewed;patient/other agree to care plan  -     Row Name 12/04/18 1130          Physical Therapy Goals    Bed Mobility Goal Selection (PT)  bed mobility, PT goal 1  -MIKO     Transfer Goal Selection (PT)  transfer, PT goal 1  -MIKO     Gait Training Goal Selection (PT)  gait training, PT goal 1  -     Row Name 12/04/18 1130          Bed Mobility Goal 1 (PT)    Activity/Assistive Device (Bed Mobility Goal 1, PT)  sit to supine/supine to sit  -MIKO     [place] Level/Cues Needed (Bed Mobility Goal 1, PT)  independent  -MIKO     Time Frame (Bed Mobility Goal 1, PT)  long term goal (LTG);10 days  -MIKO      Progress/Outcomes (Bed Mobility Goal 1, PT)  goal ongoing  -MIKO     Row Name 12/04/18 1130          Transfer Goal 1 (PT)    Activity/Assistive Device (Transfer Goal 1, PT)  sit-to-stand/stand-to-sit  -MIKO     [place] Level/Cues Needed (Transfer Goal 1, PT)  independent  -MIKO     Time Frame (Transfer Goal 1, PT)  long term goal (LTG);10 days  -MIKO     Progress/Outcome (Transfer Goal 1, PT)  goal ongoing  -MIKO     Row Name 12/04/18 1130          Gait Training Goal 1 (PT)    Activity/Assistive Device (Gait Training Goal 1, PT)  gait (walking locomotion)  -MIKO     [place] Level (Gait Training Goal 1, PT)  contact guard assist  -MIKO     Distance (Gait Goal 1, PT)  150  -MIKO     Time Frame (Gait Training Goal 1, PT)  long term goal (LTG);10 days  -MIKO     Progress/Outcome (Gait Training Goal 1, PT)  goal ongoing  -MIKO     Row Name 12/04/18 1130          Patient Education Goal (PT)    Activity (Patient Education Goal, PT)  HEP  -MIKO     [place]/Cues/Accuracy (Memory Goal 2, PT)  verbalizes understanding  -MIKO     Time Frame (Patient Education Goal, PT)  long term goal (LTG);10 days  -MIKO     Progress/Outcome (Patient Education Goal, PT)  goal ongoing  -MIKO     Row Name 12/04/18 1130          Positioning and Restraints    Pre-Treatment Position  in bed  -MIKO     Post Treatment Position  chair  -MIKO     In Chair  notified nsg;reclined;sitting;call light within reach;encouraged to call for assist;exit alarm on;on mechanical lift sling;waffle cushion  -MIKO     Row Name 12/04/18 1130          Living Environment    Home Accessibility  stairs to enter home  -MIKO       User Key  (r) = Recorded By, (t) = Taken By, (c) = Cosigned By    Initials Name Provider Type    Margo De La Cruz, PT Physical Therapist        Physical Therapy Education     Title: PT OT SLP Therapies (In Progress)     Topic: Physical Therapy (In Progress)     Point: Mobility training (In Progress)     Learning Progress Summary           Patient Acceptance, E, NR  by MIKO at 12/4/2018 11:30 AM   Significant Other Acceptance, E, NR by MIKO at 12/4/2018 11:30 AM                   Point: Home exercise program (In Progress)     Learning Progress Summary           Patient Acceptance, E, NR by MIKO at 12/4/2018 11:30 AM   Significant Other Acceptance, E, NR by MIKO at 12/4/2018 11:30 AM                   Point: Body mechanics (In Progress)     Learning Progress Summary           Patient Acceptance, E, NR by MIKO at 12/4/2018 11:30 AM   Significant Other Acceptance, E, NR by MIKO at 12/4/2018 11:30 AM                   Point: Precautions (In Progress)     Learning Progress Summary           Patient Acceptance, E, NR by MIKO at 12/4/2018 11:30 AM   Significant Other Acceptance, E, NR by MIKO at 12/4/2018 11:30 AM                               User Key     Initials Effective Dates Name Provider Type Discipline    MIKO 06/19/15 -  Margo Love, PT Physical Therapist PT              PT Recommendation and Plan  Anticipated Discharge Disposition (PT): skilled nursing facility  Planned Therapy Interventions (PT Eval): balance training, bed mobility training, gait training, home exercise program, strengthening, transfer training  Therapy Frequency (PT Clinical Impression): daily  Outcome Summary/Treatment Plan (PT)  Anticipated Discharge Disposition (PT): skilled nursing facility  Plan of Care Reviewed With: spouse, patient  Outcome Summary: PT eval completed patient is able to transfer and ambulate with walker with mod assist x2 anticipate patient will need SNF for continued PT/OT upon D/C from [facility] prior to going home with wife  Outcome Measures     Row Name 12/04/18 1130 12/04/18 1129          How much help from another person do you currently need...    Turning from your back to your side while in flat bed without using bedrails?  4  -MIKO  --     Moving from lying on back to sitting on the side of a flat bed without bedrails?  3  -MIKO  --     Moving to and from a bed to a chair (including a  wheelchair)?  2  -MIKO  --     Standing up from a chair using your arms (e.g., wheelchair, bedside chair)?  2  -MIKO  --     Climbing 3-5 steps with a railing?  1  -MIKO  --     To walk in hospital room?  2  -MIKO  --     AM-PAC 6 Clicks Score  14  -MIKO  --        How much help from another is currently needed...    Putting on and taking off regular lower body clothing?  --  2  -AC     Bathing (including washing, rinsing, and drying)  --  2  -AC     Toileting (which includes using toilet bed pan or urinal)  --  2  -AC     Putting on and taking off regular upper body clothing  --  3  -AC     Taking care of personal grooming (such as brushing teeth)  --  3  -AC     Eating meals  --  3  -AC     Score  --  15  -AC        Functional Assessment    Outcome Measure Options  AM-PAC 6 Clicks Basic Mobility (PT)  -MIKO  AM-PAC 6 Clicks Daily Activity (OT)  -AC       User Key  (r) = Recorded By, (t) = Taken By, (c) = Cosigned By    Initials Name Provider Type    Margo De La Cruz, PT Physical Therapist    AC Snow Feliz, OT Occupational Therapist         Time Calculation:   PT Charges     Row Name 12/04/18 1130             Time Calculation    Start Time  1130  -MIKO      PT Received On  12/04/18  -MIKO      PT Goal Re-Cert Due Date  12/14/18  -        User Key  (r) = Recorded By, (t) = Taken By, (c) = Cosigned By    Initials Name Provider Type    Margo De La Cruz PT Physical Therapist        Therapy Suggested Charges     Code   Minutes Charges    None           Therapy Charges for Today     Code Description Service Date Service Provider Modifiers Qty    [id number] HC PT EVAL HIGH COMPLEXITY 4 12/4/2018 Margo Love, PT GP 1          PT G-Codes  Outcome Measure Options: AM-PAC 6 Clicks Basic Mobility (PT)  AM-PAC 6 Clicks Score: 14  Score: 15      Margo Love PT  12/4/2018

## 2018-12-05 ENCOUNTER — APPOINTMENT (OUTPATIENT)
Dept: MRI IMAGING | Facility: HOSPITAL | Age: 83
End: 2018-12-05
Attending: INTERNAL MEDICINE

## 2018-12-05 LAB
ANION GAP SERPL CALCULATED.3IONS-SCNC: 7 MMOL/L (ref 3–11)
BUN BLD-MCNC: 15 MG/DL (ref 9–23)
BUN/CREAT SERPL: 23.1 (ref 7–25)
CALCIUM SPEC-SCNC: 8.4 MG/DL (ref 8.7–10.4)
CHLORIDE SERPL-SCNC: 101 MMOL/L (ref 99–109)
CO2 SERPL-SCNC: 22 MMOL/L (ref 20–31)
CREAT BLD-MCNC: 0.65 MG/DL (ref 0.6–1.3)
DEPRECATED RDW RBC AUTO: 44 FL (ref 37–54)
ERYTHROCYTE [DISTWIDTH] IN BLOOD BY AUTOMATED COUNT: 14.9 % (ref 11.3–14.5)
GFR SERPL CREATININE-BSD FRML MDRD: 116 ML/MIN/1.73
GLUCOSE BLD-MCNC: 160 MG/DL (ref 70–100)
HCT VFR BLD AUTO: 32.9 % (ref 38.9–50.9)
HGB BLD-MCNC: 10.6 G/DL (ref 13.1–17.5)
MCH RBC QN AUTO: 25.9 PG (ref 27–31)
MCHC RBC AUTO-ENTMCNC: 32.2 G/DL (ref 32–36)
MCV RBC AUTO: 80.4 FL (ref 80–99)
PLATELET # BLD AUTO: 348 10*3/MM3 (ref 150–450)
PMV BLD AUTO: 8.7 FL (ref 6–12)
POTASSIUM BLD-SCNC: 3.9 MMOL/L (ref 3.5–5.5)
RBC # BLD AUTO: 4.09 10*6/MM3 (ref 4.2–5.76)
SODIUM BLD-SCNC: 130 MMOL/L (ref 132–146)
WBC NRBC COR # BLD: 5.91 10*3/MM3 (ref 3.5–10.8)

## 2018-12-05 PROCEDURE — 25010000002 CEFTRIAXONE PER 250 MG: Performed by: NURSE PRACTITIONER

## 2018-12-05 PROCEDURE — 97116 GAIT TRAINING THERAPY: CPT

## 2018-12-05 PROCEDURE — 99233 SBSQ HOSP IP/OBS HIGH 50: CPT | Performed by: INTERNAL MEDICINE

## 2018-12-05 PROCEDURE — 70551 MRI BRAIN STEM W/O DYE: CPT

## 2018-12-05 PROCEDURE — 80048 BASIC METABOLIC PNL TOTAL CA: CPT | Performed by: INTERNAL MEDICINE

## 2018-12-05 PROCEDURE — 97110 THERAPEUTIC EXERCISES: CPT

## 2018-12-05 PROCEDURE — 85027 COMPLETE CBC AUTOMATED: CPT | Performed by: INTERNAL MEDICINE

## 2018-12-05 PROCEDURE — 25010000002 DEXAMETHASONE PER 1 MG: Performed by: INTERNAL MEDICINE

## 2018-12-05 PROCEDURE — 97530 THERAPEUTIC ACTIVITIES: CPT

## 2018-12-05 RX ADMIN — FINASTERIDE 5 MG: 5 TABLET, FILM COATED ORAL at 08:13

## 2018-12-05 RX ADMIN — GABAPENTIN 100 MG: 100 CAPSULE ORAL at 20:06

## 2018-12-05 RX ADMIN — LISINOPRIL: 20 TABLET ORAL at 08:13

## 2018-12-05 RX ADMIN — SODIUM CHLORIDE, PRESERVATIVE FREE 3 ML: 5 INJECTION INTRAVENOUS at 20:07

## 2018-12-05 RX ADMIN — DEXAMETHASONE SODIUM PHOSPHATE 4 MG: 4 INJECTION, SOLUTION INTRAMUSCULAR; INTRAVENOUS at 08:13

## 2018-12-05 RX ADMIN — Medication 100 MG: at 08:13

## 2018-12-05 RX ADMIN — DEXAMETHASONE SODIUM PHOSPHATE 4 MG: 4 INJECTION, SOLUTION INTRAMUSCULAR; INTRAVENOUS at 02:16

## 2018-12-05 RX ADMIN — SODIUM CHLORIDE, PRESERVATIVE FREE 3 ML: 5 INJECTION INTRAVENOUS at 08:14

## 2018-12-05 RX ADMIN — HYDROCODONE BITARTRATE AND ACETAMINOPHEN 1 TABLET: 5; 325 TABLET ORAL at 08:13

## 2018-12-05 RX ADMIN — GABAPENTIN 100 MG: 100 CAPSULE ORAL at 05:18

## 2018-12-05 RX ADMIN — CEFTRIAXONE SODIUM 1 G: 1 INJECTION, SOLUTION INTRAVENOUS at 08:20

## 2018-12-05 RX ADMIN — Medication 250 MG: at 08:13

## 2018-12-05 RX ADMIN — DEXAMETHASONE SODIUM PHOSPHATE 4 MG: 4 INJECTION, SOLUTION INTRAMUSCULAR; INTRAVENOUS at 16:54

## 2018-12-05 RX ADMIN — CYANOCOBALAMIN TAB 1000 MCG 1000 MCG: 1000 TAB at 08:13

## 2018-12-05 RX ADMIN — GABAPENTIN 100 MG: 100 CAPSULE ORAL at 13:07

## 2018-12-05 RX ADMIN — Medication 250 MG: at 20:06

## 2018-12-05 NOTE — PROGRESS NOTES
Continued Stay Note  [facility]     Patient Name: Sandip Feliz  MRN: [id number]  Today's Date: 12/5/2018    Admit Date: 12/1/2018    Discharge Plan     Row Name 12/05/18 0858       Plan    Plan  Plan is rehab at discharge.    Patient/Family in Agreement with Plan  yes    Plan Comments  Met with patient and spouse at bedside.  PT recommended IP rehab prior to home.  List presented to spouse.  Choices are 1.  [facility], 2.  [facility]  3. [place],  4.  [facility].  Facilities have been notified and are looking at records.  Awaiting rehab placement.     Final Discharge Disposition Code  03 - skilled nursing facility (SNF)        Discharge Codes    No documentation.             Narda Aleman RN

## 2018-12-05 NOTE — PLAN OF CARE
Problem: Patient Care Overview  Goal: Plan of Care Review  Outcome: Ongoing (interventions implemented as appropriate)   12/05/18 7704   Coping/Psychosocial   Plan of Care Reviewed With patient   Plan of Care Review   Progress improving   OTHER   Outcome Summary VSS, still has some pain issues. Had MRI of brain today. Plan to go to [facility] tomorrow.

## 2018-12-05 NOTE — PLAN OF CARE
Problem: Patient Care Overview  Goal: Plan of Care Review  Outcome: Ongoing (interventions implemented as appropriate)   12/05/18 9423   Coping/Psychosocial   Plan of Care Reviewed With patient;spouse   Plan of Care Review   Progress improving   OTHER   Outcome Summary Pt. with improved bed mobility and can perform all ADL task in sitting, but any portion needing to stand still significantly impacted.

## 2018-12-05 NOTE — THERAPY TREATMENT NOTE
Acute Care - Physical Therapy Treatment Note  [facility]     Patient Name: Sandip Feliz  : 1931  MRN: [id number]  Today's Date: 2018  Onset of Illness/Injury or Date of Surgery: 18  Date of Referral to PT: 18  Referring Physician: PITO Bowie    Admit Date: 2018    Visit Dx:    ICD-10-CM ICD-9-CM   1. Acute UTI N39.0 599.0   2. Bilateral lower extremity pain M79.604 729.5    M79.605    3. Unable to ambulate R26.2 719.7   4. History of atrial fibrillation Z86.79 V12.59   5. History of hypertension Z86.79 V12.59   6. At high risk for falls Z91.81 V15.88   7. Acute urinary retention R33.8 788.29   8. Impaired functional mobility, balance, gait, and endurance Z74.09 V49.89     Patient Active Problem List   Diagnosis   • HTN (hypertension)   • Osteoporosis   • Arthritis of knee, left   • S/P left unicompartmental knee replacement   • Acute blood loss anemia, mild, asymptomatic   • Paroxysmal atrial fibrillation (CMS/HCC)   • Acute urinary retention   • Hematuria   • Urinary retention   • S/P greenlight laser vaporization of prostate   • Prediabetes   • Leukocytosis, mild, likely reactive   • Hyponatremia   • Acute UTI (urinary tract infection)   • Anemia   • Generalized weakness   • Pain in both lower extremities   • Acute UTI       Therapy Treatment    Rehabilitation Treatment Summary     Row Name 18 1436 18 0947          Treatment Time/Intention    Discipline  physical therapist  -  occupational therapist  -MIKO     Document Type  therapy note (daily note)  -  therapy note (daily note)  -MIKO     Subjective Information  no complaints  -SJ  complains of;pain  -MIKO     Mode of Treatment  individual therapy  -  individual therapy;occupational therapy  -MIKO     Patient/Family Observations  wife present and very involved  -  wife present throughout session.  -MIKO     Care Plan Review  care plan/treatment goals reviewed;risks/benefits reviewed;current/potential barriers  reviewed;patient/other agree to care plan  -SJ  care plan/treatment goals reviewed;risks/benefits reviewed;current/potential barriers reviewed  -MIKO     Therapy Frequency (PT Clinical Impression)  daily  -SJ  --     Patient Effort  good  -SJ  good  -MIKO     Existing Precautions/Restrictions  fall  -SJ  fall  -MIKO     Patient Response to Treatment  bobbi well  -SJ  --     Recorded by [SJ] Evelia Underwood, PT 12/05/18 1512 [MIKO] Shruthi Levine, OT 12/05/18 1019     Row Name 12/05/18 0947             Vital Signs    Pre Systolic BP Rehab  129  -MIKO      Pre Treatment Diastolic BP  78  -MIKO      Pretreatment Heart Rate (beats/min)  80  -MIKO      Posttreatment Heart Rate (beats/min)  78  -MIKO      Pre Patient Position  Supine  -MIKO      Intra Patient Position  Standing  -MIKO      Post Patient Position  Sitting  -MIKO      Recorded by [MIKO] Shruthi Levine, OT 12/05/18 1019      Row Name 12/05/18 0947             Cognitive Assessment/Intervention    Additional Documentation  Cognitive Assessment/Intervention (Group)  -MIKO      Recorded by [MIKO] Shruthi Levine, OT 12/05/18 1019      Row Name 12/05/18 1436 12/05/18 0947          Cognitive Assessment/Intervention- PT/OT    Affect/Mental Status (Cognitive)  WFL  -SJ  --     Orientation Status (Cognition)  oriented to;person  -SJ  oriented to;person  -MIKO     Follows Commands (Cognition)  follows one step commands;over 90% accuracy;repetition of directions required;other (see comments) limited by Native  -SJ  follows one step commands;over 90% accuracy;repetition of directions required limited by Native  -MIKO     Safety Deficit (Cognitive)  ability to follow commands;insight into deficits/self awareness  -SJ  awareness of need for assistance;safety precautions awareness;safety precautions follow-through/compliance  -MIKO     Personal Safety Interventions  fall prevention program maintained;gait belt;muscle strengthening facilitated;nonskid shoes/slippers when out of bed  -  fall prevention program  maintained;gait belt;nonskid shoes/slippers when out of bed  -MIKO     Recorded by [SJ] Evelia Underwood, PT 12/05/18 1512 [MIKO] Shruthi Levine, OT 12/05/18 1019     Row Name 12/05/18 1436 12/05/18 0947          Safety Issues, Functional Mobility    Safety Issues Affecting Function (Mobility)  awareness of need for assistance;insight into deficits/self awareness;positioning of assistive device;sequencing abilities  -SJ  awareness of need for assistance;safety precaution awareness;safety precautions follow-through/compliance  -MIKO     Impairments Affecting Function (Mobility)  balance;endurance/activity tolerance;postural/trunk control;strength;pain  -SJ  balance;endurance/activity tolerance;postural/trunk control;strength;pain  -MIKO     Recorded by [SJ] Evelia Underwood, PT 12/05/18 1512 [MIKO] Shruthi Levine, OT 12/05/18 1019     Row Name 12/05/18 1436 12/05/18 0947          Bed Mobility Assessment/Treatment    Bed Mobility Assessment/Treatment  --  supine-sit;scooting/bridging  -MIKO     Scooting/Bridging [place] (Bed Mobility)  --  supervision;verbal cues  -MIKO     Supine-Sit [place] (Bed Mobility)  supervision;verbal cues  -SJ  supervision;verbal cues  -MIKO     Bed Mobility, Safety Issues  decreased use of arms for pushing/pulling;decreased use of legs for bridging/pushing;impaired trunk control for bed mobility  -SJ  decreased use of arms for pushing/pulling;decreased use of legs for bridging/pushing;impaired trunk control for bed mobility  -MIKO     Assistive Device (Bed Mobility)  bed rails;head of bed elevated  -SJ  bed rails;head of bed elevated  -MIKO     Comment (Bed Mobility)  extra time and effort needed  -SJ  --     Recorded by [SJ] Evelia Underwood, PT 12/05/18 1512 [MIKO] Shruthi Levine, OT 12/05/18 1019     Row Name 12/05/18 0996             Functional Mobility    Functional Mobility- Ind. Level  verbal cues required;moderate assist (50% patient effort)  -MIKO      Functional Mobility- Device  rolling  walker  -MIKO      Functional Mobility-Distance (Feet)  5  -MIKO      Functional Mobility- Safety Issues  step length decreased;weight-shifting ability decreased;loses balance backward  -MIKO      Recorded by [MIKO] Shruthi Levine, OT 12/05/18 1019      Row Name 12/05/18 1436 12/05/18 0947          Transfer Assessment/Treatment    Comment (Transfers)  cues for hand placement during t/f's and cues not to sit impulsively  -SJ  cues for hand placement and to fullly bring back walker to sit.  Assist not to plop back in chair when sitting.  -MIKO     Recorded by [SJ] Evelia Underwood, PT 12/05/18 1512 [MIKO] Shruthi Levine, OT 12/05/18 1019     Row Name 12/05/18 1436 12/05/18 0947          Sit-Stand Transfer    Sit-Stand [place] (Transfers)  minimum assist (75% patient effort);verbal cues  -SJ  nonverbal cues (demo/gesture);verbal cues;minimum assist (75% patient effort)  -MIKO     Assistive Device (Sit-Stand Transfers)  walker, front-wheeled  -SJ  walker, front-wheeled  -MIKO     Recorded by [SJ] Evelia Underwood, PT 12/05/18 1512 [MIKO] Shruthi Levine, OT 12/05/18 1019     Row Name 12/05/18 1436 12/05/18 0947          Stand-Sit Transfer    Stand-Sit [place] (Transfers)  moderate assist (50% patient effort);verbal cues  -SJ  verbal cues;moderate assist (50% patient effort)  -MIKO     Assistive Device (Stand-Sit Transfers)  walker, front-wheeled  -SJ  walker, front-wheeled  -MIKO     Recorded by [SJ] Evelia Underwood, PT 12/05/18 1512 [MIKO] Shruthi Levine, OT 12/05/18 1019     Row Name 12/05/18 1436             Gait/Stairs Assessment/Training    87786 - Gait Training Minutes   23  -SJ      Gait/Stairs Assessment/Training  gait/ambulation independence  -SJ      [place] Level (Gait)  minimum assist (75% patient effort);2 person assist;verbal cues  -SJ      Assistive Device (Gait)  walker, front-wheeled  -SJ      Distance in Feet (Gait)  2x 15ft  -SJ      Pattern (Gait)  step-to  -SJ      Deviations/Abnormal Patterns (Gait)   stride length decreased;base of support, narrow;scissoring  -SJ      Bilateral Gait Deviations  heel strike decreased;forward flexed posture  -SJ      Comment (Gait/Stairs)  Pt performed set of standing heel raises before ambulating to aid in decreasing posterior lean. Pt required verbal and tactile cues to decrease trunk flex.  -SJ      Recorded by [SJ] Evelia Underwood, PT 12/05/18 1512      Row Name 12/05/18 0947             ADL Assessment/Intervention    70438 - OT Self Care/Mgmt Minutes  3  -MIKO      BADL Assessment/Intervention  lower body dressing  -MIKO      Recorded by [MIKO] Shruthi Levine, OT 12/05/18 1019      Row Name 12/05/18 0947             Lower Body Dressing Assessment/Training    Lower Body Dressing [place] Level  doff;don;socks;independent  -MIKO      Lower Body Dressing Position  supported sitting  -MIKO      Comment (Lower Body Dressing)  appears with good independence in sitting, but will need to work toward standing portion of dressing, such as pulling up and fastening pants.  -MIKO      Recorded by [MIKO] Shruthi Levine, OT 12/05/18 1019      Row Name 12/05/18 0947             BADL Safety/Performance    Impairments, BADL Safety/Performance  balance;pain;strength  -MIKO      Recorded by [MIKO] Shruthi Levine, OT 12/05/18 1019      Row Name 12/05/18 0947             Motor Skills Assessment/Interventions    Additional Documentation  Balance (Group);Balance Interventions (Group);Therapeutic Exercise (Group);Therapeutic Exercise Interventions (Group)  -MIKO      Recorded by [MIKO] Shruthi Levine, OT 12/05/18 1019      Row Name 12/05/18 0947             Therapeutic Exercise    59009 - OT Therapeutic Exercise Minutes  10  -MIKO      77258 - OT Therapeutic Activity Minutes  10  -MIKO      Recorded by [MIKO] Shruthi Levine, OT 12/05/18 1019      Row Name 12/05/18 0947             Therapeutic Exercise    Upper Extremity Range of Motion (Therapeutic Exercise)  shoulder flexion/extension, bilateral;shoulder  abduction/adduction, bilateral;shoulder horizontal abduction/adduction, bilateral;elbow flexion/extension, bilateral scapula elevation and retraction  -MIKO      Lower Extremity (Therapeutic Exercise)  gluteal sets  -MIKO      Exercise Type (Therapeutic Exercise)  AROM (active range of motion);resistive exercises mod resistance biceps and triceps  -MIKO      Position (Therapeutic Exercise)  seated  -MIKO      Sets/Reps (Therapeutic Exercise)  1/10  -MIKO      Expected Outcome (Therapeutic Exercise)  improve functional tolerance, self-care activity;improve performance, transfer skills  -MIKO      Comment (Therapeutic Exercise)  2 rest  -MIKO      Recorded by [MIKO] Shruthi Levine, OT 12/05/18 1019      Row Name 12/05/18 0947             Balance    Balance  dynamic sitting balance;dynamic standing balance  -MIKO      Recorded by [MIKO] Shruthi Levine, OT 12/05/18 1019      Row Name 12/05/18 1436 12/05/18 0947          Dynamic Sitting Balance    Level of [place], Reaches Outside Midline (Sitting, Dynamic Balance)  supervision  -SJ  supervision  -MIKO     Sitting Position, Reaches Outside Midline (Sitting, Dynamic Balance)  sitting in chair  -SJ  --     Recorded by [SJ] Evelia Underwood, PT 12/05/18 1512 [MIKO] Shruthi Levine, OT 12/05/18 1019     Row Name 12/05/18 1436             Static Standing Balance    Level of [place] (Supported Standing, Static Balance)  moderate assist, 50 to 74% patient effort  -SJ      Recorded by [SJ] Evelia Underwood, PT 12/05/18 1512      Row Name 12/05/18 1436 12/05/18 0947          Dynamic Standing Balance    Level of [place], Reaches Outside Midline (Standing, Dynamic Balance)  moderate assist, 50 to 74% patient effort  -SJ  moderate assist, 50 to 74% patient effort  -MIKO     Recorded by [SJ] vEelia Underwood, PT 12/05/18 1512 [MIKO] Shruthi Levine, OT 12/05/18 1019     Row Name 12/05/18 1436 12/05/18 0947          Positioning and Restraints    Pre-Treatment Position  in bed  -SJ  in bed  -MIKO      Post Treatment Position  chair  -SJ  chair  -MIKO     In Chair  reclined;call light within reach;encouraged to call for assist;exit alarm on;with family/caregiver;on mechanical lift sling;heels elevated  -SJ  reclined;call light within reach;encouraged to call for assist;exit alarm on;with family/caregiver  -MIKO     Recorded by [SJ] Evelia Underwood, PT 12/05/18 1512 [MIKO] Shruthi Levine, OT 12/05/18 1019     Row Name 12/05/18 1436             Pain Assessment    Additional Documentation  Pain Scale: Word Pre/Post-Treatment (Group)  -SJ      Recorded by [SJ] Evelia Underwood, PT 12/05/18 1512      Row Name 12/05/18 0947             Pain Scale: Numbers Pre/Post-Treatment    Pain Scale: Numbers, Pretreatment  8/10 pt. smiling as rates.  Pt. appears to overrate despite exp.  -MIKO      Pain Scale: Numbers, Post-Treatment  8/10  -MIKO      Pain Location - Side  Bilateral  -MIKO      Pain Location - Orientation  lower  -MIKO      Pain Location  extremity  -MIKO      Pain Intervention(s)  Ambulation/increased activity;Repositioned  -MIKO      Recorded by [MIKO] Shruthi Levine, OT 12/05/18 1019      Row Name 12/05/18 1436             Pain Scale: Word Pre/Post-Treatment    Pain: Word Scale, Pretreatment  0 - no pain  -SJ      Pain: Word Scale, Post-Treatment  0 - no pain  -SJ      Recorded by [SJ] Evelia Underwood, PT 12/05/18 1512      Row Name 12/05/18 1436             Coping    Observed Emotional State  accepting;calm;cooperative  -SJ      Verbalized Emotional State  acceptance  -SJ      Recorded by [SJ] Evelia Underwood, PT 12/05/18 1512      Row Name 12/05/18 1436             Plan of Care Review    Plan of Care Reviewed With  patient;spouse  -      Recorded by [SJ] Evelia Underwood, PT 12/05/18 1512      Row Name 12/05/18 0947             Outcome Summary/Treatment Plan (OT)    Daily Summary of Progress (OT)  progress toward functional goals is good  -MIKO      Barriers to Overall Progress (OT)  chronic LE pain, PLOF  -MIKO      Plan  for Continued Treatment (OT)  Cont OT POC  -MIKO      Recorded by [MIKO] Shruthi Levine Karen, OT 12/05/18 1019      Row Name 12/05/18 1436             Outcome Summary/Treatment Plan (PT)    Daily Summary of Progress (PT)  progress toward functional goals is good  -LANETTE      Recorded by [SJ] Evelia Underwood, PT 12/05/18 1512        User Key  (r) = Recorded By, (t) = Taken By, (c) = Cosigned By    Initials Name Effective Dates Discipline    MIKO Shruthi Levine Karen, OT 06/08/18 -  OT    SJ Evelia Underwood, PT 06/19/15 -  PT                   Physical Therapy Education     Title: PT OT SLP Therapies (In Progress)     Topic: Physical Therapy (In Progress)     Point: Mobility training (In Progress)     Learning Progress Summary           Patient Acceptance, E,D, NR by LANETTE at 12/5/2018  3:14 PM    Acceptance, E, NR by MIKO at 12/4/2018 11:30 AM   Significant Other Acceptance, E,D, NR by LANETTE at 12/5/2018  3:14 PM    Acceptance, E, NR by MIKO at 12/4/2018 11:30 AM                   Point: Home exercise program (In Progress)     Learning Progress Summary           Patient Acceptance, E,D, NR by LANETTE at 12/5/2018  3:14 PM    Acceptance, E, NR by MIKO at 12/4/2018 11:30 AM   Significant Other Acceptance, E,D, NR by LANETTE at 12/5/2018  3:14 PM    Acceptance, E, NR by MIKO at 12/4/2018 11:30 AM                   Point: Body mechanics (In Progress)     Learning Progress Summary           Patient Acceptance, E,D, NR by LANETTE at 12/5/2018  3:14 PM    Acceptance, E, NR by MIKO at 12/4/2018 11:30 AM   Significant Other Acceptance, E,D, NR by LANETTE at 12/5/2018  3:14 PM    Acceptance, E, NR by MIKO at 12/4/2018 11:30 AM                   Point: Precautions (In Progress)     Learning Progress Summary           Patient Acceptance, E,D, NR by LANETTE at 12/5/2018  3:14 PM    Acceptance, E, NR by MIKO at 12/4/2018 11:30 AM   Significant Other Acceptance, E,D, NR by LANETTE at 12/5/2018  3:14 PM    Acceptance, E, NR by MIKO at 12/4/2018 11:30 AM                               User Key      Initials Effective Dates Name Provider Type Discipline     06/19/15 -  Margo Love PT Physical Therapist PT     06/19/15 -  Evelia Underwood, FABIANA Physical Therapist PT                PT Recommendation and Plan  Therapy Frequency (PT Clinical Impression): daily  Outcome Summary/Treatment Plan (PT)  Daily Summary of Progress (PT): progress toward functional goals is good  Plan of Care Reviewed With: patient  Progress: improving  Outcome Summary: Pt able to increase ambulation distance to 2 x 15ft with RW with [place] x2. Pt requires significant cues for posture, weightshifting, and gait quality. Pt very [place]. Continue POC.  Outcome Measures     Row Name 12/05/18 1436 12/05/18 0947 12/04/18 1130       How much help from another person do you currently need...    Turning from your back to your side while in flat bed without using bedrails?  4  -SJ  --  4  -MIKO    Moving from lying on back to sitting on the side of a flat bed without bedrails?  4  -SJ  --  3  -MIKO    Moving to and from a bed to a chair (including a wheelchair)?  2  -SJ  --  2  -MIKO    Standing up from a chair using your arms (e.g., wheelchair, bedside chair)?  2  -SJ  --  2  -MIKO    Climbing 3-5 steps with a railing?  1  -SJ  --  1  -MIKO    To walk in hospital room?  2  -SJ  --  2  -MIKO    AM-[facility] 6 Clicks Score  15  -SJ  --  14  -MIKO       How much help from another is currently needed...    Putting on and taking off regular lower body clothing?  --  3  -JBA  --    Bathing (including washing, rinsing, and drying)  --  2  -JBA  --    Toileting (which includes using toilet bed pan or urinal)  --  2  -JBA  --    Putting on and taking off regular upper body clothing  --  3  -JBA  --    Taking care of personal grooming (such as brushing teeth)  --  3  -JBA  --    Eating meals  --  3  -JBA  --    Score  --  16  -JBA  --       Functional Assessment    Outcome Measure Options  AM-PAC 6 Clicks Basic Mobility (PT)  -  AM-PAC 6 Clicks Daily Activity (OT)  -JBA   AM-PAC 6 Clicks Basic Mobility (PT)  -MIKO    Row Name 12/04/18 1129             How much help from another is currently needed...    Putting on and taking off regular lower body clothing?  2  -AC      Bathing (including washing, rinsing, and drying)  2  -AC      Toileting (which includes using toilet bed pan or urinal)  2  -AC      Putting on and taking off regular upper body clothing  3  -AC      Taking care of personal grooming (such as brushing teeth)  3  -AC      Eating meals  3  -AC      Score  15  -AC         Functional Assessment    Outcome Measure Options  AM-PAC 6 Clicks Daily Activity (OT)  -AC        User Key  (r) = Recorded By, (t) = Taken By, (c) = Cosigned By    Initials Name Provider Type    Margo De La Cruz, PT Physical Therapist    Shruthi Youngblood, OT Occupational Therapist    Snow Ventura, OT Occupational Therapist    Evelia Mares, PT Physical Therapist         Time Calculation:   PT Charges     Row Name 12/05/18 1514 12/05/18 1436          Time Calculation    Start Time  1436  -  --     PT Received On  12/05/18  -  --     PT Goal Re-Cert Due Date  12/14/18  -  --        Time Calculation- PT    Total Timed Code Minutes- PT  23 minute(s)  -  --        Timed Charges    79004 - Gait Training Minutes   --  23  -SJ       User Key  (r) = Recorded By, (t) = Taken By, (c) = Cosigned By    Initials Name Provider Type    Evelia Mares, PT Physical Therapist        Therapy Suggested Charges     Code   Minutes Charges    26057 (CPT®) Hc Pt Neuromusc Re Education Ea 15 Min      38532 (CPT®) Hc Pt Ther Proc Ea 15 Min      90263 (CPT®) Hc Gait Training Ea 15 Min 23 2    95806 (CPT®) Hc Pt Therapeutic Act Ea 15 Min      48365 (CPT®) Hc Pt Manual Therapy Ea 15 Min      66693 (CPT®) Hc Pt Iontophoresis Ea 15 Min      63488 (CPT®) Hc Pt Elec Stim Ea-Per 15 Min      42080 (CPT®) Hc Pt Ultrasound Ea 15 Min      68139 (CPT®) Hc Pt Self Care/Mgmt/Train Ea 15 Min      39376 (CPT®) Hc Pt  Prosthetic (S) Train Initial Encounter, Each 15 Min      73530 (CPT®) Hc Pt Orthotic(S)/Prosthetic(S) Encounter, Each 15 Min      00991 (CPT®) Hc Orthotic(S) Mgmt/Train Initial Encounter, Each 15min      Total  23 2        Therapy Charges for Today     Code Description Service Date Service Provider Modifiers Qty    34627618082 HC GAIT TRAINING EA 15 MIN 12/5/2018 Evelia Underwood, PT GP 2    29899463169 HC PT THER SUPP EA 15 MIN 12/5/2018 Evelia Underwood, PT GP 2          PT G-Codes  Outcome Measure Options: AM-PAC 6 Clicks Basic Mobility (PT)  AM-PAC 6 Clicks Score: 15  Score: 16    Evelia Underwood PT  12/5/2018

## 2018-12-05 NOTE — PLAN OF CARE
Problem: Patient Care Overview  Goal: Plan of Care Review  Outcome: Ongoing (interventions implemented as appropriate)   12/05/18 0412   Coping/Psychosocial   Plan of Care Reviewed With patient   Plan of Care Review   Progress improving     Goal: Individualization and Mutuality  Outcome: Ongoing (interventions implemented as appropriate)      Problem: Fall Risk (Adult)  Goal: Absence of Fall  Outcome: Ongoing (interventions implemented as appropriate)      Problem: Skin Injury Risk (Adult)  Goal: Skin Health and Integrity  Outcome: Ongoing (interventions implemented as appropriate)

## 2018-12-05 NOTE — PROGRESS NOTES
Continued Stay Note  [facility]     Patient Name: Sandip Feliz  MRN: [id number]  Today's Date: 12/5/2018    Admit Date: 12/1/2018    Discharge Plan     Row Name 12/05/18 1223       Plan    Plan  Plan is transfer to [facility] pulmonary unit on 12-6-18 for rehab.    Patient/Family in Agreement with Plan  yes    Plan Comments  Plan is [facility] Pulmonary unt on 12-6-18.  Per OT, patient able to ride in car if they use a gait belt.    Final Discharge Disposition Code  03 - skilled nursing facility (SNF)        Discharge Codes    No documentation.             Narda Aleman RN

## 2018-12-05 NOTE — PROGRESS NOTES
[facility] Medicine Services  PROGRESS NOTE    Patient Name: Sandip Feliz  : 1931  MRN: [id number]    Date of Admission: 2018  Length of Stay: 3  Primary Care Physician: Kelly Robison MD    Subjective   Subjective     CC:leg pain and weakness    HPI:No acute events overnight, patient states that he is doing ok,pain is better.    Review of Systems  Gen- No fevers, chills  CV- No chest pain, palpitations  Resp- No cough, dyspnea  GI- No N/V/D, abd pain    Otherwise ROS is negative except as mentioned in the HPI.    Objective   Objective     Vital Signs:   Temp:  [97.4 °F (36.3 °C)-98.4 °F (36.9 °C)] 97.4 °F (36.3 °C)  Heart Rate:  [59-85] 76  Resp:  [16-18] 18  BP: (127-141)/(62-90) 129/78     Physical Exam:  Constitutional: No acute distress, awake, alert  HENT: NCAT, mucous membranes moist  Respiratory: Clear to auscultation bilaterally, respiratory effort normal   Cardiovascular: RRR, no murmurs, rubs, or gallops, palpable pedal pulses bilaterally  Gastrointestinal: Positive bowel sounds, soft, nontender, nondistended  Musculoskeletal: No bilateral ankle edema  Psychiatric: Appropriate affect, cooperative  Neurologic: Oriented x 3, no focal deficits  Skin: No rashes    Results Reviewed:  I have personally reviewed current lab, radiology, and data and agree.    Results from last 7 days   Lab Units  18   0712  1848  18   05   WBC 10*3/mm3  5.91  6.85  8.45   HEMOGLOBIN g/dL  10.6*  10.4*  10.2*   HEMATOCRIT %  32.9*  32.6*  33.1*   PLATELETS 10*3/mm3  348  317  363     Results from last 7 days   Lab Units  18   0712  18   0648  18   0522   18   1405   SODIUM mmol/L  130*  133  133   < >  126*   POTASSIUM mmol/L  3.9  3.9  3.5   < >  3.8   CHLORIDE mmol/L  101  105  102   < >  97*   CO2 mmol/L  22.0  20.0  21.0   < >  22.0   BUN mg/dL  15  8*  9   < >  17   CREATININE mg/dL  0.65  0.57*  0.65   < >  0.89   GLUCOSE mg/dL   160*  111*  115*   < >  116*   CALCIUM mg/dL  8.4*  8.2*  8.3*   < >  8.9   ALT (SGPT) U/L   --    --    --    --   17   AST (SGOT) U/L   --    --    --    --   23    < > = values in this interval not displayed.     Estimated Creatinine Clearance: 58.4 mL/min (by C-G formula based on SCr of 0.65 mg/dL).  No results found for: BNP    Microbiology Results Abnormal     Procedure Component Value - Date/Time    Urine Culture - Urine, Urine, Clean Catch [[id number]]  (Abnormal)  (Susceptibility) Collected:  12/01/18 1402    Lab Status:  Final result Specimen:  Urine, Clean Catch Updated:  12/03/18 1141     Urine Culture 20,000-30,000 CFU/mL Escherichia coli      20,000-30,000 CFU/mL Normal Urogenital Hailey    Susceptibility      Escherichia coli     QUIN     Ampicillin Resistant     Ampicillin + Sulbactam Resistant     Aztreonam Susceptible     Cefepime Susceptible     Cefotaxime Susceptible     Ceftriaxone Susceptible     Cefuroxime sodium Susceptible     Cephalothin Intermediate     Ciprofloxacin Resistant     Ertapenem Susceptible     Gentamicin Susceptible     Levofloxacin Resistant     Meropenem Susceptible     Nitrofurantoin Susceptible     Piperacillin + Tazobactam Susceptible     Tetracycline Susceptible     Tobramycin Susceptible     Trimethoprim + Sulfamethoxazole Susceptible                          Imaging Results (last 24 hours)     Procedure Component Value Units Date/Time    MRI Lumbar Spine Without Contrast [[id number]] Collected:  12/04/18 1035     Updated:  12/04/18 1035    Narrative:       EXAMINATION: MRI LUMBAR SPINE WO CONTRAST- 12/04/2018     INDICATION: N39.0-Urinary tract infection, site not specified;  M79.604-Pain in right leg; M79.605-Pain in left leg; R26.2-Difficulty in  walking, not elsewhere classified; Z86.79-Personal history of other  diseases of the circulatory system; Z86.79-Personal history of other  diseases of the circulatory system; Z91.81-History of falling;  R33.8-Other          TECHNIQUE: Sagittal T1-T2 STIR and axial T2-weighted images of the  lumbar spine.     COMPARISON: NONE     FINDINGS: Patient indicates lower back pain radiating to right leg,  uncomfortable standing, lying flat.     Images are significantly motion degraded throughout the study. There is  straightening of the normal lumbar lordosis. No compression deformity or  significant focal subluxation is seen. There is multilevel canal  narrowing due to disc protrusions and posterior element hypertrophy.  There appears to be only mild edema associated with degenerative disc  changes, particularly at L5-S1, and no evidence of underlying pathologic  lesion.     Axial images show canal borderline stenotic at T12-L1.     At L1-2, canal appears normal diameter. Foramina appear lower limits of  normal.     At L2-3, there is mild asymmetric canal narrowing due to facet  osteophytes and irregular disc bulge. Foramina appear normal on the  right and mildly narrowed on the left.     At L3-4, there is mild to moderate canal stenosis, with large facet  osteophyte and disc protrusion causing significant narrowing of the left  one third of bony canal, and causing thecal sac to appear deviated to  the right. Right neural foramen is at least moderately narrowed. Left  neural foramen appears obliterated by bony hypertrophic change.     At L4-5, there is moderate canal stenosis due to posterior element  hypertrophy and disc protrusion. There is again high-grade left-sided  foraminal stenosis and there appears to be only mild right-sided  foraminal stenosis.     At L5-S1, there is a central disc protrusion and osteophyte, with what  is thought to be significant narrowing of the left one half of the canal  and milder narrowing of the right one half of the canal. There appears  to be left lateral recess stenosis as well whether due to disc  protrusion or facet osteophyte. Left neural foramen is markedly  narrowed. Right neural foramen appears  practically completely effaced.       Impression:       1. Heavily motion degraded exam. No evidence of acute trauma or  significant focal subluxation.  2. High-grade multilevel foraminal stenosis, including moderate right  and marked to severe left-sided foraminal stenosis at L3-4, marked  left-sided foraminal stenosis at L4-5, marked bilateral foraminal  stenosis at L5-S1.  3. Asymmetric mild to moderate canal stenosis at L3-4, moderate canal  stenosis at L4-5 and milder L2-3 canal stenosis. Bony hypertrophic  change causes irregular canal narrowing. Please see above complete  description by disc level.     D:  12/04/2018  E:  12/04/2018           Results for orders placed during the hospital encounter of 12/01/18   Adult Transthoracic Echo Complete W/ Cont if Necessary Per Protocol    Narrative · Left ventricular systolic function is normal.  · Estimated EF appears to be in the range of 56 - 60%  · Left ventricular diastolic dysfunction (grade I) consistent with   impaired relaxation.  · Trace to mild tricuspid valve regurgitation is present. Estimated right   ventricular systolic pressure from tricuspid regurgitation is normal (<35   mmHg)          I have reviewed the medications.      amLODIPine-lisinopril 5-20 mg combo dose  Oral Q24H   ceftriaxone 1 g Intravenous Q24H   dexamethasone 4 mg Intravenous Q8H   docusate sodium 100 mg Oral BID   finasteride 5 mg Oral Daily   gabapentin 100 mg Oral Q8H   saccharomyces boulardii 250 mg Oral BID   sodium chloride 3 mL Intravenous Q12H   thiamine 100 mg Oral Daily   vitamin B-12 1,000 mcg Oral Daily         Assessment/Plan   Assessment / Plan     Active Hospital Problems    Diagnosis   • **Generalized weakness   • Acute UTI   • Acute UTI (urinary tract infection)   • Anemia   • Pain in both lower extremities   • Hyponatremia   • Urinary retention   • Prediabetes   • Paroxysmal atrial fibrillation (CMS/HCC)   • HTN (hypertension)        Brief Hospital Course to  "date:  Sandip Feliz is a 87 y.o. male with PMH of HTN, BPH, chronic pain, GERD, Afib on chronic Xarelto but has been on hold due to hematuria, and recent admission in October for urinary retention s/p reduction of paraphimosis and cystoscopy with clot evacuation by Dr. Rausch with removal of Shaffer approximately three weeks ago who presents with complaints of generalized weakness and acute urinary retention.        Assessment & Plan:  --L5-S1 Radiculopathy- subacute/chronic per EMG/NCS 12/3 with acute inability to walk and urinary retention.  Neurology started Gabapentin and have now signed off.  MRI L spine shows high-grade multilevel foraminal stenosis, started IV Decadron to alleviate pain, will defer to NS  Dr Mccallum   --NSGY consulted per Dr. Reyna no plans for any intervention rec eval with  who has previously seen pt  -- UTI- UCx only growing 20-30K E coli (susceptible to Rocephin), however, given his recent history, will treat as UTI for time being. Continue IV Rocephin   --Hyponatremia- holding HCTZ, also could be chronic from EtOH use (drinks \"one drink of vodka\" daily). Better today  --Urinary retention- s/p Shaffer placement, consulted Urology, appreciate their input  --PAF- continue rate control, Xarelto on hold per Urology's recs last admission.   --HTN- continue home meds     DVT Prophylaxis:  mechanical     CODE STATUS:   Code Status and Medical Interventions:   Ordered at: 12/01/18 2021     Level Of Support Discussed With:    Patient     Code Status:    CPR     Medical Interventions (Level of Support Prior to Arrest):    Full       Disposition: TBD    Electronically signed by Ab Gonzalez MD, 12/05/18, 8:37 AM.    "

## 2018-12-05 NOTE — THERAPY TREATMENT NOTE
Acute Care - Occupational Therapy Treatment Note  [facility]     Patient Name: Sandip Feliz  : 1931  MRN: [id number]  Today's Date: 2018  Onset of Illness/Injury or Date of Surgery: 18  Date of Referral to OT: 18  Referring Physician: PITO Bowie    Admit Date: 2018       ICD-10-CM ICD-9-CM   1. Acute UTI N39.0 599.0   2. Bilateral lower extremity pain M79.604 729.5    M79.605    3. Unable to ambulate R26.2 719.7   4. History of atrial fibrillation Z86.79 V12.59   5. History of hypertension Z86.79 V12.59   6. At high risk for falls Z91.81 V15.88   7. Acute urinary retention R33.8 788.29   8. Impaired functional mobility, balance, gait, and endurance Z74.09 V49.89     Patient Active Problem List   Diagnosis   • HTN (hypertension)   • Osteoporosis   • Arthritis of knee, left   • S/P left unicompartmental knee replacement   • Acute blood loss anemia, mild, asymptomatic   • Paroxysmal atrial fibrillation (CMS/HCC)   • Acute urinary retention   • Hematuria   • Urinary retention   • S/P greenlight laser vaporization of prostate   • Prediabetes   • Leukocytosis, mild, likely reactive   • Hyponatremia   • Acute UTI (urinary tract infection)   • Anemia   • Generalized weakness   • Pain in both lower extremities   • Acute UTI     Past Medical History:   Diagnosis Date   • A-fib (CMS/HCC)    • Arthritis    • Shaffer catheter in place    • GERD (gastroesophageal reflux disease)    • Hearing impairment    • Hearing loss     both ears hearing aides   • Heartburn    • Hypertension    • Melanoma (CMS/HCC)    • Wears dentures    • Wears dentures     partial bottom full at top   • Wears glasses    • Wears hearing aid      Past Surgical History:   Procedure Laterality Date   • APPENDECTOMY     • CATARACT EXTRACTION     • COLONOSCOPY      2 years ago   • SKIN CANCER EXCISION      MELANOMA   • STOMACH SURGERY         Therapy Treatment    Rehabilitation Treatment Summary     Row Name 18 0947              Treatment Time/Intention    Discipline  occupational therapist  -MIKO      Document Type  therapy note (daily note)  -MIKO      Subjective Information  complains of;pain  -MIKO      Mode of Treatment  individual therapy;occupational therapy  -MIKO      Patient/Family Observations  wife present throughout session.  -MIKO      Care Plan Review  care plan/treatment goals reviewed;risks/benefits reviewed;current/potential barriers reviewed  -MIKO      Patient Effort  good  -MIKO      Existing Precautions/Restrictions  fall  -MIKO      Recorded by [MIKO] Shruthi Levine OT 12/05/18 1019      Row Name 12/05/18 0947             Vital Signs    Pre Systolic BP Rehab  129  -MIKO      Pre Treatment Diastolic BP  78  -MIKO      Pretreatment Heart Rate (beats/min)  80  -MIKO      Posttreatment Heart Rate (beats/min)  78  -MIKO      Pre Patient Position  Supine  -MIKO      Intra Patient Position  Standing  -MIKO      Post Patient Position  Sitting  -MIKO      Recorded by [MIKO] Shruthi Levine OT 12/05/18 1019      Row Name 12/05/18 0947             Cognitive Assessment/Intervention    Additional Documentation  Cognitive Assessment/Intervention (Group)  -MIKO      Recorded by [MIKO] Shruthi Levine OT 12/05/18 1019      Row Name 12/05/18 0947             Cognitive Assessment/Intervention- PT/OT    Orientation Status (Cognition)  oriented to;person  -MIKO      Follows Commands (Cognition)  follows one step commands;over 90% accuracy;repetition of directions required limited by [place]  -MIKO      Safety Deficit (Cognitive)  awareness of need for assistance;safety precautions awareness;safety precautions follow-through/compliance  -MIKO      Personal Safety Interventions  fall prevention program maintained;gait belt;nonskid shoes/slippers when out of bed  -MIKO      Recorded by [MIKO] Shruthi Levine OT 12/05/18 1019      Row Name 12/05/18 0947             Safety Issues, Functional Mobility    Safety Issues Affecting Function (Mobility)  awareness of need for  assistance;safety precaution awareness;safety precautions follow-through/compliance  -MIKO      Impairments Affecting Function (Mobility)  balance;endurance/activity tolerance;postural/trunk control;strength;pain  -MIKO      Recorded by [MIKO] Shruthi Levine, OT 12/05/18 1019      Row Name 12/05/18 0947             Bed Mobility Assessment/Treatment    Bed Mobility Assessment/Treatment  supine-sit;scooting/bridging  -MIKO      Scooting/Bridging [place] (Bed Mobility)  supervision;verbal cues  -MIKO      Supine-Sit [place] (Bed Mobility)  supervision;verbal cues  -MIKO      Bed Mobility, Safety Issues  decreased use of arms for pushing/pulling;decreased use of legs for bridging/pushing;impaired trunk control for bed mobility  -MIKO      Assistive Device (Bed Mobility)  bed rails;head of bed elevated  -MIKO      Recorded by [MIKO] Shruthi Levine, SWETHA 12/05/18 1019      Row Name 12/05/18 09             Functional Mobility    Functional Mobility- Ind. Level  verbal cues required;moderate assist (50% patient effort)  -MIKO      Functional Mobility- Device  rolling walker  -MIKO      Functional Mobility-Distance (Feet)  5  -MIKO      Functional Mobility- Safety Issues  step length decreased;weight-shifting ability decreased;loses balance backward  -MIKO      Recorded by [MIKO] Shruthi Levine, OT 12/05/18 1019      Row Name 12/05/18 0947             Transfer Assessment/Treatment    Comment (Transfers)  cues for hand placement and to fullly bring back walker to sit.  Assist not to plop back in chair when sitting.  -MIKO      Recorded by [MIKO] Shruthi Levine, OT 12/05/18 1019      Row Name 12/05/18 0947             Sit-Stand Transfer    Sit-Stand [place] (Transfers)  nonverbal cues (demo/gesture);verbal cues;minimum assist (75% patient effort)  -MIKO      Assistive Device (Sit-Stand Transfers)  walker, front-wheeled  -MIKO      Recorded by [MIKO] Shruthi Levine OT 12/05/18 1019      Row Name 12/05/18 0947             Stand-Sit Transfer     Stand-Sit [place] (Transfers)  verbal cues;moderate assist (50% patient effort)  -MIKO      Assistive Device (Stand-Sit Transfers)  walker, front-wheeled  -MIKO      Recorded by [MIKO] Shruthi Levine, OT 12/05/18 1019      Row Name 12/05/18 0947             ADL Assessment/Intervention    41462 - OT Self Care/Mgmt Minutes  3  -MIKO      BADL Assessment/Intervention  lower body dressing  -MIKO      Recorded by [MIKO] Shruthi Levine, OT 12/05/18 1019      Row Name 12/05/18 09             Lower Body Dressing Assessment/Training    Lower Body Dressing [place] Level  doff;don;socks;independent  -MIKO      Lower Body Dressing Position  supported sitting  -MIKO      Comment (Lower Body Dressing)  appears with good independence in sitting, but will need to work toward standing portion of dressing, such as pulling up and fastening pants.  -MIKO      Recorded by [MIKO] Shruthi Levine, OT 12/05/18 1019      Row Name 12/05/18 09             BADL Safety/Performance    Impairments, BADL Safety/Performance  balance;pain;strength  -MIKO      Recorded by [MIKO] Shruthi Levine, OT 12/05/18 1019      Row Name 12/05/18 0947             Motor Skills Assessment/Interventions    Additional Documentation  Balance (Group);Balance Interventions (Group);Therapeutic Exercise (Group);Therapeutic Exercise Interventions (Group)  -MIKO      Recorded by [MIKO] Shruthi Levine, OT 12/05/18 1019      Row Name 12/05/18 0947             Therapeutic Exercise    96402 - OT Therapeutic Exercise Minutes  10  -MIKO      98637 - OT Therapeutic Activity Minutes  10  -MIKO      Recorded by [MIKO] Shruthi Levine, OT 12/05/18 1019      Row Name 12/05/18 0947             Therapeutic Exercise    Upper Extremity Range of Motion (Therapeutic Exercise)  shoulder flexion/extension, bilateral;shoulder abduction/adduction, bilateral;shoulder horizontal abduction/adduction, bilateral;elbow flexion/extension, bilateral scapula elevation and retraction  -MIKO      Lower Extremity  (Therapeutic Exercise)  gluteal sets  -MIKO      Exercise Type (Therapeutic Exercise)  AROM (active range of motion);resistive exercises mod resistance biceps and triceps  -MIKO      Position (Therapeutic Exercise)  seated  -MIKO      Sets/Reps (Therapeutic Exercise)  1/10  -MIKO      Expected Outcome (Therapeutic Exercise)  improve functional tolerance, self-care activity;improve performance, transfer skills  -MIKO      Comment (Therapeutic Exercise)  2 rest  -MIKO      Recorded by [MIKO] Shruthi Levine, OT 12/05/18 1019      Row Name 12/05/18 0947             Balance    Balance  dynamic sitting balance;dynamic standing balance  -MIKO      Recorded by [MIKO] Shruthi Levine, OT 12/05/18 1019      Row Name 12/05/18 0947             Dynamic Sitting Balance    Level of [place], Reaches Outside Midline (Sitting, Dynamic Balance)  supervision  -MIKO      Recorded by [MIKO] Shruthi Levine, OT 12/05/18 1019      Row Name 12/05/18 0947             Dynamic Standing Balance    Level of [place], Reaches Outside Midline (Standing, Dynamic Balance)  moderate assist, 50 to 74% patient effort  -MIKO      Recorded by [MIKO] Shruthi Levine, OT 12/05/18 1019      Row Name 12/05/18 0947             Positioning and Restraints    Pre-Treatment Position  in bed  -MIKO      Post Treatment Position  chair  -MIKO      In Chair  reclined;call light within reach;encouraged to call for assist;exit alarm on;with family/caregiver  -MIKO      Recorded by [MIKO] Shruthi Levine, OT 12/05/18 1019      Row Name 12/05/18 0947             Pain Scale: Numbers Pre/Post-Treatment    Pain Scale: Numbers, Pretreatment  8/10 pt. smiling as rates.  Pt. appears to overrate despite exp.  -MIKO      Pain Scale: Numbers, Post-Treatment  8/10  -MIKO      Pain Location - Side  Bilateral  -MIKO      Pain Location - Orientation  lower  -MIKO      Pain Location  extremity  -MIKO      Pain Intervention(s)  Ambulation/increased activity;Repositioned  -MIKO      Recorded by [MIKO] Shruthi Levine, OT  12/05/18 1019      Row Name 12/05/18 0947             Outcome Summary/Treatment Plan (OT)    Daily Summary of Progress (OT)  progress toward functional goals is good  -MIKO      Barriers to Overall Progress (OT)  chronic LE pain, PLOF  -MIKO      Plan for Continued Treatment (OT)  Cont OT POC  -MIKO      Recorded by [MIKO] Levine Shruthi Jones, OT 12/05/18 1019        User Key  (r) = Recorded By, (t) = Taken By, (c) = Cosigned By    Initials Name Effective Dates Discipline    Shruthi Mensah, OT 06/08/18 -  OT           OT Rehab Goals     Row Name 12/05/18 0947             Bed Mobility Goal 1 (OT)    Progress/Outcomes (Bed Mobility Goal 1, OT)  goal partially met met supine to sit and scooting to EOB  -MIKO         Transfer Goal 1 (OT)    Progress/Outcome (Transfer Goal 1, OT)  goal ongoing mod level  -MIKO         Dressing Goal 1 (OT)    Progress/Outcome (Dressing Goal 1, OT)  goal partially met met socks only  -MIKO         Toileting Goal 1 (OT)    Progress/Outcome (Toileting Goal 1, OT)  goal ongoing  -MIKO        User Key  (r) = Recorded By, (t) = Taken By, (c) = Cosigned By    Initials Name Provider Type Discipline    Shruthi Mensah, OT Occupational Therapist OT        Occupational Therapy Education     Title: PT OT SLP Therapies (In Progress)     Topic: Occupational Therapy (In Progress)     Point: ADL training (In Progress)     Description: Instruct learner(s) on proper safety adaptation and remediation techniques during self care or transfers.   Instruct in proper use of assistive devices.    Learning Progress Summary           Patient Acceptance, E,D, NR by MIKO at 12/5/2018  9:47 AM    Comment:  posture sitting and standing.  UE ther. exercises, transfer safety.    Acceptance, E,TB, VU by AC at 12/4/2018  1:38 PM    Comment:  benefits of activity, role of OT, d/c plan   Family Acceptance, E,D, NR by MIKO at 12/5/2018  9:47 AM    Comment:  posture sitting and standing.  UE ther. exercises, transfer safety.   Significant  Other Acceptance, E,TB, VU by  at 12/4/2018  1:38 PM    Comment:  benefits of activity, role of OT, d/c plan                   Point: Precautions (In Progress)     Description: Instruct learner(s) on prescribed precautions during self-care and functional transfers.    Learning Progress Summary           Patient Acceptance, E,D, NR by  at 12/5/2018  9:47 AM    Comment:  posture sitting and standing.  UE ther. exercises, transfer safety.   Family Acceptance, E,D, NR by MIKO at 12/5/2018  9:47 AM    Comment:  posture sitting and standing.  UE ther. exercises, transfer safety.                   Point: Body mechanics (In Progress)     Description: Instruct learner(s) on proper positioning and spine alignment during self-care, functional mobility activities and/or exercises.    Learning Progress Summary           Patient Acceptance, E,D, NR by  at 12/5/2018  9:47 AM    Comment:  posture sitting and standing.  UE ther. exercises, transfer safety.   Family Acceptance, E,D, NR by  at 12/5/2018  9:47 AM    Comment:  posture sitting and standing.  UE ther. exercises, transfer safety.                               User Key     Initials Effective Dates Name Provider Type Discipline     06/08/18 -  Shruthi Levine, OT Occupational Therapist OT     06/23/15 -  Snow Feliz OT Occupational Therapist OT                OT Recommendation and Plan  Outcome Summary/Treatment Plan (OT)  Daily Summary of Progress (OT): progress toward functional goals is good  Barriers to Overall Progress (OT): chronic LE pain, PLOF  Plan for Continued Treatment (OT): Cont OT POC  Daily Summary of Progress (OT): progress toward functional goals is good  Plan of Care Review  Plan of Care Reviewed With: patient, spouse  Plan of Care Reviewed With: patient, spouse  Outcome Summary: Pt. with improved bed mobility and  can perform all ADL task in sitting, but any portion needing to stand still significantly impacted.  Outcome Measures     Row Name  12/05/18 0947 12/04/18 1130 12/04/18 1129       How much help from another person do you currently need...    Turning from your back to your side while in flat bed without using bedrails?  --  4  -MIKO  --    Moving from lying on back to sitting on the side of a flat bed without bedrails?  --  3  -MIKO  --    Moving to and from a bed to a chair (including a wheelchair)?  --  2  -MIKO  --    Standing up from a chair using your arms (e.g., wheelchair, bedside chair)?  --  2  -MIKO  --    Climbing 3-5 steps with a railing?  --  1  -MIKO  --    To walk in hospital room?  --  2  -MIKO  --    AM-PAC 6 Clicks Score  --  14  -MIKO  --       How much help from another is currently needed...    Putting on and taking off regular lower body clothing?  3  -JBA  --  2  -AC    Bathing (including washing, rinsing, and drying)  2  -JBA  --  2  -AC    Toileting (which includes using toilet bed pan or urinal)  2  -JBA  --  2  -AC    Putting on and taking off regular upper body clothing  3  -JBA  --  3  -AC    Taking care of personal grooming (such as brushing teeth)  3  -JBA  --  3  -AC    Eating meals  3  -JBA  --  3  -AC    Score  16  -JBA  --  15  -AC       Functional Assessment    Outcome Measure Options  AM-PAC 6 Clicks Daily Activity (OT)  -JBA  AM-PAC 6 Clicks Basic Mobility (PT)  -MIKO  AM-PAC 6 Clicks Daily Activity (OT)  -      User Key  (r) = Recorded By, (t) = Taken By, (c) = Cosigned By    Initials Name Provider Type    MIKO Margo Love, PT Physical Therapist    Shruthi Youngblood, OT Occupational Therapist    AC Snow Feliz, OT Occupational Therapist           Time Calculation:   Time Calculation- OT     Row Name 12/05/18 0947             Time Calculation- OT    OT Start Time  0947  -MIKO      OT Received On  12/05/18  -MIKO      OT Goal Re-Cert Due Date  12/14/18  -MIKO         Timed Charges    49955 - OT Therapeutic Exercise Minutes  10  -MIKO      30108 - OT Therapeutic Activity Minutes  10  -MIKO      69490 - OT Self Care/Mgmt  Minutes  3  -MIKO        User Key  (r) = Recorded By, (t) = Taken By, (c) = Cosigned By    Initials Name Provider Type    Shruthi Mensah, OT Occupational Therapist           Therapy Suggested Charges     Code   Minutes Charges    49519 (CPT®) Hc Ot Neuromusc Re Education Ea 15 Min      90757 (CPT®) Hc Ot Ther Proc Ea 15 Min 10 1    56450 (CPT®) Hc Ot Therapeutic Act Ea 15 Min 10 1    40025 (CPT®) Hc Ot Manual Therapy Ea 15 Min      45579 (CPT®) Hc Ot Iontophoresis Ea 15 Min      62441 (CPT®) Hc Ot Elec Stim Ea-Per 15 Min      68327 (CPT®) Hc Ot Ultrasound Ea 15 Min      60991 (CPT®) Hc Ot Self Care/Mgmt/Train Ea 15 Min 3     Total  23 2        Therapy Charges for Today     Code Description Service Date Service Provider Modifiers Qty    38255081735 HC OT THER PROC EA 15 MIN 12/5/2018 Shruthi Levine, OT GO 1    34529498967 HC OT THERAPEUTIC ACT EA 15 MIN 12/5/2018 Shruthi Levine OT GO 1               Shruthi Levine OT  12/5/2018

## 2018-12-05 NOTE — PROGRESS NOTES
Clinical Nutrition     Nutrition Assessment  Reason for Visit:   Follow-up protocol    Patient Name: Sandip Feliz  YOB: 1931  MRN: [id number]  Date of Encounter: 12/05/18 12:12 PM  Admission date: 12/1/2018    Nutrition Assessment     Hospital Problem List    Generalized weakness    HTN (hypertension)    Paroxysmal atrial fibrillation (CMS/HCC)    Urinary retention    Prediabetes    Hyponatremia    Acute UTI (urinary tract infection)    Anemia    Pain in both lower extremities    Acute UTI      PMH: He  has a past medical history of A-fib (CMS/HCC), Arthritis, Shaffer catheter in place, GERD (gastroesophageal reflux disease), Hearing impairment, Hearing loss, Heartburn, Hypertension, Melanoma (CMS/HCC), Wears dentures, Wears dentures, Wears glasses, and Wears hearing aid.   PSxH: He  has a past surgical history that includes Stomach surgery; Appendectomy; Cataract extraction; Skin cancer excision; Colonoscopy; GREENLIGHT LASER VAPORIZATION OF PROSTATE (N/A, 10/30/2018); and UNICOMPARTMENTAL ARTHROPLASTY LEFT knee (Left, 2/6/2017).     Bilateral L5 S1 Radiculopathy    Reported/Observed/Food/Nutrition Related History:       Observe pt walking with PT, pt reports he is feeling better  '  Family reports: pt has poor appetite, is chewing/ swallowing ok    Per RN: pt very [facility]      Labs reviewed     Results from last 7 days   Lab Units  12/05/18   0712   12/01/18   1405   SODIUM mmol/L  130*   < >  126*   POTASSIUM mmol/L  3.9   < >  3.8   CHLORIDE mmol/L  101   < >  97*   CO2 mmol/L  22.0   < >  22.0   BUN mg/dL  15   < >  17   CREATININE mg/dL  0.65   < >  0.89   CALCIUM mg/dL  8.4*   < >  8.9   BILIRUBIN mg/dL   --    --   0.4   ALK PHOS U/L   --    --   107*   ALT (SGPT) U/L   --    --   17   AST (SGOT) U/L   --    --   23   GLUCOSE mg/dL  160*   < >  116*    < > = values in this interval not displayed.           Lab Results   Lab Value Date/Time    HGBA1C 6.70 (H) 12/02/2018 0721     HGBA1C 6.00 (H) 01/31/2017 1111         Medications reviewed   Pertinent: B12, thiamine, decadron, probiotic         GI: wnl    SKIN: wnl    Current Nutrition Prescription     PO: Cardiac      Intake: 50% of 2 meals  Insufficient data         Nutrition Diagnosis     Problem Inadequate oral intake   Etiology Per Clinical Status   Signs/Symptoms Po Intake 50%                   Nutrition Intervention     Interventions Goal    General: Nutrition support treatment               Establish Po     Nutrition Interventions   1.  Follow treatment progress, Menu provided, Menu adjusted, Supplement provided    Cardiac- Consistent Carb diet  Boost GC 2x/day    Monitor/ Evaluation    I&O, PO intake, Pertinent labs, Weight, Skin status, GI status      Will Continue to follow per protocol      Ashanti Yang, TAMRA  Time Spent: 30min

## 2018-12-05 NOTE — PLAN OF CARE
Problem: Patient Care Overview  Goal: Plan of Care Review  Outcome: Ongoing (interventions implemented as appropriate)   12/05/18 3049   Coping/Psychosocial   Plan of Care Reviewed With patient   Plan of Care Review   Progress improving   OTHER   Outcome Summary Pt able to increase ambulation distance to 2 x 15ft with RW with [place] x2. Pt requires significant cues for posture, weightshifting, and gait quality. Pt very [place]. Continue POC.

## 2018-12-06 VITALS
RESPIRATION RATE: 18 BRPM | OXYGEN SATURATION: 97 % | HEART RATE: 76 BPM | WEIGHT: 140 LBS | BODY MASS INDEX: 23.32 KG/M2 | SYSTOLIC BLOOD PRESSURE: 111 MMHG | HEIGHT: 65 IN | TEMPERATURE: 97.6 F | DIASTOLIC BLOOD PRESSURE: 67 MMHG

## 2018-12-06 PROCEDURE — 25010000002 DEXAMETHASONE PER 1 MG: Performed by: INTERNAL MEDICINE

## 2018-12-06 PROCEDURE — 99239 HOSP IP/OBS DSCHRG MGMT >30: CPT | Performed by: NURSE PRACTITIONER

## 2018-12-06 RX ORDER — LANOLIN ALCOHOL/MO/W.PET/CERES
100 CREAM (GRAM) TOPICAL DAILY
Qty: 30 TABLET | Refills: 0
Start: 2018-12-07 | End: 2019-01-01

## 2018-12-06 RX ORDER — GABAPENTIN 100 MG/1
100 CAPSULE ORAL EVERY 8 HOURS SCHEDULED
Qty: 9 CAPSULE | Refills: 0
Start: 2018-12-06 | End: 2019-01-07

## 2018-12-06 RX ADMIN — LISINOPRIL: 20 TABLET ORAL at 08:25

## 2018-12-06 RX ADMIN — DEXAMETHASONE SODIUM PHOSPHATE 4 MG: 4 INJECTION, SOLUTION INTRAMUSCULAR; INTRAVENOUS at 02:22

## 2018-12-06 RX ADMIN — GABAPENTIN 100 MG: 100 CAPSULE ORAL at 05:41

## 2018-12-06 RX ADMIN — CYANOCOBALAMIN TAB 1000 MCG 1000 MCG: 1000 TAB at 08:25

## 2018-12-06 RX ADMIN — Medication 250 MG: at 08:25

## 2018-12-06 RX ADMIN — HYDROCODONE BITARTRATE AND ACETAMINOPHEN 1 TABLET: 5; 325 TABLET ORAL at 00:17

## 2018-12-06 RX ADMIN — SODIUM CHLORIDE, PRESERVATIVE FREE 3 ML: 5 INJECTION INTRAVENOUS at 08:26

## 2018-12-06 RX ADMIN — HYDROCODONE BITARTRATE AND ACETAMINOPHEN 1 TABLET: 5; 325 TABLET ORAL at 08:25

## 2018-12-06 RX ADMIN — DEXAMETHASONE SODIUM PHOSPHATE 4 MG: 4 INJECTION, SOLUTION INTRAMUSCULAR; INTRAVENOUS at 08:25

## 2018-12-06 RX ADMIN — Medication 100 MG: at 08:25

## 2018-12-06 RX ADMIN — FINASTERIDE 5 MG: 5 TABLET, FILM COATED ORAL at 08:25

## 2018-12-06 NOTE — DISCHARGE SUMMARY
[facility] Medicine Services  DISCHARGE SUMMARY    Patient Name: Sandip Feliz  : 1931  MRN: [id number]    Date of Admission: 2018  Date of Discharge:  2018  Primary Care Physician: Kelly Robison MD    Consults     Date and Time Order Name Status Description    2018 1115 Inpatient Neurosurgery Consult      2018 0030 Inpatient Urology Consult      2018 2241 Inpatient Neurology Consult General Completed         Hospital Course     Presenting Problem:   Acute UTI [N39.0]    Active Hospital Problems    Diagnosis Date Noted   • **Generalized weakness [R53.1] 2018   • Acute UTI [N39.0] 2018   • Acute UTI (urinary tract infection) [N39.0] 2018   • Anemia [D64.9] 2018   • Pain in both lower extremities [M79.604, M79.605] 2018   • Hyponatremia [E87.1] 10/31/2018   • Urinary retention [R33.9] 10/30/2018   • Prediabetes [R73.03] 10/30/2018   • Paroxysmal atrial fibrillation (CMS/HCC) [I48.0] 2017   • HTN (hypertension) [I10] 2017      Resolved Hospital Problems   No resolved problems to display.          Hospital Course:  Sandip Feliz is a 87 y.o. male with a PMH of HTN, BPH, chronic pain, GERD, A fib on chronic xarelto, and recent admission in October for urinary retention s/p reduction of paraphimosis and cystoscopy with clot evacuation by Dr. Rausch.  He has been off of his xarelto per urology's recommendations due to hematuria.  The patient has his christianson removed approximately three weeks ago.  He presented to [facility] with complaints of generalized weakness and acute urinary retention on 18.  He was admitted to hospital medicine.  Work up revealed L5-S1 radiculopathy, subacute/chronic per EMG/NCS that he had on 12/3.  He had acute inability to walk and urinary retention.  Neurology started gabapentin and signed off.  MRI of L spine showed high-grade multilevel foraminal stenosis.  He was placed on IV  decadron for a few days to alleviate pain.  He was seen in consultation by neurosurgery but they currently recommend non surgical management.  The patient will follow up with Dr. Mccallum at discharge, as he has followed with him in the past.  The patient was seen by urology during this hospitalization and required a christianson for acute retention.  He was treated for a 20-30k E coli UTI.  He received a course of IV rocephin due to his symptoms.  The patient will continue with a christianson at discharge and will need to undergo bladder training at [facility] and follow up with Dr. Rausch.  His xarelto will remain on hold until Dr. Rausch gives the okay to resume this medication.  The patient is currently medically stable and will be transferred to [facility] today for further rehabilitation.          Day of Discharge     HPI:   Resting in bed, no overnight issues.  Ready to go to rehab.     Review of Systems  Gen- No fevers, chills  CV- No chest pain, palpitations  Resp- No cough, dyspnea  GI- No N/V/D, abd pain      Otherwise ROS is negative except as mentioned in the HPI.    Vital Signs:   Temp:  [97.2 °F (36.2 °C)-98.1 °F (36.7 °C)] 97.6 °F (36.4 °C)  Heart Rate:  [64-80] 76  Resp:  [18] 18  BP: (111-125)/(62-67) 111/67     Physical Exam:  Constitutional: No acute distress, awake, alert, resting in bed, no family at bedside.   HENT: NCAT, mucous membranes moist  Respiratory: Clear to auscultation bilaterally, respiratory effort normal on room air  Cardiovascular: RRR, no murmurs, rubs, or gallops, palpable pedal pulses bilaterally  Gastrointestinal: Positive bowel sounds, soft, nontender, nondistended  :  Christianson catheter intact.   Musculoskeletal: No bilateral ankle edema  Psychiatric: Appropriate affect, cooperative  Neurologic: Oriented x 3, strength symmetric in all extremities, Cranial Nerves grossly intact to confrontation, speech clear  Skin: No rashes      Pertinent  and/or Most Recent Results     Results  from last 7 days   Lab Units  12/05/18   0712  12/04/18   0648  12/03/18   0522  12/03/18   0521  12/02/18   0721  12/01/18   1405   WBC 10*3/mm3  5.91  6.85   --   8.45  8.19  7.06   HEMOGLOBIN g/dL  10.6*  10.4*   --   10.2*  10.3*  10.5*   HEMATOCRIT %  32.9*  32.6*   --   33.1*  33.6*  33.8*   PLATELETS 10*3/mm3  348  317   --   363  377  386   SODIUM mmol/L  130*  133  133   --   133  126*   POTASSIUM mmol/L  3.9  3.9  3.5   --   4.0  3.8   CHLORIDE mmol/L  101  105  102   --   101  97*   CO2 mmol/L  22.0  20.0  21.0   --   23.0  22.0   BUN mg/dL  15  8*  9   --   14  17   CREATININE mg/dL  0.65  0.57*  0.65   --   0.75  0.89   GLUCOSE mg/dL  160*  111*  115*   --   112*  116*   CALCIUM mg/dL  8.4*  8.2*  8.3*   --   8.6*  8.9     Results from last 7 days   Lab Units  12/01/18   1405   BILIRUBIN mg/dL  0.4   ALK PHOS U/L  107*   ALT (SGPT) U/L  17   AST (SGOT) U/L  23           Invalid input(s): TG, LDLCALC, LDLREALC  Results from last 7 days   Lab Units  12/02/18   0721  12/01/18   2300  12/01/18   1405   TSH mIU/mL  0.971   --    --    CORTISOL mcg/dL   --   12.50   --    HEMOGLOBIN A1C %  6.70*   --    --    BNP pg/mL   --    --   39.0     Brief Urine Lab Results  (Last result in the past 365 days)      Color   Clarity   Blood   Leuk Est   Nitrite   Protein   CREAT   Urine HCG        12/01/18 1414 Straw Cloudy 3+ Moderate (2+) Positive Trace               Microbiology Results Abnormal     Procedure Component Value - Date/Time    Urine Culture - Urine, Urine, Clean Catch [[id number]]  (Abnormal)  (Susceptibility) Collected:  12/01/18 1402    Lab Status:  Final result Specimen:  Urine, Clean Catch Updated:  12/03/18 1141     Urine Culture 20,000-30,000 CFU/mL Escherichia coli      20,000-30,000 CFU/mL Normal Urogenital Hailey    Susceptibility      Escherichia coli     QUIN     Ampicillin Resistant     Ampicillin + Sulbactam Resistant     Aztreonam Susceptible     Cefepime Susceptible     Cefotaxime Susceptible      Ceftriaxone Susceptible     Cefuroxime sodium Susceptible     Cephalothin Intermediate     Ciprofloxacin Resistant     Ertapenem Susceptible     Gentamicin Susceptible     Levofloxacin Resistant     Meropenem Susceptible     Nitrofurantoin Susceptible     Piperacillin + Tazobactam Susceptible     Tetracycline Susceptible     Tobramycin Susceptible     Trimethoprim + Sulfamethoxazole Susceptible                          Imaging Results (all)     Procedure Component Value Units Date/Time    MRI Brain Without Contrast [[id number]] Collected:  12/05/18 1308     Updated:  12/05/18 1334    Narrative:       EXAMINATION: MRI BRAIN WO CONTRAST-      INDICATION: TIA, initial screening.     TECHNIQUE:  Multiplanar multisequence MRI of the brain was performed  without contrast.     COMPARISONS:  10/10/2016.     FINDINGS:  Age-related parenchymal atrophy and nonspecific subcortical,  periventricular and deep white matter T2 prolongation; neither have  increased significantly since 2016.  There is no abnormal parenchymal  diffusion restriction. There is no mass effect or shift of the midline  structures and the basal cisterns are patent. [place] of [place] flow  voids are preserved. Minimal mucosal thickening of the left maxillary  sinus.       Impression:       1. No significant change in the appearance of the brain since 2016.  2. No evidence of acute infarction.      D:  12/05/2018  E:  12/05/2018     This report was finalized on 12/5/2018 1:32 PM by Joaquin Franklin.       MRI Lumbar Spine Without Contrast [[id number]] Collected:  12/04/18 1035     Updated:  12/04/18 1035    Narrative:       EXAMINATION: MRI LUMBAR SPINE WO CONTRAST- 12/04/2018     INDICATION: N39.0-Urinary tract infection, site not specified;  M79.604-Pain in right leg; M79.605-Pain in left leg; R26.2-Difficulty in  walking, not elsewhere classified; Z86.79-Personal history of other  diseases of the circulatory system; Z86.79-Personal history of other  diseases  of the circulatory system; Z91.81-History of falling;  R33.8-Other         TECHNIQUE: Sagittal T1-T2 STIR and axial T2-weighted images of the  lumbar spine.     COMPARISON: NONE     FINDINGS: Patient indicates lower back pain radiating to right leg,  uncomfortable standing, lying flat.     Images are significantly motion degraded throughout the study. There is  straightening of the normal lumbar lordosis. No compression deformity or  significant focal subluxation is seen. There is multilevel canal  narrowing due to disc protrusions and posterior element hypertrophy.  There appears to be only mild edema associated with degenerative disc  changes, particularly at L5-S1, and no evidence of underlying pathologic  lesion.     Axial images show canal borderline stenotic at T12-L1.     At L1-2, canal appears normal diameter. Foramina appear lower limits of  normal.     At L2-3, there is mild asymmetric canal narrowing due to facet  osteophytes and irregular disc bulge. Foramina appear normal on the  right and mildly narrowed on the left.     At L3-4, there is mild to moderate canal stenosis, with large facet  osteophyte and disc protrusion causing significant narrowing of the left  one third of bony canal, and causing thecal sac to appear deviated to  the right. Right neural foramen is at least moderately narrowed. Left  neural foramen appears obliterated by bony hypertrophic change.     At L4-5, there is moderate canal stenosis due to posterior element  hypertrophy and disc protrusion. There is again high-grade left-sided  foraminal stenosis and there appears to be only mild right-sided  foraminal stenosis.     At L5-S1, there is a central disc protrusion and osteophyte, with what  is thought to be significant narrowing of the left one half of the canal  and milder narrowing of the right one half of the canal. There appears  to be left lateral recess stenosis as well whether due to disc  protrusion or facet osteophyte.  Left neural foramen is markedly  narrowed. Right neural foramen appears practically completely effaced.       Impression:       1. Heavily motion degraded exam. No evidence of acute trauma or  significant focal subluxation.  2. High-grade multilevel foraminal stenosis, including moderate right  and marked to severe left-sided foraminal stenosis at L3-4, marked  left-sided foraminal stenosis at L4-5, marked bilateral foraminal  stenosis at L5-S1.  3. Asymmetric mild to moderate canal stenosis at L3-4, moderate canal  stenosis at L4-5 and milder L2-3 canal stenosis. Bony hypertrophic  change causes irregular canal narrowing. Please see above complete  description by disc level.     D:  12/04/2018  E:  12/04/2018       MRI Outside Films [[id number]] Resulted:  12/02/18 1434     Updated:  12/02/18 1434    Narrative:       This procedure was auto-finalized with no dictation required.    XR Chest 1 View [[id number]] Collected:  12/01/18 1631     Updated:  12/02/18 1104    Narrative:          EXAMINATION: XR CHEST 1 VW - 12/01/2018     INDICATION: Shortness of air.     COMPARISON: 10/22/2018     FINDINGS: Portable chest reveals the heart to be enlarged. Underlying  chronic changes seen in lung fields bilaterally with slight prominence  of the pulmonary vascularity. Degenerative changes seen within the  spine. No pleural effusion or pneumothorax. Chronic changes seen within  the lung bases. Slight prominence of the pulmonary vascularity.           Impression:       Enlargement of the heart with slight prominence of the  pulmonary vascularity. No evidence of acute parenchymal disease.     DICTATED:   12/01/2018  EDITED/ls :   12/01/2018      This report was finalized on 12/2/2018 11:02 AM by Dr. Fozia Franco MD.                       Results for orders placed during the hospital encounter of 12/01/18   Adult Transthoracic Echo Complete W/ Cont if Necessary Per Protocol    Narrative · Left ventricular systolic function  is normal.  · Estimated EF appears to be in the range of 56 - 60%  · Left ventricular diastolic dysfunction (grade I) consistent with   impaired relaxation.  · Trace to mild tricuspid valve regurgitation is present. Estimated right   ventricular systolic pressure from tricuspid regurgitation is normal (<35   mmHg)            Discharge Details        Discharge Medications      New Medications      Instructions Start Date   gabapentin 100 MG capsule  Commonly known as:  NEURONTIN   100 mg, Oral, Every 8 Hours Scheduled      thiamine 100 MG tablet  Commonly known as:  VITAMIN B1   100 mg, Oral, Daily         Changes to Medications      Instructions Start Date   saccharomyces boulardii 250 MG capsule  Commonly known as:  FLORASTOR  What changed:    · when to take this  · reasons to take this   250 mg, Oral, 2 Times Daily         Continue These Medications      Instructions Start Date   acetaminophen 500 MG tablet  Commonly known as:  TYLENOL   1,000 mg, Oral, Every 6 Hours PRN      amLODIPine-benazepril 5-20 MG per capsule  Commonly known as:  LOTREL 5-20   1 capsule, Oral, Daily      finasteride 5 MG tablet  Commonly known as:  PROSCAR   5 mg, Oral, Daily      HYDROcodone-acetaminophen 5-325 MG per tablet  Commonly known as:  NORCO   1 tablet, Oral, Every 6 Hours PRN      MULTIVITAMIN MEN 50+ PO   Oral, Daily      PROLIA 60 MG/ML solution syringe  Generic drug:  denosumab   60 mg, Subcutaneous, Every 6 Months      raNITIdine 300 MG tablet  Commonly known as:  ZANTAC   300 mg, Oral, As Needed      STOOL SOFTENER 100 MG capsule  Generic drug:  docusate sodium   1 capsule, Oral, 2 Times Daily      vitamin B-12 1000 MCG tablet  Commonly known as:  CYANOCOBALAMIN   1,000 mcg, Oral, Daily      Vitamin D3 2000 units tablet   Oral, Daily         Stop These Medications    cefdinir 300 MG capsule  Commonly known as:  OMNICEF     nystatin 334960 UNIT/GM ointment  Commonly known as:  MYCOSTATIN     rivaroxaban 10 MG  tablet  Commonly known as:  XARELTO     tamsulosin 0.4 MG capsule 24 hr capsule  Commonly known as:  FLOMAX     triamterene-hydrochlorothiazide 37.5-25 MG per tablet  Commonly known as:  MAXZIDE-25              Discharge Disposition:  Rehab Facility or Unit (DC - External)    Discharge Diet:  Diet Instructions     Diet: Consistent Carbohydrate, Cardiac; Thin      Discharge Diet:   Consistent Carbohydrate  Cardiac       Fluid Consistency:  Thin          Discharge Activity:   Activity Instructions     Activity as Tolerated                Code Status/Level of Support:  Code Status and Medical Interventions:   Ordered at: 12/01/18 2021     Level Of Support Discussed With:    Patient     Code Status:    CPR     Medical Interventions (Level of Support Prior to Arrest):    Full       Future Appointments   Date Time Provider Department Center   4/24/2019 10:00 AM  [facility]  [facility]   10/4/2019  9:45 AM Nino Felipe MD [facility] None       Additional Instructions for the Follow-ups that You Need to Schedule     Discharge Follow-up with PCP   As directed       Currently Documented PCP:    Kelly Robison MD    PCP Phone Number:    [phone]     Follow Up Details:  first available hospital follow up appt         Discharge Follow-up with Specified Provider: Dr. Rausch   As directed      To:  Dr. Rausch    Follow Up Details:  1-2 weeks               Time Spent on Discharge:  45 minutes    Electronically signed by PITO Ordaz, 12/06/18, 11:37 AM.

## 2018-12-06 NOTE — PROGRESS NOTES
Continued Stay Note  [facility]     Patient Name: Sandip Feliz  MRN: [id number]  Today's Date: 12/6/2018    Admit Date: 12/1/2018    Discharge Plan     Row Name 12/06/18 0926       Plan    Plan  Plan is transfer to [facility] Pulmonary unit on 12-6-18 for IP rehab.  Family to transport    Plan Comments  Plan is [facility] Pulmon[facility] unit on 12-6-18.  Family to tranpsort.      Final Discharge Disposition Code  03 - skilled nursing facility (SNF)        Discharge Codes    No documentation.             Narda Aleman RN

## 2018-12-06 NOTE — PLAN OF CARE
Problem: Patient Care Overview  Goal: Plan of Care Review  Outcome: Ongoing (interventions implemented as appropriate)   12/06/18 8271   Coping/Psychosocial   Plan of Care Reviewed With patient   Plan of Care Review   Progress no change   OTHER   Outcome Summary FC still in place. pt becomes confused at times. anticipate d/c to [facility] today     Goal: Individualization and Mutuality  Outcome: Ongoing (interventions implemented as appropriate)      Problem: Fall Risk (Adult)  Goal: Absence of Fall  Outcome: Ongoing (interventions implemented as appropriate)      Problem: Skin Injury Risk (Adult)  Goal: Skin Health and Integrity  Outcome: Ongoing (interventions implemented as appropriate)

## 2018-12-07 ENCOUNTER — EPISODE CHANGES (OUTPATIENT)
Dept: CASE MANAGEMENT | Facility: OTHER | Age: 83
End: 2018-12-07

## 2018-12-12 ENCOUNTER — EPISODE CHANGES (OUTPATIENT)
Dept: CASE MANAGEMENT | Facility: OTHER | Age: 83
End: 2018-12-12

## 2018-12-20 ENCOUNTER — PATIENT OUTREACH (OUTPATIENT)
Dept: CASE MANAGEMENT | Facility: OTHER | Age: 83
End: 2018-12-20

## 2018-12-20 ENCOUNTER — EPISODE CHANGES (OUTPATIENT)
Dept: CASE MANAGEMENT | Facility: OTHER | Age: 83
End: 2018-12-20

## 2018-12-20 NOTE — OUTREACH NOTE
Care Management Plan 12/20/2018   Lifestyle Goals Routine follow-up with doctor(s);Self monitor blood pressure   Barriers Disease education   Self Management Medication Adherence;Other (See Comment)   Self Management Receiving outpatient PT, OT at [facility]   Annual Wellness Visit:  Patient Has Completed   Care Gaps Addressed Flu Shot   Care Gaps Addressed -   Specific Disease Process Teaching Hypertension   Does patient have depression diagnosis? No   Advanced Directives: Not Interested At This Time   Advanced Directives: -   Ed Visits past 12 months: None   Hospitalizations past 12 months 1     The main concerns and/or symptoms the patient would like to address are: Talked with patient's wife. She states patient returned home from [facility] inpatient for rehab on 12/15/18. He is receiving outpatient PT and OT. Patient continues with christianson catheter due urinary retention. He has urology follow up appointment 12/27/18 for recommendations he lives with his wife and receives assistance as needed.He ambulates with walker; no difficulties with  chest pain; SOB; appetite or sleeping. She states patient is compliant with medications and medical appointments.     Education/instruction provided by Care Coordinator: Reviewed with patient's wife 24/7 Nurse Line Telephone number; [place] contact information; Advance Directives; gaps in care; My Chart and Care Advising program. Patient's wife verbalized understanding. Declines Advance Directives and My Chart information; flu vaccine completed;MWV completed. No further questions or concerns voiced at this time.      Follow Up Outreach Due: Follow up as needed.     Beverley More RN

## 2018-12-29 ENCOUNTER — HOSPITAL ENCOUNTER (EMERGENCY)
Facility: HOSPITAL | Age: 83
Discharge: HOME | End: 2018-12-29
Attending: EMERGENCY MEDICINE | Admitting: EMERGENCY MEDICINE

## 2018-12-29 VITALS
TEMPERATURE: 97.5 F | RESPIRATION RATE: 16 BRPM | SYSTOLIC BLOOD PRESSURE: 130 MMHG | DIASTOLIC BLOOD PRESSURE: 67 MMHG | WEIGHT: 140 LBS | BODY MASS INDEX: 23.32 KG/M2 | HEIGHT: 65 IN | HEART RATE: 88 BPM | OXYGEN SATURATION: 97 %

## 2018-12-29 DIAGNOSIS — T83.9XXA FOLEY CATHETER PROBLEM, INITIAL ENCOUNTER (HCC): Primary | ICD-10-CM

## 2018-12-29 PROCEDURE — 99283 EMERGENCY DEPT VISIT LOW MDM: CPT

## 2018-12-29 PROCEDURE — 51798 US URINE CAPACITY MEASURE: CPT

## 2019-01-01 ENCOUNTER — APPOINTMENT (OUTPATIENT)
Dept: LAB | Facility: HOSPITAL | Age: 84
End: 2019-01-01

## 2019-01-01 ENCOUNTER — HOSPITAL ENCOUNTER (OUTPATIENT)
Dept: CARDIOLOGY | Facility: HOSPITAL | Age: 84
Discharge: HOME | End: 2019-07-12
Admitting: PHYSICIAN ASSISTANT

## 2019-01-01 ENCOUNTER — OFFICE VISIT (OUTPATIENT)
Dept: CARDIOLOGY | Facility: CLINIC | Age: 84
End: 2019-01-01

## 2019-01-01 ENCOUNTER — TRANSCRIBE ORDERS (OUTPATIENT)
Dept: ADMINISTRATIVE | Facility: HOSPITAL | Age: 84
End: 2019-01-01

## 2019-01-01 ENCOUNTER — HOSPITAL ENCOUNTER (OUTPATIENT)
Dept: GENERAL RADIOLOGY | Facility: HOSPITAL | Age: 84
Discharge: HOME | End: 2019-08-20
Admitting: FAMILY MEDICINE

## 2019-01-01 ENCOUNTER — TELEPHONE (OUTPATIENT)
Dept: CARDIOLOGY | Facility: CLINIC | Age: 84
End: 2019-01-01

## 2019-01-01 ENCOUNTER — LAB (OUTPATIENT)
Dept: LAB | Facility: HOSPITAL | Age: 84
End: 2019-01-01

## 2019-01-01 ENCOUNTER — INFUSION (OUTPATIENT)
Dept: ONCOLOGY | Facility: HOSPITAL | Age: 84
End: 2019-01-01

## 2019-01-01 ENCOUNTER — TRANSCRIBE ORDERS (OUTPATIENT)
Dept: LAB | Facility: HOSPITAL | Age: 84
End: 2019-01-01

## 2019-01-01 VITALS — BODY MASS INDEX: 24.32 KG/M2 | WEIGHT: 146 LBS | HEIGHT: 65 IN

## 2019-01-01 VITALS
SYSTOLIC BLOOD PRESSURE: 118 MMHG | TEMPERATURE: 98.3 F | HEART RATE: 92 BPM | DIASTOLIC BLOOD PRESSURE: 67 MMHG | RESPIRATION RATE: 16 BRPM

## 2019-01-01 VITALS
BODY MASS INDEX: 24.46 KG/M2 | SYSTOLIC BLOOD PRESSURE: 108 MMHG | HEIGHT: 65 IN | WEIGHT: 146.8 LBS | HEART RATE: 69 BPM | OXYGEN SATURATION: 97 % | DIASTOLIC BLOOD PRESSURE: 60 MMHG

## 2019-01-01 VITALS
HEART RATE: 63 BPM | SYSTOLIC BLOOD PRESSURE: 135 MMHG | RESPIRATION RATE: 20 BRPM | DIASTOLIC BLOOD PRESSURE: 67 MMHG | TEMPERATURE: 98.2 F

## 2019-01-01 DIAGNOSIS — R22.1 NECK MASS: Primary | ICD-10-CM

## 2019-01-01 DIAGNOSIS — I48.0 PAROXYSMAL ATRIAL FIBRILLATION (HCC): ICD-10-CM

## 2019-01-01 DIAGNOSIS — M81.0 OSTEOPOROSIS, UNSPECIFIED OSTEOPOROSIS TYPE, UNSPECIFIED PATHOLOGICAL FRACTURE PRESENCE: Primary | ICD-10-CM

## 2019-01-01 DIAGNOSIS — R22.1 NECK MASS: ICD-10-CM

## 2019-01-01 DIAGNOSIS — R22.1 SWOLLEN NECK: ICD-10-CM

## 2019-01-01 DIAGNOSIS — M48.062 SPINAL STENOSIS, LUMBAR REGION WITH NEUROGENIC CLAUDICATION: Primary | ICD-10-CM

## 2019-01-01 DIAGNOSIS — I10 ESSENTIAL HYPERTENSION: ICD-10-CM

## 2019-01-01 DIAGNOSIS — R22.1 LUMP IN NECK: ICD-10-CM

## 2019-01-01 DIAGNOSIS — I73.9 PVD (PERIPHERAL VASCULAR DISEASE) WITH CLAUDICATION (HCC): Primary | ICD-10-CM

## 2019-01-01 DIAGNOSIS — I73.9 PVD (PERIPHERAL VASCULAR DISEASE) WITH CLAUDICATION (HCC): ICD-10-CM

## 2019-01-01 LAB
ALBUMIN SERPL-MCNC: 3.7 G/DL (ref 3.5–5.2)
ALBUMIN/GLOB SERPL: 1.3 G/DL
ALP SERPL-CCNC: 91 U/L (ref 39–117)
ALT SERPL W P-5'-P-CCNC: 11 U/L (ref 1–41)
ANION GAP SERPL CALCULATED.3IONS-SCNC: 12 MMOL/L (ref 5–15)
AST SERPL-CCNC: 20 U/L (ref 1–40)
BASOPHILS # BLD AUTO: 0.08 10*3/MM3 (ref 0–0.2)
BASOPHILS NFR BLD AUTO: 1 % (ref 0–1.5)
BH CV LOWER ARTERIAL LEFT DORSALIS PEDIS SYS MAX: 255 MMHG
BH CV LOWER ARTERIAL LEFT HIGH THIGH SYS MAX: 196 MMHG
BH CV LOWER ARTERIAL LEFT LOW THIGH SYS MAX: 192 MMHG
BH CV LOWER ARTERIAL LEFT POPLITEAL SYS MAX: 255 MMHG
BH CV LOWER ARTERIAL LEFT POST TIBIAL SYS MAX: 255 MMHG
BH CV LOWER ARTERIAL LEFT TBI RATIO: 0.8
BH CV LOWER ARTERIAL RIGHT DORSALIS PEDIS SYS MAX: 255 MMHG
BH CV LOWER ARTERIAL RIGHT HIGH THIGH SYS MAX: 190 MMHG
BH CV LOWER ARTERIAL RIGHT LOW THIGH SYS MAX: 192 MMHG
BH CV LOWER ARTERIAL RIGHT POPLITEAL SYS MAX: 255 MMHG
BH CV LOWER ARTERIAL RIGHT POST TIBIAL SYS MAX: 255 MMHG
BH CV LOWER ARTERIAL RIGHT TBI RATIO: 0.92
BILIRUB SERPL-MCNC: 0.2 MG/DL (ref 0.2–1.2)
BUN BLD-MCNC: 16 MG/DL (ref 8–23)
BUN/CREAT SERPL: 20.3 (ref 7–25)
CALCIUM SPEC-SCNC: 9 MG/DL (ref 8.6–10.5)
CHLORIDE SERPL-SCNC: 106 MMOL/L (ref 98–107)
CO2 SERPL-SCNC: 21 MMOL/L (ref 22–29)
CREAT BLD-MCNC: 0.79 MG/DL (ref 0.76–1.27)
DEPRECATED RDW RBC AUTO: 51.1 FL (ref 37–54)
EOSINOPHIL # BLD AUTO: 0.53 10*3/MM3 (ref 0–0.4)
EOSINOPHIL NFR BLD AUTO: 6.4 % (ref 0.3–6.2)
ERYTHROCYTE [DISTWIDTH] IN BLOOD BY AUTOMATED COUNT: 15.4 % (ref 12.3–15.4)
GFR SERPL CREATININE-BSD FRML MDRD: 93 ML/MIN/1.73
GLOBULIN UR ELPH-MCNC: 2.8 GM/DL
GLUCOSE BLD-MCNC: 87 MG/DL (ref 65–99)
HCT VFR BLD AUTO: 45.6 % (ref 37.5–51)
HGB BLD-MCNC: 13.6 G/DL (ref 13–17.7)
IMM GRANULOCYTES # BLD AUTO: 0.02 10*3/MM3 (ref 0–0.05)
IMM GRANULOCYTES NFR BLD AUTO: 0.2 % (ref 0–0.5)
LYMPHOCYTES # BLD AUTO: 1.56 10*3/MM3 (ref 0.7–3.1)
LYMPHOCYTES NFR BLD AUTO: 19 % (ref 19.6–45.3)
MCH RBC QN AUTO: 27.3 PG (ref 26.6–33)
MCHC RBC AUTO-ENTMCNC: 29.8 G/DL (ref 31.5–35.7)
MCV RBC AUTO: 91.6 FL (ref 79–97)
MONOCYTES # BLD AUTO: 0.78 10*3/MM3 (ref 0.1–0.9)
MONOCYTES NFR BLD AUTO: 9.5 % (ref 5–12)
NEUTROPHILS # BLD AUTO: 5.26 10*3/MM3 (ref 1.7–7)
NEUTROPHILS NFR BLD AUTO: 63.9 % (ref 42.7–76)
NRBC BLD AUTO-RTO: 0 /100 WBC (ref 0–0.2)
PLATELET # BLD AUTO: 240 10*3/MM3 (ref 140–450)
PMV BLD AUTO: 12 FL (ref 6–12)
POTASSIUM BLD-SCNC: 4.1 MMOL/L (ref 3.5–5.2)
PROT SERPL-MCNC: 6.5 G/DL (ref 6–8.5)
RBC # BLD AUTO: 4.98 10*6/MM3 (ref 4.14–5.8)
SODIUM BLD-SCNC: 139 MMOL/L (ref 136–145)
UPPER ARTERIAL LEFT ARM BRACHIAL SYS MAX: 143 MMHG
UPPER ARTERIAL RIGHT ARM BRACHIAL SYS MAX: 141 MMHG
WBC NRBC COR # BLD: 8.23 10*3/MM3 (ref 3.4–10.8)

## 2019-01-01 PROCEDURE — 25010000002 DENOSUMAB 60 MG/ML SOLUTION PREFILLED SYRINGE: Performed by: FAMILY MEDICINE

## 2019-01-01 PROCEDURE — 36415 COLL VENOUS BLD VENIPUNCTURE: CPT | Performed by: FAMILY MEDICINE

## 2019-01-01 PROCEDURE — 99214 OFFICE O/P EST MOD 30 MIN: CPT | Performed by: INTERNAL MEDICINE

## 2019-01-01 PROCEDURE — 80053 COMPREHEN METABOLIC PANEL: CPT

## 2019-01-01 PROCEDURE — 96372 THER/PROPH/DIAG INJ SC/IM: CPT

## 2019-01-01 PROCEDURE — 36415 COLL VENOUS BLD VENIPUNCTURE: CPT

## 2019-01-01 PROCEDURE — 85025 COMPLETE CBC W/AUTO DIFF WBC: CPT

## 2019-01-01 PROCEDURE — 25010000002 DENOSUMAB 60 MG/ML SOLUTION: Performed by: FAMILY MEDICINE

## 2019-01-01 PROCEDURE — 93923 UPR/LXTR ART STDY 3+ LVLS: CPT | Performed by: INTERNAL MEDICINE

## 2019-01-01 PROCEDURE — 93923 UPR/LXTR ART STDY 3+ LVLS: CPT

## 2019-01-01 PROCEDURE — 71046 X-RAY EXAM CHEST 2 VIEWS: CPT

## 2019-01-01 RX ORDER — TORSEMIDE 5 MG/1
5 TABLET ORAL DAILY
COMMUNITY

## 2019-01-01 RX ORDER — GABAPENTIN 400 MG/1
400 CAPSULE ORAL 3 TIMES DAILY
Qty: 90 CAPSULE | Refills: 0 | OUTPATIENT
Start: 2019-01-01

## 2019-01-01 RX ORDER — ASPIRIN 81 MG/1
81 TABLET, CHEWABLE ORAL DAILY
COMMUNITY

## 2019-01-01 RX ADMIN — DENOSUMAB 60 MG: 60 INJECTION SUBCUTANEOUS at 09:54

## 2019-01-01 RX ADMIN — DENOSUMAB 60 MG: 60 INJECTION SUBCUTANEOUS at 10:00

## 2019-01-07 ENCOUNTER — OFFICE VISIT (OUTPATIENT)
Dept: NEUROSURGERY | Facility: CLINIC | Age: 84
End: 2019-01-07

## 2019-01-07 VITALS
HEIGHT: 65 IN | DIASTOLIC BLOOD PRESSURE: 60 MMHG | SYSTOLIC BLOOD PRESSURE: 90 MMHG | TEMPERATURE: 97.5 F | BODY MASS INDEX: 23.32 KG/M2 | WEIGHT: 140 LBS

## 2019-01-07 DIAGNOSIS — C43.72 MALIGNANT MELANOMA OF LEFT LOWER EXTREMITY INCLUDING HIP (HCC): ICD-10-CM

## 2019-01-07 DIAGNOSIS — M48.062 SPINAL STENOSIS, LUMBAR REGION, WITH NEUROGENIC CLAUDICATION: Primary | ICD-10-CM

## 2019-01-07 DIAGNOSIS — I48.91 ATRIAL FIBRILLATION, UNSPECIFIED TYPE (HCC): ICD-10-CM

## 2019-01-07 PROCEDURE — 99214 OFFICE O/P EST MOD 30 MIN: CPT | Performed by: PHYSICIAN ASSISTANT

## 2019-01-07 RX ORDER — GABAPENTIN 400 MG/1
CAPSULE ORAL
COMMUNITY
Start: 2018-12-15 | End: 2019-01-07

## 2019-01-07 RX ORDER — TAMSULOSIN HYDROCHLORIDE 0.4 MG/1
CAPSULE ORAL
COMMUNITY
Start: 2019-01-04

## 2019-01-07 RX ORDER — LISINOPRIL 20 MG/1
TABLET ORAL
COMMUNITY
Start: 2018-12-15

## 2019-01-07 RX ORDER — GABAPENTIN 400 MG/1
400 CAPSULE ORAL 3 TIMES DAILY
Qty: 90 CAPSULE | Refills: 0 | OUTPATIENT
Start: 2019-01-07 | End: 2019-01-01 | Stop reason: SDUPTHER

## 2019-01-07 NOTE — PROGRESS NOTES
"Patient: Sandip Feliz  : 1931    Primary Care Provider: Kelly Robison MD      Chief Complaint: Low-back pain and right lower extremity pain    History of Present Illness:        Patient is an 87-year-old male who has a very brief past medical history.  Patient has been very healthy up until January of this year.  In January patient started noticing he is having more pain with his low back and legs whenever he would walk for distance and it has decreases activity for approximately 11 months.  About 11 months ago he was living completely independent and ambulating freely with assistance.  Patient is an avid traveler with his wife who is at bedside to help with history.  Patient has been treated with Dr. Mccallum with lumbar epidural steroid injections.  Was last seen by Dr. Mccallum in 2018.     In early October patient was having an acute exacerbation of low back pain when he started retaining urine.  Patient underwent a procedure with Dr. Dunlap in October and an indwelling catheter was left in place for 8 weeks and then he was trialed off of the Shaffer patient was unable to empty his bladder.  Patient is been seen recently by Dr. Rausch and patient continues to need in and out catheterization from his wife.  Patient will undergo a circumcision the  of this month to done by Dr. Rausch.     Patient's pain has improved since hospitalization but continues to give him issues rhinorrhea is ambulating.  Patient's pain is minimal when he is seated but has difficulty ambulating is even with a walker.  Patient is set the last few nights upright in a chair slumped forward. Patient is comfortable and can \"live with the pain\" when he is sitting.      Since admission patient's symptoms have worsened to where he is much more comfortable but still having issues with ambulating.  The MRI was impossible at first due to the patient's pain when lying flat, but he got an MRI of the lumbar spine that " shows quite a bit of motion artifact but does reveal some lumbar stenosis and lateral recess stenosis at L5-S1 and L4 5 on the left.     I had a discussion again with the patient and wife about continuing care with Dr. Mccallum.  They are in agreement with this.  I explained to them that we would need to repeat a workup that they have likely already done which would increased cost for the patient.  They're like to see if Dr. Mccallum has anything to offer.     Review of Systems   Constitutional: Negative for activity change, appetite change, chills, diaphoresis, fatigue, fever and unexpected weight change.   HENT: Positive for hearing loss. Negative for congestion, dental problem, drooling, ear discharge, ear pain, facial swelling, mouth sores, nosebleeds, postnasal drip, rhinorrhea, sinus pressure, sneezing, sore throat, tinnitus, trouble swallowing and voice change.    Eyes: Negative for photophobia, pain, discharge, redness, itching and visual disturbance.   Respiratory: Negative for apnea, cough, choking, chest tightness, shortness of breath, wheezing and stridor.    Cardiovascular: Positive for leg swelling. Negative for chest pain and palpitations.   Gastrointestinal: Negative for abdominal distention, abdominal pain, anal bleeding, blood in stool, constipation, diarrhea, nausea, rectal pain and vomiting.   Genitourinary: Positive for difficulty urinating and flank pain.   Musculoskeletal: Positive for arthralgias and back pain. Negative for gait problem, joint swelling, myalgias, neck pain and neck stiffness.   Skin: Negative for color change, pallor, rash and wound.   Allergic/Immunologic: Negative for environmental allergies, food allergies and immunocompromised state.   Neurological: Negative for dizziness, tremors, seizures, syncope, facial asymmetry, speech difficulty, weakness, light-headedness, numbness and headaches.   Hematological: Negative for adenopathy. Does not bruise/bleed easily.  "  Psychiatric/Behavioral: Negative for agitation, behavioral problems, confusion, decreased concentration, dysphoric mood, self-injury, sleep disturbance and suicidal ideas. The patient is not nervous/anxious and is not hyperactive.        Past Medical History:     Past Medical History:   Diagnosis Date   • A-fib (CMS/HCC)    • Arthritis    • Shaffer catheter in place    • GERD (gastroesophageal reflux disease)    • Hearing impairment    • Hearing loss     both ears hearing aides   • Heartburn    • Hypertension    • Melanoma (CMS/HCC)    • Wears dentures    • Wears dentures     partial bottom full at top   • Wears glasses    • Wears hearing aid        Family History:     Family History   Problem Relation Age of Onset   • Cancer Mother    • Stroke Father    • Prostate cancer Brother    • Prostate cancer Brother    • Prostate cancer Brother        Social History:    reports that he quit smoking about 49 years ago. His smoking use included pipe. he has never used smokeless tobacco. He reports that he drinks alcohol. He reports that he does not use drugs.   SMOKING STATUS: Nonsmoker    Surgical History:     Past Surgical History:   Procedure Laterality Date   • APPENDECTOMY     • CATARACT EXTRACTION     • COLONOSCOPY      2 years ago   • CYSTOSCOPY TRANSURETHRAL RESECTION OF PROSTATE N/A 10/30/2018    Procedure: GREENLIGHT LASER VAPORIZATION OF PROSTATE;  Surgeon: Sen Rausch Jr., MD;  Location: [facility];  Service: Urology   • KNEE ARTHROPLASTY UNICOMPARTMENTAL Left 2/6/2017    Procedure: UNICOMPARTMENTAL ARTHROPLASTY LEFT knee;  Surgeon: Richard Bennett MD;  Location: [facility];  Service:    • SKIN CANCER EXCISION      MELANOMA   • STOMACH SURGERY         Allergies:   Patient has no known allergies.    Physical Exam:    Vital Signs:BP 90/60   Temp 97.5 °F (36.4 °C)   Ht 165.1 cm (65\")   Wt 63.5 kg (140 lb)   BMI 23.30 kg/m²    BMI: Body mass index is 23.3 kg/m².     GENERAL:   The patient is in no acute " distress, and is able to answer all questions appropriately.  Neck: Supple without lymphadenopathy  Musculoskeletal:     strength is 4 out of 5 bilaterally.   Shoulder abduction is 4 out of 5.    Dorsiflexion is 3-4/5 right and 5 out of 5 on left.  Plantarflexion is strong and equal bilaterally  Hip Flexion 5/5 bilaterally.    The patient’s gait not tested secondary to pain  Neurologic:   The patient is alert and oriented by 3.     Pupils are equal and reactive to light.    Visual fields are full.    Extraocular movements are intact without nystagmus.    There is no evidence of central motor drift. No facial droop.  No difficulty with rapid alternating movements.    Sensation is equal bilaterally with no deficit.      Reflexes:  1+ @ biceps, triceps, brachioradialis, as well as the patellar and Achilles tendon bilaterally.  No Felicia's, clonus, or long track signs noted     Results Review:              I reviewed the patient's new imaging results and agree with the interpretation.  Discussed with Dr. Reyna.  MRI of the lumbar spine shows severe left-sided L5-S1 foraminal stenosis.  Patient also has a facet cyst with lateral recess stenosis on left L4 5.      EMG nerve conduction studies show subacute/chronic L5-S1 radiculopathy bilaterally.     Peroneal nerve is normal which is all in considering patient has right-sided foot drop and the majority of the patient's lumbar MRI has pathology on the left.      Diagnosis:   Lumbar spinal stenosis with chronic radiculopathy.     Treatment Options:   We're going to get a return appointment with Dr. Mccallum for the patient.  I have discussed this with my schedulers to him in this week.    Has been a pleasure providing neurosurgical care.    We will see the patient back on an as-needed basis.    Ephraim Aguero PA-C      No diagnosis found.

## 2019-01-07 NOTE — TELEPHONE ENCOUNTER
Gabapentin 400mg 1 tab po TID #90 NR called in to pt's [facility] pharmacy per Ephraim. (seen in office today 1/7/19)    Please sign.

## 2019-01-10 ENCOUNTER — PATIENT OUTREACH (OUTPATIENT)
Dept: CASE MANAGEMENT | Facility: OTHER | Age: 84
End: 2019-01-10

## 2019-01-10 NOTE — OUTREACH NOTE
Talked with patient's wife. She states patient continues with pain to right leg and food. She states this pain is constant. He ambulates with walker and may use a transport chair. He attends [facility] outpatient PT and OT. Patient no longer has christianson catheter; voids and being catheterized for urinary retention by his wife. She states urine clear; no blood noted and that the amount obtained from catheterizing varies. She states patient is compliant with medications; medical appointments . He has no difficulty with chest pain; SOB;or  appetite. He has episodes of difficulty sleeping. Patient has appointment for epidural injection on Wednesday 1/16/19. Patient's blood pressure is being monitored daily with values of 126/60 and systolic no higher than 130. Reviewed with wife24/7 Nurse Line Telephone number;  medications; benefits of measuring and logging urine output return from catheterization; education of HTN; PCP contact regarding pain. She verbalized understanding. No further questions or concerns voiced at this time.

## 2019-02-08 ENCOUNTER — PATIENT OUTREACH (OUTPATIENT)
Dept: CASE MANAGEMENT | Facility: OTHER | Age: 84
End: 2019-02-08

## 2019-02-08 NOTE — OUTREACH NOTE
"Talked with patient's wife. She states patient is doing \"pretty good\". She states pain to right leg and foot has improved. He is not longer attending outpatient therapy at [facility] and continues with home exercise program. He is ambulating walker in the house and uses transport chair when going for longer distances such as physician appointments. She reports patient has no difficulty with chest pain; SOB; or appetite. He has episodes of waking up at night to void; able to walk independently with steady gait. She catheterizes patient in the morning and evening for urinary retention. The residual urine is decreasing. Patient is be compliant with medications; medical appointments and monitoring of blood pressure. She states patient received epidural injection which decreased pain. recommendations. Reviewed with patient's wife Advance Directives (completed): My Chart (declined); gaps in care(flu vaccine completed); MWV (completed).  Patient has met care plan goals, all care gaps have been addressed, and patient has assistance from spouse regarding health self-management and adequate support system.   "

## 2019-02-12 ENCOUNTER — EPISODE CHANGES (OUTPATIENT)
Dept: CASE MANAGEMENT | Facility: OTHER | Age: 84
End: 2019-02-12

## 2019-03-13 NOTE — TELEPHONE ENCOUNTER
Provider:  Axel  Caller: [email or web address]  Time of call:   1:17  Phone #:  Fax [phone]  Surgery:  no  Surgery Date:    Last visit:   01/07/19  Next visit: None    MIMI:     01/23/2019 Oxycodone/Acetaminophen 325MG/5MG 08/24/1931 20 5 SONNY MICHAEL JR  [facility] [id number] [facility] [address] 1  02/06/2019 Gabapentin 600MG 08/24/1931 5 7 CARLOZ BALTAZAR [facility]  [facility]  02/25/2019 Gabapentin 0 08/24/1931 3 7 CARLOZ BALTAZAR [facility]  [facility] 1    Reason for call:     Patient needs refill on Gabapentin.

## 2019-07-12 NOTE — PROGRESS NOTES
Sandip Feliz  8/24/1931  [phone]    VISIT DATE: 7/12/2019     PCP: Kelly Robison MD  [address] [place] [address]    IDENTIFICATION: A 87 y.o. male retired from [employer].       PROBLEM LIST:    1. Atrial fibrillation:    1. CHADS-VASc = 3.  2. Currently rate controlled.  912/2/18 echo: EF 56 to 60%, grade 1 diastolic dysfunction, trace to mild TR, RVSP normal  2. Hypertension.    3. T2DM  1. 12/2/2018 A1c 6.7  4. Hyponatremia:  5. Present on admission with no previous history.    6. GERD.    7. Osteoporosis.    8. Urinary obstruction indwelling catheter 9 /18 followed per Dr. Rausch    Chief Complaint   Patient presents with   • Atrial Fibrillation       Allergies  No Known Allergies    Current Medications    Current Outpatient Medications:   •  acetaminophen (TYLENOL) 500 MG tablet, Take 1,000 mg by mouth Every 6 (Six) Hours As Needed for Mild Pain ., Disp: , Rfl:   •  Cholecalciferol (VITAMIN D3) 2000 units tablet, Take  by mouth Daily., Disp: , Rfl:   •  denosumab (PROLIA) 60 MG/ML solution syringe, Inject 60 mg under the skin every 6 (six) months., Disp: , Rfl:   •  finasteride (PROSCAR) 5 MG tablet, Take 1 tablet by mouth Daily., Disp: 30 tablet, Rfl: 0  •  gabapentin (NEURONTIN) 400 MG capsule, Take 1 capsule by mouth 3 (Three) Times a Day. (Patient taking differently: Take 600 mg by mouth 3 (Three) Times a Day.), Disp: 90 capsule, Rfl: 0  •  HYDROcodone-acetaminophen (NORCO) 5-325 MG per tablet, Take 1 tablet by mouth Every 6 (Six) Hours As Needed, Disp: 30 tablet, Rfl: 0  •  lisinopril (PRINIVIL,ZESTRIL) 20 MG tablet, , Disp: , Rfl:   •  Multiple Vitamins-Minerals (MULTIVITAMIN MEN 50+ PO), Take  by mouth Daily., Disp: , Rfl:   •  raNITIdine (ZANTAC) 300 MG tablet, Take 300 mg by mouth As Needed for Indigestion or Heartburn., Disp: , Rfl:   •  rivaroxaban (XARELTO) 20 MG tablet, Take 10 mg by mouth Daily., Disp: , Rfl:   •  saccharomyces boulardii (FLORASTOR) 250 MG  "capsule, Take 1 capsule by mouth 2 (Two) Times a Day. (Patient taking differently: Take 250 mg by mouth As Needed.), Disp: 30 capsule, Rfl: 0  •  tamsulosin (FLOMAX) 0.4 MG capsule 24 hr capsule, , Disp: , Rfl:   •  torsemide (DEMADEX) 5 MG tablet, Take 5 mg by mouth Daily., Disp: , Rfl:   •  vitamin B-12 (CYANOCOBALAMIN) 1000 MCG tablet, Take 1,000 mcg by mouth Daily., Disp: , Rfl:     History of Present Illness     Pt here for early follow up d/t RLE pain and edema.     Pt denies any chest pain, dyspnea, dyspnea on exertion, orthopnea, PND, palpitations, lower extremity edema.      He is back on Xarelto 10mg daily following episodes on hematuria. He has coughed up blood, especially at night. It improves when he holds his xarelto, which he has been doing every week on average.     Bp is acceptable.      ROS:  All systems have been reviewed and are negative with the exception of those mentioned in the HPI.    OBJECTIVE:  Vitals:    07/12/19 1012   BP: 108/60   BP Location: Right arm   Patient Position: Sitting   Pulse: 69   SpO2: 97%   Weight: 66.6 kg (146 lb 12.8 oz)   Height: 165.1 cm (65\")     Physical Exam   Constitutional: He appears well-developed and well-nourished.   Neck: Normal range of motion. Neck supple. No hepatojugular reflux and no JVD present. Carotid bruit is not present. No tracheal deviation present. No thyromegaly present.   Cardiovascular: Normal rate, regular rhythm, S1 normal, S2 normal, intact distal pulses and normal pulses. PMI is not displaced. Exam reveals no gallop, no distant heart sounds, no friction rub, no midsystolic click and no opening snap.   No murmur heard.  Pulses:       Radial pulses are 2+ on the right side, and 2+ on the left side.        Dorsalis pedis pulses are 2+ on the right side, and 2+ on the left side.        Posterior tibial pulses are 2+ on the right side, and 2+ on the left side.   Pulmonary/Chest: Effort normal and breath sounds normal. He has no wheezes. He " has no rales.   Abdominal: Soft. Bowel sounds are normal. He exhibits no mass. There is no tenderness. There is no guarding.       Diagnostic Data:  Procedures      ASSESSMENT:   Diagnosis Plan   1. PVD (peripheral vascular disease) with claudication (CMS/HCC)  US Arterial Doppler Lower Extremity Right   2. Paroxysmal atrial fibrillation (CMS/HCC)     3. Essential hypertension         PLAN:  Will have pt get arterial u/s done today.    Paf pt on and off xarelto d/t continued bleeding. We will take him off Xarelto and start ASA 81mg    htn controlled.  And follow blood pressures in the weekend and contact us may need to decrease and a hypertensive      Ricki, Kelly Charles MD, thank you for referring Mr. Feliz for evaluation.  I have forwarded my electronically generated recommendations to you for review.  Please do not hesitate to call with any questions.    Scribed for Nino Felipe MD by Jimena Parr PA-C. 7/12/2019  10:29 AM   Nino Felipe MD, FACC

## (undated) DEVICE — PK CYSTO-TUR BASIC 10

## (undated) DEVICE — ANTIBACTERIAL UNDYED BRAIDED (POLYGLACTIN 910), SYNTHETIC ABSORBABLE SUTURE: Brand: COATED VICRYL

## (undated) DEVICE — MEDI-VAC YANKAUER SUCTION HANDLE W/BULBOUS TIP: Brand: CARDINAL HEALTH

## (undated) DEVICE — MEDI-VAC NON-CONDUCTIVE SUCTION TUBING: Brand: CARDINAL HEALTH

## (undated) DEVICE — GLV SURG SENSICARE MICRO PF LF 7.5 STRL

## (undated) DEVICE — CANN NASL CO2 DIVIDED A/

## (undated) DEVICE — AIRWY 90MM NO9

## (undated) DEVICE — GLV SURG TRIUMPH CLASSIC PF LTX 8 STRL

## (undated) DEVICE — 3M™ TEGADERM™ CHG DRESSING 25/CARTON 4 CARTONS/CASE 1658: Brand: TEGADERM™

## (undated) DEVICE — NERVE BLOCK SUPPORT KIT/BLUE: Brand: MEDLINE INDUSTRIES, INC.

## (undated) DEVICE — STRAP STIRUP WO/RNG 19X3.5IN DISP

## (undated) DEVICE — BNDG ELAS ELITE V/CLOSE 6IN 5YD LF STRL

## (undated) DEVICE — SENSR O2 OXIMAX FNGR A/ 18IN NONSTR

## (undated) DEVICE — PK KN TOTL 10

## (undated) DEVICE — ST NERV BLCK CONT CONTIPLEX ECHO CLSD 18G 4IN

## (undated) DEVICE — INTENDED USE FOR SURGICAL MARKING ON INTACT SKIN, ALSO PROVIDES A PERMANENT METHOD OF IDENTIFYING OBJECTS IN THE OPERATING ROOM: Brand: WRITESITE® REGULAR TIP SKIN MARKER

## (undated) DEVICE — SYS SKIN CLS DERMABOND PRINEO W/60CM MESH TP

## (undated) DEVICE — DRSNG PAD ABD 8X10IN STRL

## (undated) DEVICE — COVER,LIGHT HANDLE,FLX,1/PK: Brand: MEDLINE INDUSTRIES, INC.

## (undated) DEVICE — DRP CASSET 10 RAY LG 24X40

## (undated) DEVICE — UNDERCAST PADDING: Brand: DEROYAL

## (undated) DEVICE — SPNG GZ WOVN 4X4IN 12PLY 10/BX STRL

## (undated) DEVICE — CANNULA,OXY,ADULT,SUPERSOFT,W/7'TUB,UC: Brand: MEDLINE

## (undated) DEVICE — DRSNG GZ CURAD XEROFORM NONADHR OVERWRAP 5X9IN

## (undated) DEVICE — APPL CHLORAPREP W/TINT 26ML BLU

## (undated) DEVICE — RECIPROCATING BLADE HEAVY DUTY LONG, OFFSET  (77.6 X 0.77 X 11.2MM)

## (undated) DEVICE — GLV SURG SIGNATURE TOUCH PF LTX 8 STRL BX/50

## (undated) DEVICE — ADAPT ST INFUS ADMIN SYR 70IN

## (undated) DEVICE — NDL HYPO ECLPS SFTY 22G 1 1/2IN

## (undated) DEVICE — SYR LUERLOK 30CC

## (undated) DEVICE — RECIPROCATING BLADE, DOUBLE SIDED, OFFSET  (70.0 X 0.8 X 12.5MM)

## (undated) DEVICE — 2000CC GUARDIAN II: Brand: GUARDIAN

## (undated) DEVICE — DRAPE,REIN 53X77,STERILE: Brand: MEDLINE

## (undated) DEVICE — DEFOGGER!" ANTI FOG KIT: Brand: DEROYAL

## (undated) DEVICE — SIGMA LCS HIGH PERFORMANCE STERILE THREADED HEADED PINS: Brand: SIGMA LCS HIGH PERFORMANCE

## (undated) DEVICE — SOL LR 1000ML

## (undated) DEVICE — 3 BONE CEMENT MIXER: Brand: MIXEVAC

## (undated) DEVICE — SYR LUERLOK 50ML

## (undated) DEVICE — SKIN AFFIX SURG ADHESIVE 72/CS 0.55ML: Brand: MEDLINE

## (undated) DEVICE — SIGMA LCS HIGH PERFORMANCE INSTRUMENTS STERILE THREADED PINS: Brand: SIGMA LCS HIGH PERFORMANCE